# Patient Record
Sex: FEMALE | Race: WHITE | NOT HISPANIC OR LATINO | Employment: OTHER | URBAN - METROPOLITAN AREA
[De-identification: names, ages, dates, MRNs, and addresses within clinical notes are randomized per-mention and may not be internally consistent; named-entity substitution may affect disease eponyms.]

---

## 2017-05-30 ENCOUNTER — HOSPITAL ENCOUNTER (OUTPATIENT)
Dept: MAMMOGRAPHY | Facility: CLINIC | Age: 67
Discharge: HOME | End: 2017-05-30
Attending: FAMILY MEDICINE

## 2017-05-30 DIAGNOSIS — Z12.31 VISIT FOR SCREENING MAMMOGRAM: ICD-10-CM

## 2017-08-18 ENCOUNTER — COMMUNICATION - HEALTHEAST (OUTPATIENT)
Dept: FAMILY MEDICINE | Facility: CLINIC | Age: 67
End: 2017-08-18

## 2017-08-18 DIAGNOSIS — I10 ESSENTIAL HYPERTENSION, BENIGN: ICD-10-CM

## 2017-08-23 ENCOUNTER — OFFICE VISIT - HEALTHEAST (OUTPATIENT)
Dept: FAMILY MEDICINE | Facility: CLINIC | Age: 67
End: 2017-08-23

## 2017-08-23 ENCOUNTER — AMBULATORY - HEALTHEAST (OUTPATIENT)
Dept: NURSING | Facility: CLINIC | Age: 67
End: 2017-08-23

## 2017-08-23 DIAGNOSIS — J45.909 ASTHMA: ICD-10-CM

## 2017-08-23 DIAGNOSIS — E66.9 OBESITY: ICD-10-CM

## 2017-08-23 DIAGNOSIS — Z13.820 SCREENING FOR OSTEOPOROSIS: ICD-10-CM

## 2017-08-23 DIAGNOSIS — Z00.00 ANNUAL PHYSICAL EXAM: ICD-10-CM

## 2017-08-23 DIAGNOSIS — E78.5 HYPERLIPIDEMIA, UNSPECIFIED HYPERLIPIDEMIA TYPE: ICD-10-CM

## 2017-08-23 LAB
CHOLEST SERPL-MCNC: 187 MG/DL
FASTING STATUS PATIENT QL REPORTED: YES
HBA1C MFR BLD: 6.1 % (ref 3.5–6)
HDLC SERPL-MCNC: 51 MG/DL
LDLC SERPL CALC-MCNC: 103 MG/DL
TRIGL SERPL-MCNC: 164 MG/DL

## 2017-08-23 ASSESSMENT — MIFFLIN-ST. JEOR: SCORE: 1628.79

## 2017-08-25 ENCOUNTER — RECORDS - HEALTHEAST (OUTPATIENT)
Dept: BONE DENSITY | Facility: CLINIC | Age: 67
End: 2017-08-25

## 2017-08-25 ENCOUNTER — RECORDS - HEALTHEAST (OUTPATIENT)
Dept: ADMINISTRATIVE | Facility: OTHER | Age: 67
End: 2017-08-25

## 2017-08-25 DIAGNOSIS — Z13.820 ENCOUNTER FOR SCREENING FOR OSTEOPOROSIS: ICD-10-CM

## 2017-10-05 ENCOUNTER — COMMUNICATION - HEALTHEAST (OUTPATIENT)
Dept: FAMILY MEDICINE | Facility: CLINIC | Age: 67
End: 2017-10-05

## 2017-10-09 ENCOUNTER — COMMUNICATION - HEALTHEAST (OUTPATIENT)
Dept: FAMILY MEDICINE | Facility: CLINIC | Age: 67
End: 2017-10-09

## 2017-10-11 ENCOUNTER — OFFICE VISIT - HEALTHEAST (OUTPATIENT)
Dept: FAMILY MEDICINE | Facility: CLINIC | Age: 67
End: 2017-10-11

## 2017-10-11 DIAGNOSIS — I49.9 IRREGULAR HEART BEAT: ICD-10-CM

## 2017-10-11 DIAGNOSIS — E66.9 OBESITY: ICD-10-CM

## 2017-10-11 LAB
ATRIAL RATE - MUSE: 76 BPM
DIASTOLIC BLOOD PRESSURE - MUSE: NORMAL MMHG
INTERPRETATION ECG - MUSE: NORMAL
P AXIS - MUSE: 28 DEGREES
PR INTERVAL - MUSE: 188 MS
QRS DURATION - MUSE: 76 MS
QT - MUSE: 408 MS
QTC - MUSE: 459 MS
R AXIS - MUSE: 7 DEGREES
SYSTOLIC BLOOD PRESSURE - MUSE: NORMAL MMHG
T AXIS - MUSE: 54 DEGREES
VENTRICULAR RATE- MUSE: 76 BPM

## 2017-10-11 ASSESSMENT — MIFFLIN-ST. JEOR: SCORE: 1633.32

## 2018-01-28 ENCOUNTER — COMMUNICATION - HEALTHEAST (OUTPATIENT)
Dept: FAMILY MEDICINE | Facility: CLINIC | Age: 68
End: 2018-01-28

## 2018-01-28 DIAGNOSIS — E78.5 HYPERLIPIDEMIA, UNSPECIFIED HYPERLIPIDEMIA TYPE: ICD-10-CM

## 2018-07-11 ENCOUNTER — COMMUNICATION - HEALTHEAST (OUTPATIENT)
Dept: FAMILY MEDICINE | Facility: CLINIC | Age: 68
End: 2018-07-11

## 2018-07-11 DIAGNOSIS — I10 ESSENTIAL HYPERTENSION, BENIGN: ICD-10-CM

## 2018-08-24 ENCOUNTER — OFFICE VISIT - HEALTHEAST (OUTPATIENT)
Dept: FAMILY MEDICINE | Facility: CLINIC | Age: 68
End: 2018-08-24

## 2018-08-24 DIAGNOSIS — E78.5 HYPERLIPIDEMIA, UNSPECIFIED HYPERLIPIDEMIA TYPE: ICD-10-CM

## 2018-08-24 DIAGNOSIS — I10 ESSENTIAL HYPERTENSION, BENIGN: ICD-10-CM

## 2018-08-24 DIAGNOSIS — Z23 NEED FOR VACCINATION: ICD-10-CM

## 2018-08-24 DIAGNOSIS — J45.909 ASTHMA: ICD-10-CM

## 2018-08-24 DIAGNOSIS — Z00.00 MEDICARE ANNUAL WELLNESS VISIT, SUBSEQUENT: ICD-10-CM

## 2018-08-24 LAB
ALBUMIN SERPL-MCNC: 4.2 G/DL (ref 3.5–5)
ALP SERPL-CCNC: 58 U/L (ref 45–120)
ALT SERPL W P-5'-P-CCNC: 34 U/L (ref 0–45)
ANION GAP SERPL CALCULATED.3IONS-SCNC: 9 MMOL/L (ref 5–18)
AST SERPL W P-5'-P-CCNC: 28 U/L (ref 0–40)
BILIRUB SERPL-MCNC: 0.7 MG/DL (ref 0–1)
BUN SERPL-MCNC: 14 MG/DL (ref 8–22)
CALCIUM SERPL-MCNC: 9.9 MG/DL (ref 8.5–10.5)
CHLORIDE BLD-SCNC: 104 MMOL/L (ref 98–107)
CHOLEST SERPL-MCNC: 177 MG/DL
CO2 SERPL-SCNC: 29 MMOL/L (ref 22–31)
CREAT SERPL-MCNC: 0.75 MG/DL (ref 0.6–1.1)
FASTING STATUS PATIENT QL REPORTED: YES
GFR SERPL CREATININE-BSD FRML MDRD: >60 ML/MIN/1.73M2
GLUCOSE BLD-MCNC: 111 MG/DL (ref 70–125)
HDLC SERPL-MCNC: 48 MG/DL
LDLC SERPL CALC-MCNC: 89 MG/DL
POTASSIUM BLD-SCNC: 4.3 MMOL/L (ref 3.5–5)
PROT SERPL-MCNC: 6.4 G/DL (ref 6–8)
SODIUM SERPL-SCNC: 142 MMOL/L (ref 136–145)
TRIGL SERPL-MCNC: 199 MG/DL

## 2018-08-24 ASSESSMENT — MIFFLIN-ST. JEOR: SCORE: 1673.58

## 2019-05-28 ENCOUNTER — OFFICE VISIT - HEALTHEAST (OUTPATIENT)
Dept: FAMILY MEDICINE | Facility: CLINIC | Age: 69
End: 2019-05-28

## 2019-05-28 DIAGNOSIS — R31.9 HEMATURIA, UNSPECIFIED TYPE: ICD-10-CM

## 2019-05-28 LAB
ALBUMIN UR-MCNC: NEGATIVE MG/DL
APPEARANCE UR: CLEAR
BILIRUB UR QL STRIP: NEGATIVE
COLOR UR AUTO: YELLOW
GLUCOSE UR STRIP-MCNC: NEGATIVE MG/DL
HGB UR QL STRIP: NEGATIVE
KETONES UR STRIP-MCNC: NEGATIVE MG/DL
LEUKOCYTE ESTERASE UR QL STRIP: NEGATIVE
NITRATE UR QL: NEGATIVE
PH UR STRIP: 6.5 [PH] (ref 5–8)
SP GR UR STRIP: 1.02 (ref 1–1.03)
UROBILINOGEN UR STRIP-ACNC: NORMAL

## 2019-05-28 ASSESSMENT — MIFFLIN-ST. JEOR: SCORE: 1666.77

## 2019-06-17 ENCOUNTER — RECORDS - HEALTHEAST (OUTPATIENT)
Dept: ADMINISTRATIVE | Facility: OTHER | Age: 69
End: 2019-06-17

## 2019-06-25 ENCOUNTER — HOSPITAL ENCOUNTER (OUTPATIENT)
Dept: CT IMAGING | Facility: CLINIC | Age: 69
Discharge: HOME | End: 2019-06-25
Attending: UROLOGY

## 2019-06-25 DIAGNOSIS — R31.0 GROSS HEMATURIA: ICD-10-CM

## 2019-06-25 LAB
CREAT BLD-MCNC: 0.9 MG/DL (ref 0.6–1.1)
GFR SERPL CREATININE-BSD FRML MDRD: >60 ML/MIN/1.73M2

## 2019-07-17 ENCOUNTER — HOSPITAL ENCOUNTER (OUTPATIENT)
Dept: MAMMOGRAPHY | Facility: CLINIC | Age: 69
Discharge: HOME | End: 2019-07-17
Attending: FAMILY MEDICINE

## 2019-07-17 DIAGNOSIS — Z12.31 SCREENING MAMMOGRAM, ENCOUNTER FOR: ICD-10-CM

## 2019-08-19 ENCOUNTER — COMMUNICATION - HEALTHEAST (OUTPATIENT)
Dept: FAMILY MEDICINE | Facility: CLINIC | Age: 69
End: 2019-08-19

## 2019-08-19 DIAGNOSIS — E78.5 HYPERLIPIDEMIA, UNSPECIFIED HYPERLIPIDEMIA TYPE: ICD-10-CM

## 2019-08-27 ENCOUNTER — OFFICE VISIT - HEALTHEAST (OUTPATIENT)
Dept: FAMILY MEDICINE | Facility: CLINIC | Age: 69
End: 2019-08-27

## 2019-08-27 DIAGNOSIS — J45.909 ASTHMA: ICD-10-CM

## 2019-08-27 DIAGNOSIS — R73.01 IMPAIRED FASTING GLUCOSE: ICD-10-CM

## 2019-08-27 DIAGNOSIS — J45.30 MILD PERSISTENT ASTHMA WITHOUT COMPLICATION: ICD-10-CM

## 2019-08-27 DIAGNOSIS — I10 ESSENTIAL HYPERTENSION, BENIGN: ICD-10-CM

## 2019-08-27 DIAGNOSIS — E78.2 MIXED HYPERLIPIDEMIA: ICD-10-CM

## 2019-08-27 DIAGNOSIS — Z00.00 ROUTINE GENERAL MEDICAL EXAMINATION AT A HEALTH CARE FACILITY: ICD-10-CM

## 2019-08-27 DIAGNOSIS — Z11.59 NEED FOR HEPATITIS C SCREENING TEST: ICD-10-CM

## 2019-08-27 LAB
ALBUMIN SERPL-MCNC: 4.1 G/DL (ref 3.5–5)
ALP SERPL-CCNC: 57 U/L (ref 45–120)
ALT SERPL W P-5'-P-CCNC: 33 U/L (ref 0–45)
ANION GAP SERPL CALCULATED.3IONS-SCNC: 10 MMOL/L (ref 5–18)
AST SERPL W P-5'-P-CCNC: 30 U/L (ref 0–40)
BILIRUB SERPL-MCNC: 0.7 MG/DL (ref 0–1)
BUN SERPL-MCNC: 16 MG/DL (ref 8–22)
CALCIUM SERPL-MCNC: 10 MG/DL (ref 8.5–10.5)
CHLORIDE BLD-SCNC: 103 MMOL/L (ref 98–107)
CHOLEST SERPL-MCNC: 180 MG/DL
CO2 SERPL-SCNC: 29 MMOL/L (ref 22–31)
CREAT SERPL-MCNC: 0.81 MG/DL (ref 0.6–1.1)
FASTING STATUS PATIENT QL REPORTED: YES
GFR SERPL CREATININE-BSD FRML MDRD: >60 ML/MIN/1.73M2
GLUCOSE BLD-MCNC: 129 MG/DL (ref 70–125)
HDLC SERPL-MCNC: 53 MG/DL
LDLC SERPL CALC-MCNC: 95 MG/DL
POTASSIUM BLD-SCNC: 4.7 MMOL/L (ref 3.5–5)
PROT SERPL-MCNC: 6.4 G/DL (ref 6–8)
SODIUM SERPL-SCNC: 142 MMOL/L (ref 136–145)
TRIGL SERPL-MCNC: 161 MG/DL

## 2019-08-27 ASSESSMENT — MIFFLIN-ST. JEOR: SCORE: 1699.38

## 2019-08-28 LAB — HCV AB SERPL QL IA: NEGATIVE

## 2019-09-02 ENCOUNTER — COMMUNICATION - HEALTHEAST (OUTPATIENT)
Dept: FAMILY MEDICINE | Facility: CLINIC | Age: 69
End: 2019-09-02

## 2019-09-02 DIAGNOSIS — I10 ESSENTIAL HYPERTENSION, BENIGN: ICD-10-CM

## 2020-02-24 ENCOUNTER — OFFICE VISIT - HEALTHEAST (OUTPATIENT)
Dept: FAMILY MEDICINE | Facility: CLINIC | Age: 70
End: 2020-02-24

## 2020-02-24 DIAGNOSIS — E78.2 MIXED HYPERLIPIDEMIA: ICD-10-CM

## 2020-02-24 DIAGNOSIS — J45.31 MILD PERSISTENT ASTHMA WITH ACUTE EXACERBATION: ICD-10-CM

## 2020-02-24 DIAGNOSIS — I10 ESSENTIAL HYPERTENSION, BENIGN: ICD-10-CM

## 2020-02-24 DIAGNOSIS — R05.9 COUGH: ICD-10-CM

## 2020-02-24 ASSESSMENT — MIFFLIN-ST. JEOR: SCORE: 1669.61

## 2020-02-25 ENCOUNTER — COMMUNICATION - HEALTHEAST (OUTPATIENT)
Dept: FAMILY MEDICINE | Facility: CLINIC | Age: 70
End: 2020-02-25

## 2020-03-08 ENCOUNTER — COMMUNICATION - HEALTHEAST (OUTPATIENT)
Dept: FAMILY MEDICINE | Facility: CLINIC | Age: 70
End: 2020-03-08

## 2020-03-08 DIAGNOSIS — J45.31 MILD PERSISTENT ASTHMA WITH ACUTE EXACERBATION: ICD-10-CM

## 2020-05-05 ENCOUNTER — COMMUNICATION - HEALTHEAST (OUTPATIENT)
Dept: FAMILY MEDICINE | Facility: CLINIC | Age: 70
End: 2020-05-05

## 2020-05-05 DIAGNOSIS — E78.5 HYPERLIPIDEMIA, UNSPECIFIED HYPERLIPIDEMIA TYPE: ICD-10-CM

## 2020-05-05 DIAGNOSIS — I10 ESSENTIAL HYPERTENSION, BENIGN: ICD-10-CM

## 2020-05-11 ENCOUNTER — COMMUNICATION - HEALTHEAST (OUTPATIENT)
Dept: FAMILY MEDICINE | Facility: CLINIC | Age: 70
End: 2020-05-11

## 2020-05-11 DIAGNOSIS — I10 ESSENTIAL HYPERTENSION, BENIGN: ICD-10-CM

## 2020-05-11 DIAGNOSIS — E78.5 HYPERLIPIDEMIA, UNSPECIFIED HYPERLIPIDEMIA TYPE: ICD-10-CM

## 2020-07-28 ENCOUNTER — HOSPITAL ENCOUNTER (OUTPATIENT)
Dept: MAMMOGRAPHY | Facility: CLINIC | Age: 70
Discharge: HOME | End: 2020-07-28
Attending: FAMILY MEDICINE

## 2020-07-28 DIAGNOSIS — Z12.31 VISIT FOR SCREENING MAMMOGRAM: ICD-10-CM

## 2020-09-28 ENCOUNTER — OFFICE VISIT - HEALTHEAST (OUTPATIENT)
Dept: FAMILY MEDICINE | Facility: CLINIC | Age: 70
End: 2020-09-28

## 2020-09-28 DIAGNOSIS — J45.31 MILD PERSISTENT ASTHMA WITH ACUTE EXACERBATION: ICD-10-CM

## 2020-09-28 DIAGNOSIS — J30.9 ALLERGIC RHINITIS, UNSPECIFIED SEASONALITY, UNSPECIFIED TRIGGER: ICD-10-CM

## 2020-09-28 DIAGNOSIS — Z00.00 MEDICARE ANNUAL WELLNESS VISIT, SUBSEQUENT: ICD-10-CM

## 2020-09-28 DIAGNOSIS — I10 ESSENTIAL HYPERTENSION, BENIGN: ICD-10-CM

## 2020-09-28 DIAGNOSIS — R23.2 FLUSHING: ICD-10-CM

## 2020-09-28 DIAGNOSIS — E78.2 MIXED HYPERLIPIDEMIA: ICD-10-CM

## 2020-09-28 DIAGNOSIS — R73.01 IMPAIRED FASTING GLUCOSE: ICD-10-CM

## 2020-09-28 LAB
ALBUMIN SERPL-MCNC: 4.2 G/DL (ref 3.5–5)
ALP SERPL-CCNC: 61 U/L (ref 45–120)
ALT SERPL W P-5'-P-CCNC: 31 U/L (ref 0–45)
ANION GAP SERPL CALCULATED.3IONS-SCNC: 8 MMOL/L (ref 5–18)
AST SERPL W P-5'-P-CCNC: 28 U/L (ref 0–40)
BILIRUB SERPL-MCNC: 0.7 MG/DL (ref 0–1)
BUN SERPL-MCNC: 11 MG/DL (ref 8–22)
CALCIUM SERPL-MCNC: 10 MG/DL (ref 8.5–10.5)
CHLORIDE BLD-SCNC: 103 MMOL/L (ref 98–107)
CHOLEST SERPL-MCNC: 186 MG/DL
CO2 SERPL-SCNC: 31 MMOL/L (ref 22–31)
CREAT SERPL-MCNC: 0.85 MG/DL (ref 0.6–1.1)
ERYTHROCYTE [DISTWIDTH] IN BLOOD BY AUTOMATED COUNT: 11.7 % (ref 11–14.5)
FASTING STATUS PATIENT QL REPORTED: YES
GFR SERPL CREATININE-BSD FRML MDRD: >60 ML/MIN/1.73M2
GLUCOSE BLD-MCNC: 115 MG/DL (ref 70–125)
HBA1C MFR BLD: 5.7 %
HCT VFR BLD AUTO: 42.7 % (ref 35–47)
HDLC SERPL-MCNC: 56 MG/DL
HGB BLD-MCNC: 14.4 G/DL (ref 12–16)
LDLC SERPL CALC-MCNC: 96 MG/DL
MCH RBC QN AUTO: 30.2 PG (ref 27–34)
MCHC RBC AUTO-ENTMCNC: 33.8 G/DL (ref 32–36)
MCV RBC AUTO: 89 FL (ref 80–100)
PLATELET # BLD AUTO: 239 THOU/UL (ref 140–440)
PMV BLD AUTO: 7.2 FL (ref 7–10)
POTASSIUM BLD-SCNC: 4.5 MMOL/L (ref 3.5–5)
PROT SERPL-MCNC: 6.6 G/DL (ref 6–8)
RBC # BLD AUTO: 4.79 MILL/UL (ref 3.8–5.4)
SODIUM SERPL-SCNC: 142 MMOL/L (ref 136–145)
TRIGL SERPL-MCNC: 168 MG/DL
TSH SERPL DL<=0.005 MIU/L-ACNC: 1.01 UIU/ML (ref 0.3–5)
WBC: 4.9 THOU/UL (ref 4–11)

## 2020-09-28 ASSESSMENT — ANXIETY QUESTIONNAIRES
2. NOT BEING ABLE TO STOP OR CONTROL WORRYING: NOT AT ALL
1. FEELING NERVOUS, ANXIOUS, OR ON EDGE: NOT AT ALL

## 2020-09-28 ASSESSMENT — MIFFLIN-ST. JEOR: SCORE: 1683.5

## 2020-10-02 ENCOUNTER — COMMUNICATION - HEALTHEAST (OUTPATIENT)
Dept: FAMILY MEDICINE | Facility: CLINIC | Age: 70
End: 2020-10-02

## 2020-10-02 DIAGNOSIS — J45.909 ASTHMA: ICD-10-CM

## 2020-10-06 RX ORDER — ALBUTEROL SULFATE 90 UG/1
2 AEROSOL, METERED RESPIRATORY (INHALATION) EVERY 6 HOURS PRN
Qty: 18 G | Refills: 5 | Status: SHIPPED | OUTPATIENT
Start: 2020-10-06 | End: 2021-09-19

## 2021-02-03 ENCOUNTER — COMMUNICATION - HEALTHEAST (OUTPATIENT)
Dept: FAMILY MEDICINE | Facility: CLINIC | Age: 71
End: 2021-02-03

## 2021-02-03 DIAGNOSIS — E78.5 HYPERLIPIDEMIA, UNSPECIFIED HYPERLIPIDEMIA TYPE: ICD-10-CM

## 2021-02-03 DIAGNOSIS — I10 ESSENTIAL HYPERTENSION, BENIGN: ICD-10-CM

## 2021-02-03 RX ORDER — SIMVASTATIN 40 MG
TABLET ORAL
Qty: 90 TABLET | Refills: 2 | Status: SHIPPED | OUTPATIENT
Start: 2021-02-03 | End: 2021-11-02

## 2021-02-03 RX ORDER — LISINOPRIL AND HYDROCHLOROTHIAZIDE 20; 25 MG/1; MG/1
TABLET ORAL
Qty: 90 TABLET | Refills: 2 | Status: SHIPPED | OUTPATIENT
Start: 2021-02-03 | End: 2021-11-02

## 2021-02-09 ENCOUNTER — COMMUNICATION - HEALTHEAST (OUTPATIENT)
Dept: FAMILY MEDICINE | Facility: CLINIC | Age: 71
End: 2021-02-09

## 2021-05-25 ENCOUNTER — RECORDS - HEALTHEAST (OUTPATIENT)
Dept: ADMINISTRATIVE | Facility: CLINIC | Age: 71
End: 2021-05-25

## 2021-05-27 ENCOUNTER — RECORDS - HEALTHEAST (OUTPATIENT)
Dept: ADMINISTRATIVE | Facility: CLINIC | Age: 71
End: 2021-05-27

## 2021-05-28 ENCOUNTER — RECORDS - HEALTHEAST (OUTPATIENT)
Dept: ADMINISTRATIVE | Facility: CLINIC | Age: 71
End: 2021-05-28

## 2021-05-28 ASSESSMENT — ASTHMA QUESTIONNAIRES: ACT_TOTALSCORE: 25

## 2021-05-29 ENCOUNTER — RECORDS - HEALTHEAST (OUTPATIENT)
Dept: ADMINISTRATIVE | Facility: CLINIC | Age: 71
End: 2021-05-29

## 2021-05-29 NOTE — PROGRESS NOTES
1. Hematuria, unspecified type  Urinalysis Macroscopic    Ambulatory referral to Urology     Recent Results (from the past 24 hour(s))   Urinalysis Macroscopic    Collection Time: 05/28/19  2:18 PM   Result Value Ref Range    Color, UA Yellow Colorless, Yellow, Straw, Light Yellow    Clarity, UA Clear Clear    Glucose, UA Negative Negative    Bilirubin, UA Negative Negative    Ketones, UA Negative Negative    Specific Gravity, UA 1.020 1.005 - 1.030    Blood, UA Negative Negative    pH, UA 6.5 5.0 - 8.0    Protein, UA Negative Negative mg/dL    Urobilinogen, UA 0.2 E.U./dL 0.2 E.U./dL, 1.0 E.U./dL    Nitrite, UA Negative Negative    Leukocytes, UA Negative Negative         ASSESSMENT/PLAN:     The following high BMI interventions were performed this visit: encouragement to exercise and weight monitoring    1. Hematuria, unspecified type    - Urinalysis Macroscopic  - Ambulatory referral to Urology      There are no Patient Instructions on file for this visit.  There are no discontinued medications.  Return if symptoms worsen or fail to improve.          Stacey Lozano NP          SUBJECTIVE:  Imelda El is a 68 y.o. female resents for evaluation of her hematuria.  Onset: 11 days.  Patient denies any increased urinary frequency urgency, no burning or discomfort today.  She has noted that there has been blood in her urine intermittently on the days when she is more active such as when she takes walks or goes hiking.  She was on a vacation recently in [facility] and they did do quite a bit of walking every day, she noticed on the days that they were more active, she did have a moderate amount of bleeding, on the days when she was not walking long distances, the eating would subside.  She denies fever, chills, nausea, vomiting, diarrhea or low back pain today.  She typically takes showers not baths, she wipes front to back, she is sexually active.  No history of kidney stone formation, she is up-to-date  with colon cancer screening.  She is postmenopausal and uses KY jelly daily for vaginal dryness, most specifically on the external genitalia.  She denies any abnormal vaginal bleeding, she did have a hysterectomy, cervix and uterus have both been removed, she does have her ovaries in place.  She denies pain anywhere today. Discussed options such as sending her to urology for further evaluation.  If urology specialty is unable to find any cause for hematuria, will have her return to the clinic and vaginal Pap will be completed.  She does donate blood on a regular basis, she states her hemoglobin levels have been within normal range last several months.  She is due to donate again tomorrow and will have her hemoglobin and hematocrit levels checked then.  Chief Complaint   Patient presents with     Hematuria     x 1 week - noticied that she has blood in the urine after she does alot of physical activity         Patient Active Problem List   Diagnosis     Menopause Has Occurred     Hyperlipidemia     Benign Essential Hypertension     Allergic Rhinitis     Asthma     Obesity     Impaired Fasting Glucose     Tubular adenoma of colon       Current Outpatient Medications   Medication Sig Dispense Refill     albuterol (PROAIR HFA;PROVENTIL HFA;VENTOLIN HFA) 90 mcg/actuation inhaler Inhale 2 puffs every 6 (six) hours as needed for wheezing. 18 g 5     b complex vitamins tablet Take 1 tablet by mouth daily.       calcium-vitamin D3-vitamin K (VIACTIV) 500-500-40 mg-unit-mcg Chew per chewable tablet Chew 2 tablets 2 (two) times a day.       fexofenadine (ALLEGRA) 180 MG tablet Take 180 mg by mouth daily.       lisinopril-hydrochlorothiazide (PRINZIDE,ZESTORETIC) 20-25 mg per tablet Take 1 tablet by mouth daily. 90 tablet 3     magnesium oxide 500 mg Tab Take by mouth.       mometasone (ASMANEX TWISTHALER) 220 mcg (60 doses) inhaler INHALE 1 PUFF TWICE DAILY. 1 each 11     simvastatin (ZOCOR) 40 MG tablet Take 1 tablet (40mg) by  oral route once daily at bedtime 90 tablet 3     VIT C/E/ZN/COPPR/LUTEIN/ZEAXAN (PRESERVISION AREDS 2 ORAL) Take 2 tablets by mouth.       No current facility-administered medications for this visit.        Social History     Tobacco Use   Smoking Status Never Smoker   Smokeless Tobacco Never Used       REVIEW OF SYSTEMS: Denies abdominal pain, changes in bowel habits, diarrhea, constipation, nausea, vomiting, rectal bleeding, melena, new/unusual/ uncooked foods, no sick contacts or recent travel.   Denies dysuria, frequency, urgency, fevers, chills, back pain, nausea, vomiting or abdominal pain.        TOBACCO USE:  Social History     Tobacco Use   Smoking Status Never Smoker   Smokeless Tobacco Never Used     Social History     Socioeconomic History     Marital status:      Spouse name: Rob     Number of children: 2     Years of education: 16     Highest education level: Not on file   Occupational History     Occupation: retired   Social Needs     Financial resource strain: Not on file     Food insecurity:     Worry: Not on file     Inability: Not on file     Transportation needs:     Medical: Not on file     Non-medical: Not on file   Tobacco Use     Smoking status: Never Smoker     Smokeless tobacco: Never Used   Substance and Sexual Activity     Alcohol use: Yes     Comment: 0-1     Drug use: No     Sexual activity: Yes     Partners: Male   Lifestyle     Physical activity:     Days per week: Not on file     Minutes per session: Not on file     Stress: Not on file   Relationships     Social connections:     Talks on phone: Not on file     Gets together: Not on file     Attends Taoism service: Not on file     Active member of club or organization: Not on file     Attends meetings of clubs or organizations: Not on file     Relationship status: Not on file     Intimate partner violence:     Fear of current or ex partner: Not on file     Emotionally abused: Not on file     Physically abused: Not on file      Forced sexual activity: Not on file   Other Topics Concern     Not on file   Social History Narrative     Not on file         OBJECTIVE:            Vitals:    05/28/19 1414   BP: 142/60   Pulse: (!) 107   Resp: 16   Temp: 97.4  F (36.3  C)   SpO2: 97%     Weight: (!) 250 lb (113.4 kg)    Wt Readings from Last 3 Encounters:   05/28/19 (!) 250 lb (113.4 kg)   08/24/18 (!) 248 lb (112.5 kg)   10/11/17 (!) 240 lb (108.9 kg)     Body mass index is 40.66 kg/m .        Physical Exam:  GENERAL APPEARANCE: A&A, NAD, well hydrated, well nourished  CV: RRR, no M/G/R   LUNGS: CTAB, normal respiratory effort  ABDOMEN: S&NT, no masses, no organomegaly, BS present x4, no CVA tenderness  SKIN:  Normal skin turgor, no lesions/rashes, warm and dry

## 2021-05-30 ENCOUNTER — RECORDS - HEALTHEAST (OUTPATIENT)
Dept: ADMINISTRATIVE | Facility: CLINIC | Age: 71
End: 2021-05-30

## 2021-05-31 ENCOUNTER — RECORDS - HEALTHEAST (OUTPATIENT)
Dept: ADMINISTRATIVE | Facility: CLINIC | Age: 71
End: 2021-05-31

## 2021-05-31 VITALS — BODY MASS INDEX: 37.51 KG/M2 | HEIGHT: 67 IN | WEIGHT: 239 LBS

## 2021-05-31 VITALS — HEIGHT: 67 IN | WEIGHT: 240 LBS | BODY MASS INDEX: 37.67 KG/M2

## 2021-05-31 NOTE — TELEPHONE ENCOUNTER
Refill Approved    Rx renewed per Medication Renewal Policy. Medication was last renewed on 8/24/18, last OV 8/27/19.    Nga Perez, [facility] Triage/Med Refill 9/2/2019     Requested Prescriptions   Pending Prescriptions Disp Refills     lisinopril-hydrochlorothiazide (PRINZIDE,ZESTORETIC) 20-25 mg per tablet 90 tablet 3     Sig: Take 1 tablet by mouth daily.       Diuretics/Combination Diuretics Refill Protocol  Passed - 9/2/2019  9:34 AM        Passed - Visit with PCP or prescribing provider visit in past 12 months     Last office visit with prescriber/PCP: Visit date not found OR same dept: 5/28/2019 Stacey Lozano NP OR same specialty: 5/28/2019 Stacey Lozano NP  Last physical: 8/27/2019 Last MTM visit: Visit date not found   Next visit within 3 mo: Visit date not found  Next physical within 3 mo: Visit date not found  Prescriber OR PCP: Michell Anguiano MD  Last diagnosis associated with med order: 1. Benign Essential Hypertension  - lisinopril-hydrochlorothiazide (PRINZIDE,ZESTORETIC) 20-25 mg per tablet; Take 1 tablet by mouth daily.  Dispense: 90 tablet; Refill: 3    If protocol passes may refill for 12 months if within 3 months of last provider visit (or a total of 15 months).             Passed - Serum Potassium in past 12 months      Lab Results   Component Value Date    Potassium 4.7 08/27/2019             Passed - Serum Sodium in past 12 months      Lab Results   Component Value Date    Sodium 142 08/27/2019             Passed - Blood pressure on file in past 12 months     BP Readings from Last 1 Encounters:   08/27/19 132/74             Passed - Serum Creatinine in past 12 months      Creatinine   Date Value Ref Range Status   08/27/2019 0.81 0.60 - 1.10 mg/dL Final

## 2021-05-31 NOTE — TELEPHONE ENCOUNTER
Refill Approved    Rx renewed per Medication Renewal Policy. Medication was last renewed on 8/24/18    [place], [facility] Connection Triage/Med Refill 8/19/2019     Requested Prescriptions   Pending Prescriptions Disp Refills     simvastatin (ZOCOR) 40 MG tablet [Pharmacy Med Name: Simvastatin Oral Tablet 40 MG] 90 tablet 2     Sig: TAKE ONE TABLET BY MOUTH AT BEDTIME       Statins Refill Protocol (Hmg CoA Reductase Inhibitors) Passed - 8/19/2019  9:09 AM        Passed - PCP or prescribing provider visit in past 12 months      Last office visit with prescriber/PCP: Visit date not found OR same dept: 5/28/2019 Stacey Lozano NP OR same specialty: 5/28/2019 Stacey Lozano NP  Last physical: 8/24/2018 Last MTM visit: Visit date not found   Next visit within 3 mo: Visit date not found  Next physical within 3 mo: Visit date not found  Prescriber OR PCP: Michell Anguiano MD  Last diagnosis associated with med order: 1. Hyperlipidemia, unspecified hyperlipidemia type  - simvastatin (ZOCOR) 40 MG tablet [Pharmacy Med Name: Simvastatin Oral Tablet 40 MG]; TAKE ONE TABLET BY MOUTH AT BEDTIME   Dispense: 90 tablet; Refill: 2    If protocol passes may refill for 12 months if within 3 months of last provider visit (or a total of 15 months).

## 2021-05-31 NOTE — PROGRESS NOTES
Assessment and Plan:     1. Routine general medical examination at a health care facility    2. Benign Essential Hypertension      She will continue the lisinopril/hctz and labs were drawn. We reviewed dietary recommendations, including low salt and high fiber diet, and  recommendations for regular exercise/activity.    - Comprehensive Metabolic Panel    3. Mixed hyperlipidemia     She will continue the simvastatin as directed. We discussed limiting saturated and trans fats in her diet and using more of the good fats such as olive oil, canola oil, flax seed and peanut oil, etc.   - Lipid [place] FASTING  - Comprehensive Metabolic Panel    4. Mild persistent asthma without complication      Medications were renewed as noted. We reviewed indications for re-evaluation.  - albuterol (PROAIR HFA;PROVENTIL HFA;VENTOLIN HFA) 90 mcg/actuation inhaler; Inhale 2 puffs every 6 (six) hours as needed for wheezing.  Dispense: 18 g; Refill: 5  - mometasone (ASMANEX TWISTHALER) 220 mcg (60 doses) inhaler; INHALE 1 PUFF TWICE DAILY.  Dispense: 1 each; Refill: 11    5. Need for hepatitis C screening test  - Hepatitis C Antibody (Anti-HCV)    6. Impaired fasting glucose  - Glucose; Future  - Glycosylated Hemoglobin A1c; Future        The patient's current medical problems were reviewed.    I have had an Advance Directives discussion with the patient.  The following high BMI interventions were performed this visit: encouragement to exercise and lifestyle education regarding diet  The following health maintenance schedule was reviewed with the patient and provided in printed form in the after visit summary:   Health Maintenance   Topic Date Due     HEPATITIS C SCREENING  1950     ZOSTER VACCINES (2 of 3) 08/14/2012     INFLUENZA VACCINE RULE BASED (1) 08/01/2019     ASTHMA CONTROL TEST  08/24/2019     FALL RISK ASSESSMENT  08/24/2019     DXA SCAN  08/25/2019     MEDICARE ANNUAL WELLNESS VISIT  08/24/2019     MAMMOGRAM   07/17/2021     COLONOSCOPY  07/22/2021     ADVANCE CARE PLANNING  08/24/2023     TD 18+ HE  08/24/2028     PNEUMOCOCCAL POLYSACCHARIDE VACCINE AGE 65 AND OVER  Completed     PNEUMOCOCCAL CONJUGATE VACCINE FOR ADULTS (PCV13 OR PREVNAR)  Completed        Subjective:   Chief Complaint: Imelda El is an 68 y.o. female here for an Annual Wellness visit.   HPI:   Fasting today? Yes  Hyperlipidemia & Hypertension      Patient is also here for follow-up of hypertension and dyslipidemia. A repeat fasting lipid profile was done. Compliance with treatment has been good. Patient denies muscle pain associated with her medications. Current symptoms: none. Patient denies chest pain, dyspnea, exertional chest pressure/discomfort, lower extremity edema, near-syncope and palpitations. The patient exercises intermittently. Weight trend: fluctuating a bit.      Previous history of cardiac disease includes: none.      Review of Systems:   Asthma symptoms stable - used albuterol once last week  Takes allegra daily as well  Balanced diet. Weight up a bit.   Working on living will  Retired for a couple years.    Please see above.  The rest of the review of systems are negative for all systems.    Patient Care Team:  Michlel Anguiano MD as PCP - General     Patient Active Problem List   Diagnosis     Menopause Has Occurred     Hyperlipidemia     Benign Essential Hypertension     Allergic Rhinitis     Asthma     Obesity     Impaired Fasting Glucose     Tubular adenoma of colon     Past Medical History:   Diagnosis Date     Allergic rhinitis      Asthma      Hyperlipemia      Hypertension      Irregular heart beat      Obese       Past Surgical History:   Procedure Laterality Date     HYSTERECTOMY       OOPHORECTOMY Left      IA DILATION/CURETTAGE,DIAGNOSTIC      Description: Dilation And Curettage;  Recorded: 04/15/2008;     IA HYSTEROSCOPY,W/ENDOMETRIAL ABLATION      Description: Hysteroscopy With Endometrial Ablation;   Recorded: 04/15/2008;     SC LIGATE FALLOPIAN TUBE      Description: Tubal Ligation;  Recorded: 04/15/2008;     SC TOTAL ABDOM HYSTERECTOMY      Description: Total Abdominal Hysterectomy;  Recorded: 04/17/2009;  Comments: LSO      Family History   Problem Relation Age of Onset     Breast cancer Mother 81     No Medical Problems Sister      No Medical Problems Brother      Diabetes Maternal Grandmother      No Medical Problems Sister      No Medical Problems Sister      Diabetes Niece       Social History     Socioeconomic History     Marital status:      Spouse name: Rob     Number of children: 2     Years of education: 16     Highest education level: Not on file   Occupational History     Occupation: retired   Social Needs     Financial resource strain: Not on file     Food insecurity:     Worry: Not on file     Inability: Not on file     Transportation needs:     Medical: Not on file     Non-medical: Not on file   Tobacco Use     Smoking status: Never Smoker     Smokeless tobacco: Never Used   Substance and Sexual Activity     Alcohol use: Yes     Comment: 0-1     Drug use: No     Sexual activity: Yes     Partners: Male   Lifestyle     Physical activity:     Days per week: Not on file     Minutes per session: Not on file     Stress: Not on file   Relationships     Social connections:     Talks on phone: Not on file     Gets together: Not on file     Attends Adventism service: Not on file     Active member of club or organization: Not on file     Attends meetings of clubs or organizations: Not on file     Relationship status: Not on file     Intimate partner violence:     Fear of current or ex partner: Not on file     Emotionally abused: Not on file     Physically abused: Not on file     Forced sexual activity: Not on file   Other Topics Concern     Not on file   Social History Narrative     Not on file      Current Outpatient Medications   Medication Sig Dispense Refill     b complex vitamins tablet Take 1  "tablet by mouth daily.       calcium-vitamin D3-vitamin K (VIACTIV) 500-500-40 mg-unit-mcg Chew per chewable tablet Chew 2 tablets 2 (two) times a day.       fexofenadine (ALLEGRA) 180 MG tablet Take 180 mg by mouth daily.       lisinopril-hydrochlorothiazide (PRINZIDE,ZESTORETIC) 20-25 mg per tablet Take 1 tablet by mouth daily. 90 tablet 3     magnesium oxide 500 mg Tab Take by mouth.       simvastatin (ZOCOR) 40 MG tablet TAKE ONE TABLET BY MOUTH AT BEDTIME 90 tablet 2     VIT C/E/ZN/COPPR/LUTEIN/ZEAXAN (PRESERVISION AREDS 2 ORAL) Take 2 tablets by mouth.       albuterol (PROAIR HFA;PROVENTIL HFA;VENTOLIN HFA) 90 mcg/actuation inhaler Inhale 2 puffs every 6 (six) hours as needed for wheezing. 18 g 5     mometasone (ASMANEX TWISTHALER) 220 mcg (60 doses) inhaler INHALE 1 PUFF TWICE DAILY. 1 each 11     No current facility-administered medications for this visit.       Objective:   Vital Signs:   Visit Vitals  /74 (Patient Position: Sitting, Cuff Size: Adult Large)   Pulse 81   Ht 5' 6.5\" (1.689 m)   Wt (!) 254 lb 9 oz (115.5 kg)   SpO2 96%   BMI 40.47 kg/m         VisionScreening:  No exam data present     PHYSICAL EXAM  General: alert, pleasant, and no distress  Head: Normocephalic, without obvious abnormality, atraumatic  Eyes: conjunctivae and sclerae normal and pupils equal, round, reactive to   light and accomodation  Ears: normal TM's and external ear canals both ears  Nose: no discharge, no sinus tenderness  Throat: lips, mucosa, and tongue normal; teeth and gums normal  Neck: no adenopathy, supple, symmetrical, trachea midline and thyroid normal  Back: symmetric, ROM normal. No CVA tenderness.  Lungs: clear to auscultation bilaterally  Breasts: normal appearance, no masses or tenderness  Heart : regular rate and rhythm and no murmurs  Abdomen:  bowel sounds normal, no masses palpable and soft, non-tender  Pelvic: exam deferred  Extremities: extremities normal, atraumatic, no cyanosis or " edema  Pulses: 2+ and symmetric  Skin: Skin color, texture, turgor normal. No rashes or lesions  Lymph nodes: Cervical, supraclavicular, and axillary nodes normal.  Neurologic: Grossly normal            Assessment Results 8/27/2019   Activities of Daily Living No help needed   Instrumental Activities of Daily Living No help needed   Mini Cog Total Score 5   Some recent data might be hidden     A Mini-Cog score of 0-2 suggests the possibility of dementia, score of 3-5 suggests no dementia    Identified Health Risks:     She is at risk for lack of exercise and has been provided with information to increase physical activity for the benefit of her well-being.  Information regarding advance directives (living tamayo), including where she can download the appropriate form, was provided to the patient via the AVS.     Results for orders placed or performed in visit on 08/27/19   Lipid [place] FASTING   Result Value Ref Range    Cholesterol 180 <=199 mg/dL    Triglycerides 161 (H) <=149 mg/dL    HDL Cholesterol 53 >=50 mg/dL    LDL Calculated 95 <=129 mg/dL    Patient Fasting > 8hrs? Yes    Comprehensive Metabolic Panel   Result Value Ref Range    Sodium 142 136 - 145 mmol/L    Potassium 4.7 3.5 - 5.0 mmol/L    Chloride 103 98 - 107 mmol/L    CO2 29 22 - 31 mmol/L    Anion Gap, Calculation 10 5 - 18 mmol/L    Glucose 129 (H) 70 - 125 mg/dL    BUN 16 8 - 22 mg/dL    Creatinine 0.81 0.60 - 1.10 mg/dL    GFR MDRD Af Amer >60 >60 mL/min/1.73m2    GFR MDRD Non Af Amer >60 >60 mL/min/1.73m2    Bilirubin, Total 0.7 0.0 - 1.0 mg/dL    Calcium 10.0 8.5 - 10.5 mg/dL    Protein, Total 6.4 6.0 - 8.0 g/dL    Albumin 4.1 3.5 - 5.0 g/dL    Alkaline Phosphatase 57 45 - 120 U/L    AST 30 0 - 40 U/L    ALT 33 0 - 45 U/L   Hepatitis C Antibody (Anti-HCV)   Result Value Ref Range    Hepatitis C Ab Negative Negative

## 2021-06-01 VITALS — HEIGHT: 67 IN | WEIGHT: 248 LBS | BODY MASS INDEX: 38.92 KG/M2

## 2021-06-03 VITALS — BODY MASS INDEX: 40.18 KG/M2 | WEIGHT: 250 LBS | HEIGHT: 66 IN

## 2021-06-03 VITALS — BODY MASS INDEX: 39.96 KG/M2 | WEIGHT: 254.56 LBS | HEIGHT: 67 IN

## 2021-06-04 VITALS
DIASTOLIC BLOOD PRESSURE: 64 MMHG | WEIGHT: 247.13 LBS | BODY MASS INDEX: 38.79 KG/M2 | OXYGEN SATURATION: 95 % | HEART RATE: 81 BPM | HEIGHT: 67 IN | SYSTOLIC BLOOD PRESSURE: 124 MMHG

## 2021-06-05 VITALS
SYSTOLIC BLOOD PRESSURE: 138 MMHG | HEIGHT: 67 IN | WEIGHT: 250.19 LBS | DIASTOLIC BLOOD PRESSURE: 72 MMHG | BODY MASS INDEX: 39.27 KG/M2 | OXYGEN SATURATION: 96 % | HEART RATE: 78 BPM

## 2021-06-06 NOTE — PROGRESS NOTES
" OV   2/24/2020  Assessment:       1. Mild persistent asthma with acute exacerbation  predniSONE (DELTASONE) 20 MG tablet   2. Cough     3. Benign Essential Hypertension     4. Mixed hyperlipidemia          Plan:        We will cover with oral prednisone as directed for acute asthma exacerbation. We reviewed use of OTC analgesics, decongestants, nasal steroids, and/or mucinex, as well as increased fluids, rest and humidity, etc. She will call or return to clinic with any ongoing or worsening symptoms. She will continue her same medications and  follow up in 4-6 mos for med check.        Subjective:             Imelda El presents for evaluation of cough and nasal congestion/allergies. Symptoms began a few weeks ago. Symptoms have persisted since that time. Cough is described as mostly non-productive and with wheezing. Symptoms are associated with clear rhinorrhea and headaches, decrease in energy level, and shortness of breath. She denies chills, fever and hemoptysis. She reports  activity and mold exposures aggravates the cough. She attributes her symptoms to the snow mold from recent melting.       Past history is significant for asthma, allergic rhinitis. She has been using asmanex regularly and albuterol prn.  She is busy with primary election coming up and a trip to [place] planned mid-March.        She has a history of hypertension and hyperlipidemia and denies any significant side effects or problems.       The following portions of the patient's history were reviewed and updated as appropriate: allergies, current medications, past family history, past medical history, past social history, past surgical history and problem list.    Review of Systems  12 point ROS negative except as noted above.           Objective:        Vitals:    02/24/20 1248   BP: 124/64   Patient Position: Sitting   Cuff Size: Adult Large   Pulse: 81   SpO2: 95%   Weight: (!) 247 lb 2 oz (112.1 kg)   Height: 5' 6.75\" (1.695 m)    "   General     Alert, cooperative, no distress   Head:    Normocephalic, without obvious abnormality, atraumatic   Eyes:    PERRL, conjunctiva/corneas clear, EOM's intact   Ears:    Normal TM's and external ear canals, both ears   Nose:   Nasal mucosa normal, no drainage, and mild bilateral maxillary and frontal sinus tenderness   Throat:   Oropharynx is clear with moist mucous membranes     Neck:   Supple, mild anterior cervical adenopathy and supple, symmetrical, trachea midline    Lungs:     clear to auscultation bilaterally   Heart :    Regular rate and rhythm, no murmur, rub or gallop   Abdomen:     Soft, non-tender, no masses   Skin:   Skin color, texture, turgor normal, no rashes or lesions
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
## 2021-06-12 NOTE — PROGRESS NOTES
Imelda El is a 66 y.o. female is here for a  Health Maintenance exam.  Overall she is doing very well.  Patient with a history of high cholesterol, hypertension, asthma, allergic rhinitis, postmenopausal and obesity.  She is newly retired and she is starting to introduce regular exercise back into her daily life.  She does not smoke, she socially drinks and she does not use any illicit drugs.  She lives at home with her  Jean Paul and she has 2 grown children.  She is requesting a refill of her inhalers for her asthma and her simvastatin for her cholesterol management.  She is up-to-date with her dental and eye exams.  She is due for a bone density scan, and is otherwise up-to-date with all of her other health maintenance.  She has had a total hysterectomy.  Her last bone scan in 2012 did show normal bone density.  Her asthma control test was updated today. she did score a 25 and has had no hospitalizations in the last 12 months related to her asthma.    Healthy Habits:   Regular Exercise: No, working on this  Sunscreen Use: Yes  Healthy Diet: Yes.  This typically includes Greek yogurt, toast and eggs.  Lunch is typically leftovers from the previous night's dinner.  Dinner typically includes a meat and vegetable.  He will sometimes snack on popcorn or have a bowl ice cream at night before bed.  Dental Visits Regularly: Yes  Seat Belt: Yes  Sexually active: Yes  Self Breast Exam Monthly:No, discussed  Hemoccults: No  Flex Sig: No  Colonoscopy: Yes  Lipid Profile: Yes  Glucose Screen: Yes  Prevention of Osteoporosis: Yes  Last Dexa: Yes  Guns at Home:  Yes  Guns Safety Locks:  No  Domestic Violence:  No    Current Outpatient Medications Include:    Current Outpatient Prescriptions:      albuterol (PROAIR HFA;PROVENTIL HFA;VENTOLIN HFA) 90 mcg/actuation inhaler, Inhale 2 puffs every 6 (six) hours as needed for wheezing., Disp: 18 g, Rfl: 5     b complex vitamins tablet, Take 1 tablet by mouth daily., Disp: ,  Rfl:      fexofenadine (ALLEGRA) 180 MG tablet, Take 180 mg by mouth daily., Disp: , Rfl:      lisinopril-hydrochlorothiazide (PRINZIDE,ZESTORETIC) 20-25 mg per tablet, TAKE ONE TABLET BY MOUTH ONE TIME DAILY , Disp: 30 tablet, Rfl: 10     magnesium oxide 500 mg Tab, Take by mouth., Disp: , Rfl:      mometasone (ASMANEX TWISTHALER) 220 mcg (60 doses) inhaler, INHALE 1 PUFF TWICE DAILY., Disp: 1 each, Rfl: 11     simvastatin (ZOCOR) 40 MG tablet, Take 1 tablet (40 mg total) by mouth at bedtime. Needs to be seen for further refills., Disp: 90 tablet, Rfl: 1     VIT C/E/ZN/COPPR/LUTEIN/ZEAXAN (PRESERVISION AREDS 2 ORAL), Take 2 tablets by mouth., Disp: , Rfl:      FLAXSEED OIL ORAL, Take by mouth., Disp: , Rfl:     Allergies:    Allergies   Allergen Reactions     Amoxicillin        Past Medical History:   Diagnosis Date     Allergic rhinitis      Asthma      Hyperlipemia      Hypertension      Obese        Past Surgical History:   Procedure Laterality Date     HYSTERECTOMY       OOPHORECTOMY Left      NM DILATION/CURETTAGE,DIAGNOSTIC      Description: Dilation And Curettage;  Recorded: 04/15/2008;     NM HYSTEROSCOPY,W/ENDOMETRIAL ABLATION      Description: Hysteroscopy With Endometrial Ablation;  Recorded: 04/15/2008;     NM LIGATE FALLOPIAN TUBE      Description: Tubal Ligation;  Recorded: 04/15/2008;     NM TOTAL ABDOM HYSTERECTOMY      Description: Total Abdominal Hysterectomy;  Recorded: 2009;  Comments: LSO       OB History    Para Term  AB Living   2 2 2   2   SAB TAB Ectopic Multiple Live Births             # Outcome Date GA Lbr Carlos/2nd Weight Sex Delivery Anes PTL Lv   2 Term            1 Term                   Immunization History   Administered Date(s) Administered     DT (pediatric) 1999     Influenza, inj, historic 10/17/2009, 2015     Pneumo Conj 13-V (2010&after) 2016     Pneumo Polysac 23-V 2015     Td, historic 04/15/2008     Tdap 04/15/2008     ZOSTER  06/19/2012       Family History   Problem Relation Age of Onset     Breast cancer Mother 81     No Medical Problems Sister      No Medical Problems Brother      Diabetes Maternal Grandmother      No Medical Problems Sister      No Medical Problems Sister        Social History     Social History     Marital status:      Spouse name: Rob     Number of children: 2     Years of education: 16     Occupational History     retired      Social History Main Topics     Smoking status: Never Smoker     Smokeless tobacco: Never Used     Alcohol use Yes      Comment: 0-1     Drug use: No     Sexual activity: Yes     Partners: Male     Other Topics Concern     Not on file     Social History Narrative       Last cholesterol:   Lab Results   Component Value Date    CHOL 187 08/23/2017    CHOL 177 08/19/2016    CHOL 175 02/15/2016     Lab Results   Component Value Date    HDL 51 08/23/2017    HDL 50 08/19/2016    HDL 56 02/15/2016     Lab Results   Component Value Date    LDLCALC 103 08/23/2017    LDLCALC 96 08/19/2016    LDLCALC 93 02/15/2016     Lab Results   Component Value Date    TRIG 164 (H) 08/23/2017    TRIG 157 (H) 08/19/2016    TRIG 129 02/15/2016     No components found for: CHOLHDL    MAMMO: May 30, 2017.  Normal findings      Birth Control Method: None  High Risk/Behavior: None      LMP: No LMP recorded. Patient is postmenopausal.  Menstrual Regularity: Not applicable  Flow: Not applicable      Review of Systems:   General:  Denies fever, chills, HA, fatigue, myalgias, weight change    Eyes: Denies vision changes   Ears/Nose/Throat: Denies nasal congestion, rhinorrhea, ear pain or discharge, sore throat, swollen glands  Cardiovascular: Denies CP, palpitations  Respiratory:  Denies SOB, cough  Gastrointestinal:  Denies changes in bowel habits, melena, rectal bleeding,  Genitourinary: Denies changes in urine habits/frequency/dysuria, hematuria   Musculoskeletal:  Denies erythema, edema  Skin: Denies rashes  "  Neurologic: Denies weakness, paresthesia  Psychiatric: Denies mood changes   Endocrine: Denies polyuria, polydipsia, polyphagia  Heme/Lymphatic: Denies problem with bleeding   Allergic/Immunologic: Immunity intact     POSITIVES: arthritis in hands and knees, headaches, small toes tingle, seasonal allergies: pollens      PHYSICAL EXAM:    /64  Pulse 71  Temp 97.7  F (36.5  C)  Resp 18  Ht 5' 6.5\" (1.689 m)  Wt (!) 239 lb (108.4 kg)  SpO2 97%  Breastfeeding? No  BMI 38 kg/m2    Wt Readings from Last 3 Encounters:   08/23/17 (!) 239 lb (108.4 kg)   08/11/16 (!) 243 lb (110.2 kg)   06/25/15 (!) 239 lb 2 oz (108.5 kg)       Body mass index is 38 kg/(m^2).    Well developed, well nourished, no acute distress.  HEENT: normocephalic/atraumatic, PERRLA/EOMI, TMs: Gray, normal light reflex, no nasal discharge.  Oral mucosa: no erythema/exudate  Neck: No LAD/masses/thyromegaly/bruits  Lungs: clear bilaterally  Heart: regular rate and rhythm, no murmurs/gallops/rubs  Breasts: symmetric, no masses/skin changes, nipple discharge, or axillary LAD.  BSE reviewed.  Abdomen: Normal bowel sounds, soft, non-tender, non-distended, no masses, neg Linn's/McBurney's, no rebound/guarding  Genital: Normal external genitalia, no discharge, no lesions, pelvic exam normal  Rectal: internal exam is deferred.  External exam is normal.  Lymphatics: no supraclavicular/axillary/epitrochlear/inguinal LAD. No edema.  Neuro: A&O x 3, CN II-XII intact, strength 5/5, reflexes symmetric, sensory intact to light touch.  Psych: Behavior appropriate, engaging.  Thought processes congruent, non-tangential.  Musculoskeletal: no gross deformities.  Skin: No rashes or lesions, seborrheic keratosis scattered on back      Recent Results (from the past 240 hour(s))   Vitamin D, Total (25-Hydroxy)   Result Value Ref Range    Vitamin D, Total (25-Hydroxy) 44.7 30.0 - 80.0 ng/mL   Glycosylated Hemoglobin A1c   Result Value Ref Range    Hemoglobin A1c " 6.1 (H) 3.5 - 6.0 %   Comprehensive Metabolic Panel   Result Value Ref Range    Sodium 142 136 - 145 mmol/L    Potassium 4.9 3.5 - 5.0 mmol/L    Chloride 104 98 - 107 mmol/L    CO2 31 22 - 31 mmol/L    Anion Gap, Calculation 7 5 - 18 mmol/L    Glucose 100 70 - 125 mg/dL    BUN 15 8 - 22 mg/dL    Creatinine 0.84 0.60 - 1.10 mg/dL    GFR MDRD Af Amer >60 >60 mL/min/1.73m2    GFR MDRD Non Af Amer >60 >60 mL/min/1.73m2    Bilirubin, Total 0.8 0.0 - 1.0 mg/dL    Calcium 9.8 8.5 - 10.5 mg/dL    Protein, Total 6.6 6.0 - 8.0 g/dL    Albumin 4.3 3.5 - 5.0 g/dL    Alkaline Phosphatase 57 45 - 120 U/L    AST 23 0 - 40 U/L    ALT 26 0 - 45 U/L   Lipid [place] FASTING   Result Value Ref Range    Cholesterol 187 <=199 mg/dL    Triglycerides 164 (H) <=149 mg/dL    HDL Cholesterol 51 >=50 mg/dL    LDL Calculated 103 <=129 mg/dL    Patient Fasting > 8hrs? Yes        ASSESSMENT/PLAN: 66 y.o. female physical exam and pelvic exam      The following high BMI interventions were performed this visit: encouragement to exercise    1. Annual physical exam    - Vitamin D, Total (25-Hydroxy)  - Glycosylated Hemoglobin A1c  - Comprehensive Metabolic Panel  - Lipid [place] FASTING    2. Screening for osteoporosis    - DXA Bone Density Scan; Future    3. Asthma    - albuterol (PROAIR HFA;PROVENTIL HFA;VENTOLIN HFA) 90 mcg/actuation inhaler; Inhale 2 puffs every 6 (six) hours as needed for wheezing.  Dispense: 18 g; Refill: 5  - mometasone (ASMANEX TWISTHALER) 220 mcg (60 doses) inhaler; INHALE 1 PUFF TWICE DAILY.  Dispense: 1 each; Refill: 11    4. Hyperlipidemia, unspecified hyperlipidemia type    - simvastatin (ZOCOR) 40 MG tablet; Take 1 tablet (40 mg total) by mouth at bedtime. Needs to be seen for further refills.  Dispense: 90 tablet; Refill: 1    5. Obesity    -Working on exercising regularly    Medications Discontinued During This Encounter   Medication Reason     albuterol (PROVENTIL HFA;VENTOLIN HFA) 90 mcg/actuation inhaler Reorder      mometasone (ASMANEX TWISTHALER) 220 mcg (60 doses) inhaler Reorder     simvastatin (ZOCOR) 40 MG tablet Reorder       Routine health maintenance discussion:  No smoking, limited alcohol (7 or less servings per week), 5 fruits/veg servings per day, 200 minutes of exercise per week.  Daily calcium/vitamin D guidelines, bone health, yearly mammogram after age 39/regular pap smears/colon cancer screening beginning at age 50.  Accident avoidance, sun screen.  She should return to the clinic in 6 months for her medication blood pressure check.    Will contact her with the results of the labs when available.    40 minutes spent together with the patient today, more than 50% spent in counseling, discussing the above topics.        Stacey Lozano , CNP

## 2021-06-13 NOTE — PROGRESS NOTES
1. Irregular heart beat  Electrocardiogram Perform and Read   2. Obesity           ASSESSMENT/PLAN:     The following high BMI interventions were performed this visit: encouragement to exercise and weight monitoring    1. Irregular heart beat    - Electrocardiogram Perform and Read    2. Obesity    -discussed, encouragement to exercise     Return to the clinic if any symptoms related to her irregular heartbeat develop: SOB, dizziness, chest pain, diaphoresis etc.  She is currently asymptomatic today.  EKG showed normal sinus rhythm with an occasional PVC.  She is requesting that a note be written for the [facility] where she goes to donate blood so she is able to continue donating without restrictions.     The visit lasted a total of 25 minutes face to face with the patient.  Over 50% of the time spent counseling and educating the patient about the above.      Stacey Lozano NP          SUBJECTIVE:  Imelda El is a 66 y.o. female who presents to follow-up regarding her irregular heartbeat.  On August 23, 2017 patient was found to have an irregular heartbeat by the [facility] prior to donating blood.  She was seen by myself earlier in the day and no irregular heartbeat was detected on her physical examination.  She has not been allowed to continue donating blood since her irregular heart beat was found.  She is here requesting a letter explaining that she may continue to donate blood.  She denies having any heart palpitations, shortness of breath, chest pressure, lightheadedness or diaphoresis.  She does experience hot flashes due to her postmenopausal symptoms.  Her hot flashes will start in her feet and then moved upwards on the body.  I will go ahead and perform an EKG today to rule out atrial fibrillation or sinus arrhythmia.  She does not smoke or drink large amounts of caffeine.  She denies having any previous history of heart conditions.  She is up-to-date with all of her health  maintenance and recently had her influenza vaccination performed.  Chief Complaint   Patient presents with     Follow-up     irregular heart beat         Patient Active Problem List   Diagnosis     Menopause Has Occurred     Hyperlipidemia     Benign Essential Hypertension     Allergic Rhinitis     Asthma     Obesity     Impaired Fasting Glucose     Tubular adenoma of colon       Current Outpatient Prescriptions   Medication Sig Dispense Refill     albuterol (PROAIR HFA;PROVENTIL HFA;VENTOLIN HFA) 90 mcg/actuation inhaler Inhale 2 puffs every 6 (six) hours as needed for wheezing. 18 g 5     b complex vitamins tablet Take 1 tablet by mouth daily.       fexofenadine (ALLEGRA) 180 MG tablet Take 180 mg by mouth daily.       FLAXSEED OIL ORAL Take by mouth.       lisinopril-hydrochlorothiazide (PRINZIDE,ZESTORETIC) 20-25 mg per tablet TAKE ONE TABLET BY MOUTH ONE TIME DAILY  30 tablet 10     magnesium oxide 500 mg Tab Take by mouth.       mometasone (ASMANEX TWISTHALER) 220 mcg (60 doses) inhaler INHALE 1 PUFF TWICE DAILY. 1 each 11     simvastatin (ZOCOR) 40 MG tablet Take 1 tablet (40 mg total) by mouth at bedtime. Needs to be seen for further refills. 90 tablet 1     VIT C/E/ZN/COPPR/LUTEIN/ZEAXAN (PRESERVISION AREDS 2 ORAL) Take 2 tablets by mouth.       No current facility-administered medications for this visit.        History   Smoking Status     Never Smoker   Smokeless Tobacco     Never Used       REVIEW OF SYSTEMS: Denies radiation, diaphoresis, shortness of breath, dizziness, syncope, nausea, palpitations, and associated with activity.       TOBACCO USE:  History   Smoking Status     Never Smoker   Smokeless Tobacco     Never Used     Social History     Social History     Marital status:      Spouse name: Rob     Number of children: 2     Years of education: 16     Occupational History     retired      Social History Main Topics     Smoking status: Never Smoker     Smokeless tobacco: Never Used      Alcohol use Yes      Comment: 0-1     Drug use: No     Sexual activity: Yes     Partners: Male     Other Topics Concern     Not on file     Social History Narrative         OBJECTIVE:            Vitals:    10/11/17 0825   BP: 132/76   Pulse: 81   Resp: 16   Temp: 97.9  F (36.6  C)   SpO2: 93%     Weight: 240 lb (108.9 kg)  Wt Readings from Last 3 Encounters:   10/11/17 (!) 240 lb (108.9 kg)   08/23/17 (!) 239 lb (108.4 kg)   08/11/16 (!) 243 lb (110.2 kg)     Body mass index is 38.16 kg/(m^2).        Physical Exam:  GENERAL APPEARANCE: A&A, NAD, well hydrated, well nourished  SKIN:  Normal skin turgor, no lesions/rashes   NECK: Supple, without lymphadenopathy, no thyroid mass, no JVD, no bruit  CV: RRR, no M/G/R   LUNGS: CTAB, normal respiratory effort  ABDOMEN: S&NT, no masses, no organomegaly, BS present x4   EXTREMITY: no edema

## 2021-06-14 NOTE — TELEPHONE ENCOUNTER
Refill Approved    Rx renewed per Medication Renewal Policy. Medication was last renewed on 5/7/20.    Giovanni Melendez, [facility] Triage/Med Refill 2/3/2021     Requested Prescriptions   Pending Prescriptions Disp Refills     simvastatin (ZOCOR) 40 MG tablet [Pharmacy Med Name: SIMVASTATIN 40MG TABS] 90 tablet 2     Sig: TAKE ONE TABLET BY MOUTH AT BEDTIME       Statins Refill Protocol (Hmg CoA Reductase Inhibitors) Passed - 2/3/2021 10:53 AM        Passed - PCP or prescribing provider visit in past 12 months      Last office visit with prescriber/PCP: 2/24/2020 Michell Anguiano MD OR same dept: 2/24/2020 Michell Anguiano MD OR same specialty: 2/24/2020 Michell Anguiano MD  Last physical: 9/28/2020 Last MTM visit: Visit date not found   Next visit within 3 mo: Visit date not found  Next physical within 3 mo: Visit date not found  Prescriber OR PCP: Michell Anguiano MD  Last diagnosis associated with med order: 1. Hyperlipidemia, unspecified hyperlipidemia type  - simvastatin (ZOCOR) 40 MG tablet [Pharmacy Med Name: SIMVASTATIN 40MG TABS]; TAKE ONE TABLET BY MOUTH AT BEDTIME  Dispense: 90 tablet; Refill: 2    2. Benign Essential Hypertension  - lisinopriL-hydrochlorothiazide (PRINZIDE,ZESTORETIC) 20-25 mg per tablet [Pharmacy Med Name: LISINOPRIL/HCTZ 20-25MG TABS]; TAKE ONE TABLET BY MOUTH EVERY DAY  Dispense: 90 tablet; Refill: 2    If protocol passes may refill for 12 months if within 3 months of last provider visit (or a total of 15 months).                lisinopriL-hydrochlorothiazide (PRINZIDE,ZESTORETIC) 20-25 mg per tablet [Pharmacy Med Name: LISINOPRIL/HCTZ 20-25MG TABS] 90 tablet 2     Sig: TAKE ONE TABLET BY MOUTH EVERY DAY       Diuretics/Combination Diuretics Refill Protocol  Passed - 2/3/2021 10:53 AM        Passed - Visit with PCP or prescribing provider visit in past 12 months     Last office visit with prescriber/PCP: 2/24/2020 Michell Anguiano MD OR same dept:  2/24/2020 Michell Anguiano MD OR same specialty: 2/24/2020 Michell Anguiano MD  Last physical: 9/28/2020 Last MTM visit: Visit date not found   Next visit within 3 mo: Visit date not found  Next physical within 3 mo: Visit date not found  Prescriber OR PCP: Michell Anguiano MD  Last diagnosis associated with med order: 1. Hyperlipidemia, unspecified hyperlipidemia type  - simvastatin (ZOCOR) 40 MG tablet [Pharmacy Med Name: SIMVASTATIN 40MG TABS]; TAKE ONE TABLET BY MOUTH AT BEDTIME  Dispense: 90 tablet; Refill: 2    2. Benign Essential Hypertension  - lisinopriL-hydrochlorothiazide (PRINZIDE,ZESTORETIC) 20-25 mg per tablet [Pharmacy Med Name: LISINOPRIL/HCTZ 20-25MG TABS]; TAKE ONE TABLET BY MOUTH EVERY DAY  Dispense: 90 tablet; Refill: 2    If protocol passes may refill for 12 months if within 3 months of last provider visit (or a total of 15 months).             Passed - Serum Potassium in past 12 months      Lab Results   Component Value Date    Potassium 4.5 09/28/2020             Passed - Serum Sodium in past 12 months      Lab Results   Component Value Date    Sodium 142 09/28/2020             Passed - Blood pressure on file in past 12 months     BP Readings from Last 1 Encounters:   09/28/20 138/72             Passed - Serum Creatinine in past 12 months      Creatinine   Date Value Ref Range Status   09/28/2020 0.85 0.60 - 1.10 mg/dL Final

## 2021-06-17 NOTE — PATIENT INSTRUCTIONS - HE
Patient Instructions by Michell Anguiano MD at 8/27/2019  8:00 AM     Author: Michell Anguiano MD Service: -- Author Type: Physician    Filed: 8/27/2019  8:10 AM Encounter Date: 8/27/2019 Status: Signed    : Michell Anguiano MD (Physician)         Patient Education     Exercise for a Healthier Heart  You may wonder how you can improve the health of your heart. If youre thinking about exercise, youre on the right track. You dont need to become an athlete, but you do need a certain amount of brisk exercise to help strengthen your heart. If you have been diagnosed with a heart condition, your doctor may recommend exercise to help stabilize your condition. To help make exercise a habit, choose safe, fun activities.       Be sure to check with your health care provider before starting an exercise program.    Why exercise?  Exercising regularly offers many healthy rewards. It can help you do all of the following:    Improve your blood cholesterol levels to help prevent further heart trouble    Lower your blood pressure to help prevent a stroke or heart attack    Control diabetes, or reduce your risk of getting this disease    Improve your heart and lung function    Reach and maintain a healthy weight    Make your muscles stronger and more limber so you can stay active    Prevent falls and fractures by slowing the loss of bone mass (osteoporosis)    Manage stress better  Exercise tips  Ease into your routine. Set small goals. Then build on them.  Exercise on most days. Aim for a total of 150 or more minutes of moderate to  vigorous intensity activity each week. Consider 40 minutes, 3 to 4 times a week. For best results, activity should last for 40 minutes on average. It is OK to work up to the 40 minute period over time. Examples of moderate-intensity activity is walking one mile in 15 minutes or 30 to 45 minutes of yard work.  Step up your daily activity level. Along with your exercise program,  try being more active throughout the day. Walk instead of drive. Do more household tasks or yard work.  Choose one or more activities you enjoy. Walking is one of the easiest things you can do. You can also try swimming, riding a bike, or taking an exercise class.  Stop exercising and call your doctor if you:    Have chest pain or feel dizzy or lightheaded    Feel burning, tightness, pressure, or heaviness in your chest, neck, shoulders, back, or arms    Have unusual shortness of breath    Have increased joint or muscle pain    Have palpitations or an irregular heartbeat      0192-9093 [employer]. [address] [place] [address]. All rights reserved. This information is not intended as a substitute for professional medical care. Always follow your healthcare professional's instructions.         Patient Education   Understanding Advance Care Planning  Advance care planning is the process of deciding ones own future medical care. It helps ensure that if you cant speak for yourself, your wishes can still be carried out. The plan is a series of legal documents that note a persons wishes. The documents vary by state. Advance care planning may be done when a person has a serious illness that is expected to get worse. It may be done before major surgery. And it can help you and your family be prepared in case of a major illness or injury. Advance care planning helps with making decisions at these times.       A health care proxy is a person who acts as the voice of a patient when the patient cant speak for himself or herself. The name of this role varies by state. It may be called a Durable Medical Power of  or Durable Power of  for Healthcare. It may be called an agent, surrogate, or advocate. Or it may be called a representative or decision maker. It is an official duty that is identified by a legal document. The document also varies by state.    Why Is Advance Care Planning  Important?  If a person communicates their healthcare wishes:    They will be given medical care that matches their values and goals.    Their family members will not be forced to make decisions in a crisis with no guidance.  Creating a Plan  Making an advance care plan is often done in 3 steps:    Thinking about ones wishes. To create an advance care plan, you should think about what kind of medical treatment you would want if you lose the ability to communicate. Are there any situations in which you would refuse or stop treatment? Are there therapies you would want or not want? And whom do you want to make decisions for you? There are many places to learn more about how to plan for your care. Ask your doctor or  for resources.    Picking a health care proxy. This means choosing a trusted person to speak for you only when you cant speak for yourself. When you cannot make medical decisions, your proxy makes sure the instructions in your advance care plan are followed. A proxy does not make decisions based on his or her own opinions. They must put aside those opinions and values if needed, and carry out your wishes.    Filling out the legal documents. There are several kinds of legal documents for advance care planning. Each one tells health care providers your wishes. The documents may vary by state. They must be signed and may need to be witnessed or notarized. You can cancel or change them whenever you wish. Depending on your state, the documents may include a Healthcare Proxy form, Living Will, Durable Medical Power of , Advance Directive, or others.  The Familys Role  The best help a family can give is to support their loved ones wishes. Open and honest communication is vital. Family should express any concerns they have about the patients choices while the patient can still make decisions.    9641-4409 [employer]. [address] [place] [address], [place] [address]. All rights  reserved. This information is not intended as a substitute for professional medical care. Always follow your healthcare professional's instructions.         Also, [facility] offers a free, downloadable health care directive that allows you to share your treatment choices and personal preferences if you cannot communicate your wishes. It also allows you to appoint another person (called a health care agent) to make health care decisions if you are unable to do so. You can download an advance directive by going here: [email or web address]     Patient Education   Personalized Prevention Plan  You are due for the preventive services outlined below.  Your care team is available to assist you in scheduling these services.  If you have already completed any of these items, please share that information with your care team to update in your medical record.  Health Maintenance   Topic Date Due   ? HEPATITIS C SCREENING  1950   ? ZOSTER VACCINES (2 of 3) 08/14/2012   ? INFLUENZA VACCINE RULE BASED (1) 08/01/2019   ? ASTHMA CONTROL TEST  08/24/2019   ? FALL RISK ASSESSMENT  08/24/2019   ? DXA SCAN  08/25/2019   ? MEDICARE ANNUAL WELLNESS VISIT  08/24/2019   ? MAMMOGRAM  07/17/2021   ? COLONOSCOPY  07/22/2021   ? ADVANCE CARE PLANNING  08/24/2023   ? TD 18+ HE  08/24/2028   ? PNEUMOCOCCAL POLYSACCHARIDE VACCINE AGE 65 AND OVER  Completed   ? PNEUMOCOCCAL CONJUGATE VACCINE FOR ADULTS (PCV13 OR PREVNAR)  Completed

## 2021-06-18 NOTE — PATIENT INSTRUCTIONS - HE
Patient Instructions by Michell Anguiano MD at 9/28/2020  9:00 AM     Author: Michell Anguiano MD Service: -- Author Type: Physician    Filed: 9/28/2020  9:17 AM Encounter Date: 9/28/2020 Status: Signed    : Michell Anguiano MD (Physician)         Patient Education     Exercise for a Healthier Heart  You may wonder how you can improve the health of your heart. If youre thinking about exercise, youre on the right track. You dont need to become an athlete, but you do need a certain amount of brisk exercise to help strengthen your heart. If you have been diagnosed with a heart condition, your doctor may recommend exercise to help stabilize your condition. To help make exercise a habit, choose safe, fun activities.       Be sure to check with your health care provider before starting an exercise program.    Why exercise?  Exercising regularly offers many healthy rewards. It can help you do all of the following:    Improve your blood cholesterol levels to help prevent further heart trouble    Lower your blood pressure to help prevent a stroke or heart attack    Control diabetes, or reduce your risk of getting this disease    Improve your heart and lung function    Reach and maintain a healthy weight    Make your muscles stronger and more limber so you can stay active    Prevent falls and fractures by slowing the loss of bone mass (osteoporosis)    Manage stress better  Exercise tips  Ease into your routine. Set small goals. Then build on them.  Exercise on most days. Aim for a total of 150 or more minutes of moderate to  vigorous intensity activity each week. Consider 40 minutes, 3 to 4 times a week. For best results, activity should last for 40 minutes on average. It is OK to work up to the 40 minute period over time. Examples of moderate-intensity activity is walking one mile in 15 minutes or 30 to 45 minutes of yard work.  Step up your daily activity level. Along with your exercise program,  try being more active throughout the day. Walk instead of drive. Do more household tasks or yard work.  Choose one or more activities you enjoy. Walking is one of the easiest things you can do. You can also try swimming, riding a bike, or taking an exercise class.  Stop exercising and call your doctor if you:    Have chest pain or feel dizzy or lightheaded    Feel burning, tightness, pressure, or heaviness in your chest, neck, shoulders, back, or arms    Have unusual shortness of breath    Have increased joint or muscle pain    Have palpitations or an irregular heartbeat      [id number] [employer]. [address] [place] [address]. All rights reserved. This information is not intended as a substitute for professional medical care. Always follow your healthcare professional's instructions.         Patient Education   Understanding Organic Pizza Kitchen MyPlate  The USDA (US Department of Agriculture) has guidelines to help you make healthy food choices. These are called MyPlate. MyPlate shows the food groups that make up healthy meals using the image of a place setting. Before you eat, think about the healthiest choices for what to put onto your plate or into your cup or bowl. To learn more about building a healthy plate, visit www.choosemyplate.gov.       The Food Groups    Fruits: Any fruit or 100% fruit juice counts as part of the Fruit Group. Fruits may be fresh, canned, frozen, or dried, and may be whole, cut-up, or pureed. Make half your plate fruits and vegetables.    Vegetables: Any vegetable or 100% vegetable juice counts as a member of the Vegetable Group. Vegetables may be fresh, frozen, canned, or dried. They can be served raw or cooked and may be whole, cut-up, or mashed. Make half your plate fruits and vegetables.     Grains: All foods made from grains are part of the Grains Group. These include wheat, rice, oats, cornmeal, and barley such as bread, pasta, oatmeal, cereal, tortillas, and grits.  Grains should be no more than a quarter of your plate. At least half of your grains should be whole grains.    Protein: This group includes meat, poultry, seafood, beans and peas, eggs, processed soy products (like tofu), nuts (including nut butters), and seeds. Make protein choices no more than a quarter of your plate. Meat and poultry choices should be lean or low fat.    Dairy: All fluid milk products and foods made from milk that contain calcium, like yogurt and cheese are part of the Dairy Group. (Foods that have little calcium, such as cream, butter, and cream cheese, are not part of the group.) Most dairy choices should be low-fat or fat-free.    Oils: These are fats that are liquid at room temperature. They include canola, corn, olive, soybean, and sunflower oil. Foods that are mainly oil include mayonnaise, certain salad dressings, and soft margarines. You should have only 5 to 7 teaspoons of oils a day. You probably already get this much from the food you eat.  Use [employer] to Help Build Your Meals  The SuperTracker can help you plan and track your meals and activity. You can look up individual foods to see or compare their nutritional value. You can get guidelines for what and how much you should eat. You can compare your food choices. And you can assess personal physical activities and see ways you can improve. Go to www.[employer].gov/supertracker/.    [id number] [employer]. [address] [place] [address], [place] [address]. All rights reserved. This information is not intended as a substitute for professional medical care. Always follow your healthcare professional's instructions.             Advance Directive  Patients advance directive was discussed and I am comfortable with the patients wishes.  Patient Education   Personalized Prevention Plan  You are due for the preventive services outlined below.  Your care team is available to assist you in scheduling these services.  If you have  already completed any of these items, please share that information with your care team to update in your medical record.  Health Maintenance   Topic Date Due   ? ASTHMA ACTION PLAN  1950   ? INFLUENZA VACCINE RULE BASED (1) 08/01/2020   ? DXA SCAN  08/25/2020   ? Asthma Control Test  08/27/2020   ? MEDICARE ANNUAL WELLNESS VISIT  08/27/2020   ? FALL RISK ASSESSMENT  08/27/2020   ? PNEUMOCOCCAL IMMUNIZATION 65+ LOW/MEDIUM RISK (2 of 2 - PPSV23) 06/25/2020   ? MAMMOGRAM  07/28/2022   ? LIPID  08/27/2024   ? ADVANCE CARE PLANNING  08/27/2024   ? COLORECTAL CANCER SCREENING  07/22/2026   ? TD 18+ HE  08/24/2028   ? HEPATITIS C SCREENING  Completed   ? ZOSTER VACCINES  Completed   ? HEPATITIS B VACCINES  Aged Out

## 2021-06-20 NOTE — PROGRESS NOTES
Assessment and Plan:         1. Medicare annual wellness visit, subsequent    2. Benign Essential Hypertension      We reviewed dietary recommendations, including low salt and high fiber diet, and  recommendations for regular exercise/activity.   - lisinopril-hydrochlorothiazide (PRINZIDE,ZESTORETIC) 20-25 mg per tablet; Take 1 tablet by mouth daily.  Dispense: 90 tablet; Refill: 3  - Comprehensive Metabolic Panel    3. Hyperlipidemia, unspecified hyperlipidemia type      We discussed limiting saturated and trans fats in her diet and using more of the good fats such as olive oil, canola oil, flax seed and peanut oil, etc.    - simvastatin (ZOCOR) 40 MG tablet; Take 1 tablet (40mg) by oral route once daily at bedtime  Dispense: 90 tablet; Refill: 3  - Lipid Cascade  - Comprehensive Metabolic Panel    4. Asthma     We reviewed indications for evaluation and she will continue her same medications.   - mometasone (ASMANEX TWISTHALER) 220 mcg (60 doses) inhaler; INHALE 1 PUFF TWICE DAILY.  Dispense: 1 each; Refill: 11  - albuterol (PROAIR HFA;PROVENTIL HFA;VENTOLIN HFA) 90 mcg/actuation inhaler; Inhale 2 puffs every 6 (six) hours as needed for wheezing.  Dispense: 18 g; Refill: 5    5. Need for vaccination  - Td, Adult, PF        The patient's current medical problems were reviewed.    I have had an Advance Directives discussion with the patient.  The following high BMI interventions were performed this visit: encouragement to exercise and lifestyle education regarding diet  The following health maintenance schedule was reviewed with the patient and provided in printed form in the after visit summary:   Health Maintenance   Topic Date Due     TD 18+ HE  04/15/2018     INFLUENZA VACCINE RULE BASED (1) 08/01/2018     ASTHMA CONTROL TEST  08/23/2018     ASTHMA FOLLOW-UP  08/23/2018     MAMMOGRAM  05/30/2019     FALL RISK ASSESSMENT  08/24/2019     DXA SCAN  08/25/2019     COLONOSCOPY  07/22/2021     ADVANCE DIRECTIVES  DISCUSSED WITH PATIENT  08/11/2021     PNEUMOCOCCAL POLYSACCHARIDE VACCINE AGE 65 AND OVER  Completed     PNEUMOCOCCAL CONJUGATE VACCINE FOR ADULTS (PCV13 OR PREVNAR)  Completed     ZOSTER VACCINE  Completed        Subjective:   Chief Complaint: Imelda El is an 67 y.o. female here for a Welcome to Medicare visit.   HPI:    Fasting today? Yes    Hyperlipidemia & Hypertension      Patient is also here for follow-up of hypertension and dyslipidemia. A repeat fasting lipid profile was done. Compliance with treatment has been good. Patient denies  muscle pain associated with her medications. Current symptoms: none. Patient denies chest pain, dyspnea, exertional chest pressure/discomfort, fatigue, lower extremity edema and near-syncope. The patient exercises intermittently. Weight trend: fluctuating a bit.      Previous history of cardiac disease includes: none.    Review of Systems:   Please see above.  The rest of the review of systems are negative for all systems.  Mood ok. Sleeping ok, some noc sweats, hot flushes - started after hyst  Retired, able to nap if needed      Patient Care Team:  Michell Anguiano MD as PCP - General     Patient Active Problem List   Diagnosis     Menopause Has Occurred     Hyperlipidemia     Benign Essential Hypertension     Allergic Rhinitis     Asthma     Obesity     Impaired Fasting Glucose     Tubular adenoma of colon     Past Medical History:   Diagnosis Date     Allergic rhinitis      Asthma      Hyperlipemia      Hypertension      Irregular heart beat      Obese       Past Surgical History:   Procedure Laterality Date     HYSTERECTOMY       OOPHORECTOMY Left      CT DILATION/CURETTAGE,DIAGNOSTIC      Description: Dilation And Curettage;  Recorded: 04/15/2008;     CT HYSTEROSCOPY,W/ENDOMETRIAL ABLATION      Description: Hysteroscopy With Endometrial Ablation;  Recorded: 04/15/2008;     CT LIGATE FALLOPIAN TUBE      Description: Tubal Ligation;  Recorded: 04/15/2008;      "NM TOTAL ABDOM HYSTERECTOMY      Description: Total Abdominal Hysterectomy;  Recorded: 04/17/2009;  Comments: LSO      Family History   Problem Relation Age of Onset     Breast cancer Mother 81     No Medical Problems Sister      No Medical Problems Brother      Diabetes Maternal Grandmother      No Medical Problems Sister      No Medical Problems Sister       Social History     Social History     Marital status:      Spouse name: Rob     Number of children: 2     Years of education: 16     Occupational History     retired      Social History Main Topics     Smoking status: Never Smoker     Smokeless tobacco: Never Used     Alcohol use Yes      Comment: 0-1     Drug use: No     Sexual activity: Yes     Partners: Male     Other Topics Concern     Not on file     Social History Narrative       Current Outpatient Prescriptions   Medication Sig Dispense Refill     albuterol (PROAIR HFA;PROVENTIL HFA;VENTOLIN HFA) 90 mcg/actuation inhaler Inhale 2 puffs every 6 (six) hours as needed for wheezing. 18 g 5     b complex vitamins tablet Take 1 tablet by mouth daily.       fexofenadine (ALLEGRA) 180 MG tablet Take 180 mg by mouth daily.       lisinopril-hydrochlorothiazide (PRINZIDE,ZESTORETIC) 20-25 mg per tablet Take 1 tablet by mouth daily. 90 tablet 3     magnesium oxide 500 mg Tab Take by mouth.       mometasone (ASMANEX TWISTHALER) 220 mcg (60 doses) inhaler INHALE 1 PUFF TWICE DAILY. 1 each 11     simvastatin (ZOCOR) 40 MG tablet Take 1 tablet (40mg) by oral route once daily at bedtime 90 tablet 3     VIT C/E/ZN/COPPR/LUTEIN/ZEAXAN (PRESERVISION AREDS 2 ORAL) Take 2 tablets by mouth.       No current facility-administered medications for this visit.       Objective:   Vital Signs:   Visit Vitals     /70     Pulse 79     Temp 98.1  F (36.7  C) (Oral)     Resp 20     Ht 5' 6.75\" (1.695 m)     Wt (!) 248 lb (112.5 kg)     SpO2 97%     BMI 39.13 kg/m2      VisionScreening:   Visual Acuity Screening    Right " eye Left eye Both eyes   Without correction:      With correction: 10/16 10/16 10/12.5      PHYSICAL EXAM  General: alert, pleasant, and no distress  Head: Normocephalic, without obvious abnormality, atraumatic  Eyes: conjunctivae and sclerae normal and pupils equal, round, reactive to   light and accomodation  Ears: normal TM's and external ear canals both ears  Nose: no discharge, no sinus tenderness  Throat: lips, mucosa, and tongue normal; teeth and gums normal  Neck: no adenopathy, supple, symmetrical, trachea midline and thyroid normal  Back: symmetric, ROM normal. No CVA tenderness.  Lungs: clear to auscultation bilaterally  Breasts: normal appearance, no masses or tenderness  Heart : regular rate and rhythm and no murmurs  Abdomen:  bowel sounds normal, no masses palpable and soft, non-tender  Pelvic: exam deferred   Extremities: extremities normal, atraumatic, no cyanosis or edema  Pulses: 2+ and symmetric  Skin: Skin color, texture, turgor normal. No rashes or lesions  Lymph nodes: Cervical, supraclavicular, and axillary nodes normal.  Neurologic: Grossly normal            Assessment Results 8/24/2018   Activities of Daily Living No help needed   Instrumental Activities of Daily Living No help needed   Mini Cog Total Score 5   Some recent data might be hidden     A Mini Cog score of 0-2 suggests the possibility of dementia, score of 3-5 suggests no dementia    Identified Health Risks:     She is at risk for lack of exercise and has been provided with information to increase physical activity for the benefit of her well-being.  The patient was counseled and encouraged to consider modifying their diet and eating habits. She was provided with information on recommended healthy diet options.  Patient's advanced directive was discussed and I am comfortable with the patient's wishes.      Results for orders placed or performed in visit on 08/24/18   Lipid Cascade   Result Value Ref Range    Cholesterol 177 <=199  mg/dL    Triglycerides 199 (H) <=149 mg/dL    HDL Cholesterol 48 (L) >=50 mg/dL    LDL Calculated 89 <=129 mg/dL    Patient Fasting > 8hrs? Yes    Comprehensive Metabolic Panel   Result Value Ref Range    Sodium 142 136 - 145 mmol/L    Potassium 4.3 3.5 - 5.0 mmol/L    Chloride 104 98 - 107 mmol/L    CO2 29 22 - 31 mmol/L    Anion Gap, Calculation 9 5 - 18 mmol/L    Glucose 111 70 - 125 mg/dL    BUN 14 8 - 22 mg/dL    Creatinine 0.75 0.60 - 1.10 mg/dL    GFR MDRD Af Amer >60 >60 mL/min/1.73m2    GFR MDRD Non Af Amer >60 >60 mL/min/1.73m2    Bilirubin, Total 0.7 0.0 - 1.0 mg/dL    Calcium 9.9 8.5 - 10.5 mg/dL    Protein, Total 6.4 6.0 - 8.0 g/dL    Albumin 4.2 3.5 - 5.0 g/dL    Alkaline Phosphatase 58 45 - 120 U/L    AST 28 0 - 40 U/L    ALT 34 0 - 45 U/L

## 2021-06-20 NOTE — LETTER
Letter by Michell Anguiano MD at      Author: Michell Anguiano MD Service: -- Author Type: --    Filed:  Encounter Date: 3/8/2020 Status: (Other)         March 9, 2020     Patient: Imelda El   YOB: 1950   Date of Visit: 3/8/2020       To Whom It May Concern:    It is my medical opinion that Imelda El carries the diagnoses of asthma and allergic rhinitis, both of which can result in cough. She is on appropriate treatment, but may still have some residual cough due to the nature of the underlying conditions.    If you have any questions or concerns, please don't hesitate to call.    Sincerely,    Electronically signed by Michell Anguiano MD   [facility]  (Formerly [facility])

## 2021-06-27 ENCOUNTER — HEALTH MAINTENANCE LETTER (OUTPATIENT)
Age: 71
End: 2021-06-27

## 2021-06-29 ENCOUNTER — RECORDS - HEALTHEAST (OUTPATIENT)
Dept: ADMINISTRATIVE | Facility: OTHER | Age: 71
End: 2021-06-29

## 2021-07-03 NOTE — ADDENDUM NOTE
Addendum Note by Tree Ferraro MD at 3/11/2020  1:07 PM     Author: Tree Ferraro MD Service: -- Author Type: Physician    Filed: 3/11/2020  1:07 PM Encounter Date: 3/8/2020 Status: Signed    : Tree Ferraro MD (Physician)    Addended by: TREE FERRARO on: 3/11/2020 01:07 PM        Modules accepted: Orders

## 2021-07-13 ENCOUNTER — RECORDS - HEALTHEAST (OUTPATIENT)
Dept: ADMINISTRATIVE | Facility: CLINIC | Age: 71
End: 2021-07-13

## 2021-07-21 ENCOUNTER — RECORDS - HEALTHEAST (OUTPATIENT)
Dept: ADMINISTRATIVE | Facility: CLINIC | Age: 71
End: 2021-07-21

## 2021-07-23 ENCOUNTER — TRANSFERRED RECORDS (OUTPATIENT)
Dept: HEALTH INFORMATION MANAGEMENT | Facility: CLINIC | Age: 71
End: 2021-07-23

## 2021-07-23 ENCOUNTER — RECORDS - HEALTHEAST (OUTPATIENT)
Dept: ADMINISTRATIVE | Facility: CLINIC | Age: 71
End: 2021-07-23

## 2021-08-30 ENCOUNTER — OFFICE VISIT (OUTPATIENT)
Dept: FAMILY MEDICINE | Facility: CLINIC | Age: 71
End: 2021-08-30
Payer: COMMERCIAL

## 2021-08-30 VITALS
HEART RATE: 68 BPM | SYSTOLIC BLOOD PRESSURE: 130 MMHG | BODY MASS INDEX: 39.14 KG/M2 | WEIGHT: 249.38 LBS | OXYGEN SATURATION: 96 % | HEIGHT: 67 IN | DIASTOLIC BLOOD PRESSURE: 70 MMHG

## 2021-08-30 DIAGNOSIS — R15.9 INCONTINENCE OF FECES WITH FECAL URGENCY: ICD-10-CM

## 2021-08-30 DIAGNOSIS — R73.01 IMPAIRED FASTING GLUCOSE: ICD-10-CM

## 2021-08-30 DIAGNOSIS — Z00.00 ROUTINE ADULT HEALTH MAINTENANCE: Primary | ICD-10-CM

## 2021-08-30 DIAGNOSIS — R15.2 INCONTINENCE OF FECES WITH FECAL URGENCY: ICD-10-CM

## 2021-08-30 DIAGNOSIS — E78.2 MIXED HYPERLIPIDEMIA: ICD-10-CM

## 2021-08-30 DIAGNOSIS — Z00.00 ENCOUNTER FOR MEDICARE ANNUAL WELLNESS EXAM: ICD-10-CM

## 2021-08-30 DIAGNOSIS — J45.31 MILD PERSISTENT ASTHMA WITH ACUTE EXACERBATION: ICD-10-CM

## 2021-08-30 DIAGNOSIS — I10 ESSENTIAL HYPERTENSION, BENIGN: ICD-10-CM

## 2021-08-30 LAB
ALBUMIN SERPL-MCNC: 4.3 G/DL (ref 3.5–5)
ALP SERPL-CCNC: 60 U/L (ref 45–120)
ALT SERPL W P-5'-P-CCNC: 29 U/L (ref 0–45)
ANION GAP SERPL CALCULATED.3IONS-SCNC: 10 MMOL/L (ref 5–18)
AST SERPL W P-5'-P-CCNC: 22 U/L (ref 0–40)
BILIRUB SERPL-MCNC: 0.8 MG/DL (ref 0–1)
BUN SERPL-MCNC: 14 MG/DL (ref 8–28)
CALCIUM SERPL-MCNC: 10.1 MG/DL (ref 8.5–10.5)
CHLORIDE BLD-SCNC: 103 MMOL/L (ref 98–107)
CHOLEST SERPL-MCNC: 171 MG/DL
CO2 SERPL-SCNC: 29 MMOL/L (ref 22–31)
CREAT SERPL-MCNC: 0.79 MG/DL (ref 0.6–1.1)
ERYTHROCYTE [DISTWIDTH] IN BLOOD BY AUTOMATED COUNT: 12.6 % (ref 10–15)
FASTING STATUS PATIENT QL REPORTED: YES
GFR SERPL CREATININE-BSD FRML MDRD: 76 ML/MIN/1.73M2
GLUCOSE BLD-MCNC: 112 MG/DL (ref 70–125)
HBA1C MFR BLD: 6.4 % (ref 0–5.6)
HCT VFR BLD AUTO: 42 % (ref 35–47)
HDLC SERPL-MCNC: 57 MG/DL
HGB BLD-MCNC: 13.7 G/DL (ref 11.7–15.7)
LDLC SERPL CALC-MCNC: 84 MG/DL
MCH RBC QN AUTO: 30.4 PG (ref 26.5–33)
MCHC RBC AUTO-ENTMCNC: 32.6 G/DL (ref 31.5–36.5)
MCV RBC AUTO: 93 FL (ref 78–100)
PLATELET # BLD AUTO: 273 10E3/UL (ref 150–450)
POTASSIUM BLD-SCNC: 4.2 MMOL/L (ref 3.5–5)
PROT SERPL-MCNC: 6.8 G/DL (ref 6–8)
RBC # BLD AUTO: 4.51 10E6/UL (ref 3.8–5.2)
SODIUM SERPL-SCNC: 142 MMOL/L (ref 136–145)
TRIGL SERPL-MCNC: 148 MG/DL
WBC # BLD AUTO: 5.8 10E3/UL (ref 4–11)

## 2021-08-30 PROCEDURE — 99397 PER PM REEVAL EST PAT 65+ YR: CPT | Performed by: FAMILY MEDICINE

## 2021-08-30 PROCEDURE — 80061 LIPID PANEL: CPT | Performed by: FAMILY MEDICINE

## 2021-08-30 PROCEDURE — 99214 OFFICE O/P EST MOD 30 MIN: CPT | Mod: 25 | Performed by: FAMILY MEDICINE

## 2021-08-30 PROCEDURE — 85027 COMPLETE CBC AUTOMATED: CPT | Performed by: FAMILY MEDICINE

## 2021-08-30 PROCEDURE — 83036 HEMOGLOBIN GLYCOSYLATED A1C: CPT | Performed by: FAMILY MEDICINE

## 2021-08-30 PROCEDURE — 36415 COLL VENOUS BLD VENIPUNCTURE: CPT | Performed by: FAMILY MEDICINE

## 2021-08-30 PROCEDURE — 80053 COMPREHEN METABOLIC PANEL: CPT | Performed by: FAMILY MEDICINE

## 2021-08-30 RX ORDER — SODIUM PHOSPHATE,MONO-DIBASIC 19G-7G/118
ENEMA (ML) RECTAL
COMMUNITY

## 2021-08-30 RX ORDER — FERROUS SULFATE 324(65)MG
TABLET, DELAYED RELEASE (ENTERIC COATED) ORAL
COMMUNITY

## 2021-08-30 ASSESSMENT — MIFFLIN-ST. JEOR: SCORE: 1679.82

## 2021-08-30 ASSESSMENT — ACTIVITIES OF DAILY LIVING (ADL): CURRENT_FUNCTION: NO ASSISTANCE NEEDED

## 2021-08-30 NOTE — PROGRESS NOTES
"SUBJECTIVE:   Imelda El is a 70 year old female who presents for Preventive Visit.      Patient has been advised of split billing requirements and indicates understanding: Yes   Are you in the first 12 months of your Medicare coverage?  No    Healthy Habits:     In general, how would you rate your overall health?  Good    Duration of exercise:  Less than 15 minutes    Do you usually eat at least 4 servings of fruit and vegetables a day, include whole grains    & fiber and avoid regularly eating high fat or \"junk\" foods?  No    Taking medications regularly:  Yes    Medication side effects:  None    Ability to successfully perform activities of daily living:  No assistance needed    Home Safety:  No safety concerns identified    Hearing Impairment:  No hearing concerns    In the past 6 months, have you been bothered by leaking of urine?  No    In general, how would you rate your overall mental or emotional health?  Excellent      PHQ-2 Total Score: 2    Do you feel safe in your environment? Yes    Have you ever done Advance Care Planning? (For example, a Health Directive, POLST, or a discussion with a medical provider or your loved ones about your wishes): No, advance care planning information given to patient to review.  Patient plans to discuss their wishes with loved ones or provider.       Fall risk             Patient is also here for follow-up of elevated blood pressure and hyperlipidemia. A repeat fasting lipid profile was done. Compliance with treatment has been good. Patient denies muscle pain associated with her medications. She is currently taking lisinopril-hydrochlorothiazide, simvastatin. Current side effects include: none. Associated signs and symptoms: none. Denies chest pain, dyspnea, palpitations, peripheral edema and tiredness/fatigue. The patient reports sporadic irregular exercise. Weight trend: has been stable.              Previous history of cardiac disease includes: none.         She " reports occasional difficulty with fecal urgency, and occasional incontinence. She reports that one morning recently she had coffee and breakfast then had an accident trying to get her garage door open. It also happened once on cruise ship. She notes rare constipation. Bowels are usually formed, but liquid when she has those accidents.         She recently lost her cat - thyroid condition, diabetes.      Cognitive Screening   1) Repeat 3 items (Leader, Season, Table)    2) Clock draw: NORMAL  3) 3 item recall: Recalls 2 objects   Results: NORMAL clock, 1-2 items recalled: COGNITIVE IMPAIRMENT LESS LIKELY    Mini-CogTM Copyright S Marilynn. Licensed by the author for use in [facility]; reprinted with permission (sheyla@[facility]). All rights reserved.      Do you have sleep apnea, excessive snoring or daytime drowsiness?: yes    Reviewed and updated as needed this visit by clinical staff  Tobacco  Allergies  Meds   Med Hx  Surg Hx  Fam Hx  Soc Hx        Reviewed and updated as needed this visit by Provider  Tobacco  Allergies  Meds   Med Hx  Surg Hx  Fam Hx  Soc Hx       Social History     Tobacco Use     Smoking status: Never Smoker     Smokeless tobacco: Never Used   Substance Use Topics     Alcohol use: Yes     Comment: Alcoholic Drinks/day: 0-1     If you drink alcohol do you typically have >3 drinks per day or >7 drinks per week? No    Alcohol Use 8/30/2021   Prescreen: >3 drinks/day or >7 drinks/week? No       Current providers sharing in care for this patient include:   Patient Care Team:  Michell Anguiano MD as PCP - General (Family Practice)  Michell Anguiano MD as Assigned PCP    The following health maintenance items are reviewed in Epic and correct as of today:  Health Maintenance Due   Topic Date Due     ANNUAL REVIEW OF HM ORDERS  Never done     ASTHMA ACTION PLAN  Never done     DEXA  08/25/2020     ASTHMA CONTROL TEST  03/28/2021     Pneumococcal Vaccine: 65+ Years (2 of 2 -  "PPSV23) 08/11/2021     INFLUENZA VACCINE (1) 09/01/2021     MEDICARE ANNUAL WELLNESS VISIT  09/28/2021     FALL RISK ASSESSMENT  09/28/2021       Mammogram Screening: Mammogram Screening: Recommended mammography every 1-2 years with patient discussion and risk factor consideration        Review of Systems  12 point ROS negative except as noted above       OBJECTIVE:   /70 (Patient Position: Sitting, Cuff Size: Adult Regular)   Pulse 68   Ht 1.695 m (5' 6.75\")   Wt 113.1 kg (249 lb 6 oz)   SpO2 96%   BMI 39.35 kg/m   Estimated body mass index is 39.35 kg/m  as calculated from the following:    Height as of this encounter: 1.695 m (5' 6.75\").    Weight as of this encounter: 113.1 kg (249 lb 6 oz).  Physical Exam  General: alert, pleasant, and no distress  Head: Normocephalic, without obvious abnormality, atraumatic  Eyes: conjunctivae and sclerae normal and pupils equal, round, reactive to   light and accomodation  Ears: normal TM's and external ear canals both ears  Nose: no discharge, no sinus tenderness  Throat: lips, mucosa, and tongue normal; teeth and gums normal  Neck: no adenopathy, supple, symmetrical, trachea midline and thyroid normal  Back: symmetric, ROM normal. No CVA tenderness.  Lungs: clear to auscultation bilaterally  Breasts: normal appearance, no masses or tenderness  Heart : regular rate and rhythm and no murmurs  Abdomen:  bowel sounds normal, no masses palpable and soft, non-tender  Pelvic: exam deferred   Extremities: extremities normal, atraumatic, no cyanosis or edema  Pulses: 2+ and symmetric  Skin: Skin color, texture, turgor normal. No rashes or lesions  Lymph nodes: Cervical, supraclavicular, and axillary nodes normal.  Neurologic: Grossly normal          Results for orders placed or performed in visit on 08/30/21   Lipid panel reflex to direct LDL Fasting     Status: None   Result Value Ref Range    Cholesterol 171 <=199 mg/dL    Triglycerides 148 <=149 mg/dL    Direct Measure " HDL 57 >=50 mg/dL    LDL Cholesterol Calculated 84 <=129 mg/dL    Patient Fasting > 8hrs? Yes    Hemoglobin A1c     Status: Abnormal   Result Value Ref Range    Hemoglobin A1C 6.4 (H) 0.0 - 5.6 %   Comprehensive metabolic panel (BMP + Alb, Alk Phos, ALT, AST, Total. Bili, TP)     Status: Normal   Result Value Ref Range    Sodium 142 136 - 145 mmol/L    Potassium 4.2 3.5 - 5.0 mmol/L    Chloride 103 98 - 107 mmol/L    Carbon Dioxide (CO2) 29 22 - 31 mmol/L    Anion Gap 10 5 - 18 mmol/L    Urea Nitrogen 14 8 - 28 mg/dL    Creatinine 0.79 0.60 - 1.10 mg/dL    Calcium 10.1 8.5 - 10.5 mg/dL    Glucose 112 70 - 125 mg/dL    Alkaline Phosphatase 60 45 - 120 U/L    AST 22 0 - 40 U/L    ALT 29 0 - 45 U/L    Protein Total 6.8 6.0 - 8.0 g/dL    Albumin 4.3 3.5 - 5.0 g/dL    Bilirubin Total 0.8 0.0 - 1.0 mg/dL    GFR Estimate 76 >60 mL/min/1.73m2   CBC with platelets     Status: Normal   Result Value Ref Range    WBC Count 5.8 4.0 - 11.0 10e3/uL    RBC Count 4.51 3.80 - 5.20 10e6/uL    Hemoglobin 13.7 11.7 - 15.7 g/dL    Hematocrit 42.0 35.0 - 47.0 %    MCV 93 78 - 100 fL    MCH 30.4 26.5 - 33.0 pg    MCHC 32.6 31.5 - 36.5 g/dL    RDW 12.6 10.0 - 15.0 %    Platelet Count 273 150 - 450 10e3/uL          ASSESSMENT / PLAN:   Imelda was seen today for wellness visit.    Diagnoses and all orders for this visit:    Routine adult health maintenance    Benign Essential Hypertension  -     Comprehensive metabolic panel (BMP + Alb, Alk Phos, ALT, AST, Total. Bili, TP); Future  -     CBC with platelets; Future  -     Comprehensive metabolic panel (BMP + Alb, Alk Phos, ALT, AST, Total. Bili, TP)  -     CBC with platelets    Mixed hyperlipidemia  -     Lipid panel reflex to direct LDL Fasting; Future  -     Lipid panel reflex to direct LDL Fasting    Impaired fasting glucose  -     Hemoglobin A1c; Future  -     Hemoglobin A1c    Incontinence of feces with fecal urgency    Mild persistent asthma with acute exacerbation  -     mometasone  "(ASMANEX, 60 METERED DOSES,) 220 MCG/INH inhaler; [MOMETASONE (ASMANEX TWISTHALER) 220 MCG/ ACTUATION (60) INHALER] INHALE 1 PUFF TWICE DAILY.  -     albuterol (PROAIR HFA/PROVENTIL HFA/VENTOLIN HFA) 108 (90 Base) MCG/ACT inhaler; Inhale 2 puffs into the lungs every 6 hours as needed    Other orders  -     REVIEW OF HEALTH MAINTENANCE PROTOCOL ORDERS  -     ASTHMA CONTROL TEST - HIM SCAN        Patient has been advised of split billing requirements and indicates understanding: Yes  COUNSELING:  Reviewed preventive health counseling, as reflected in patient instructions       Regular exercise       Healthy diet/nutrition       Bladder control       Fall risk prevention       Osteoporosis prevention/bone health       Colon cancer screening       Advanced Planning     Estimated body mass index is 39.35 kg/m  as calculated from the following:    Height as of this encounter: 1.695 m (5' 6.75\").    Weight as of this encounter: 113.1 kg (249 lb 6 oz).    Weight management plan: Discussed healthy diet and exercise guidelines    She reports that she has never smoked. She has never used smokeless tobacco.      Appropriate preventive services were discussed with this patient, including applicable screening as appropriate for cardiovascular disease, diabetes, osteopenia/osteoporosis, and glaucoma.  As appropriate for age/gender, discussed screening for colorectal cancer, prostate cancer, breast cancer, and cervical cancer. Checklist reviewing preventive services available has been given to the patient.    Reviewed patients plan of care and provided an AVS. The Basic Care Plan (routine screening as documented in Health Maintenance) for Imelda meets the Care Plan requirement. This Care Plan has been established and reviewed with the Patient.    Counseling Resources:  ATP IV Guidelines  Pooled Cohorts Equation Calculator  Breast Cancer Risk Calculator  Breast Cancer: Medication to Reduce Risk  FRAX Risk Assessment  ICSI Preventive " Guidelines  Dietary Guidelines for Americans, 2010  USDA's MyPlate  ASA Prophylaxis  Lung CA Screening    Michell Anguiano MD  [facility]    Identified Health Risks:    The patient was counseled and encouraged to consider modifying their diet and eating habits. She was provided with information on recommended healthy diet options.

## 2021-08-30 NOTE — LETTER
My Asthma Action Plan    Name: Imelda El   YOB: 1950  Date: 9/19/2021   My doctor: Michell Anguiano MD   My clinic: [facility]        My Control Medicine: Mometasone furoate (Asmanex Twisthaler) - 220 mcg 2x daily  My Rescue Medicine: Albuterol (Proair/Ventolin/Proventil HFA) 2-4 puffs EVERY 4 HOURS as needed. Use a spacer if recommended by your provider.   My Asthma Severity:   Mild Persistent  Know your asthma triggers: upper respiratory infections, pollens, mold, humidity and exercise or sports               GREEN ZONE   Good Control    I feel good    No cough or wheeze    Can work, sleep and play without asthma symptoms       Take your asthma control medicine every day.     1. If exercise triggers your asthma, take your rescue medication    15 minutes before exercise or sports, and    During exercise if you have asthma symptoms  2. Spacer to use with inhaler: If you have a spacer, make sure to use it with your inhaler             YELLOW ZONE Getting Worse  I have ANY of these:    I do not feel good    Cough or wheeze    Chest feels tight    Wake up at night   1. Keep taking your Green Zone medications  2. Start taking your rescue medicine:    every 20 minutes for up to 1 hour. Then every 4 hours for 24-48 hours.  3. If you stay in the Yellow Zone for more than 12-24 hours, contact your doctor.  4. If you do not return to the Green Zone in 12-24 hours or you get worse, start taking your oral steroid medicine if prescribed by your provider.           RED ZONE Medical Alert - Get Help  I have ANY of these:    I feel awful    Medicine is not helping    Breathing getting harder    Trouble walking or talking    Nose opens wide to breathe       1. Take your rescue medicine NOW  2. If your provider has prescribed an oral steroid medicine, start taking it NOW  3. Call your doctor NOW  4. If you are still in the Red Zone after 20 minutes and you have not reached your  doctor:    Take your rescue medicine again and    Call 911 or go to the emergency room right away    See your regular doctor within 2 weeks of an Emergency Room or Urgent Care visit for follow-up treatment.          Annual Reminders:  Meet with Asthma Educator,  Flu Shot in the Fall, consider Pneumonia Vaccination for patients with asthma (aged 19 and older).    Pharmacy: [place], [place] - [address]    Electronically signed by Michell Anguiano MD   Date: 09/19/21                      Asthma Triggers  How To Control Things That Make Your Asthma Worse    Triggers are things that make your asthma worse.  Look at the list below to help you find your triggers and what you can do about them.  You can help prevent asthma flare-ups by staying away from your triggers.      Trigger                                                          What you can do   Cigarette Smoke  Tobacco smoke can make asthma worse. Do not allow smoking in your home, car or around you.  Be sure no one smokes at a child s day care or school.  If you smoke, ask your health care provider for ways to help you quit.  Ask family members to quit too.  Ask your health care provider for a referral to Quit Plan to help you quit smoking, or call 5-348-202-PLAN.     Colds, Flu, Bronchitis  These are common triggers of asthma. Wash your hands often.  Don t touch your eyes, nose or mouth.  Get a flu shot every year.     Dust Mites  These are tiny bugs that live in cloth or carpet. They are too small to see. Wash sheets and blankets in hot water every week.   Encase pillows and mattress in dust mite proof covers.  Avoid having carpet if you can. If you have carpet, vacuum weekly.   Use a dust mask and HEPA vacuum.   Pollen and Outdoor Mold  Some people are allergic to trees, grass, or weed pollen, or molds. Try to keep your windows closed.  Limit time out doors when pollen count is high.   Ask you health  care provider about taking medicine during allergy season.     Animal Dander  Some people are allergic to skin flakes, urine or saliva from pets with fur or feathers. Keep pets with fur or feathers out of your home.    If you can t keep the pet outdoors, then keep the pet out of your bedroom.  Keep the bedroom door closed.  Keep pets off cloth furniture and away from stuffed toys.     Mice, Rats, and Cockroaches   Some people are allergic to the waste from these pests.   Cover food and garbage.  Clean up spills and food crumbs.  Store grease in the refrigerator.   Keep food out of the bedroom.   Indoor Mold  This can be a trigger if your home has high moisture. Fix leaking faucets, pipes, or other sources of water.   Clean moldy surfaces.  Dehumidify basement if it is damp and smelly.   Smoke, Strong Odors, and Sprays  These can reduce air quality. Stay away from strong odors and sprays, such as perfume, powder, hair spray, paints, smoke incense, paint, cleaning products, candles and new carpet.   Exercise or Sports  Some people with asthma have this trigger. Be active!  Ask your doctor about taking medicine before sports or exercise to prevent symptoms.    Warm up for 5-10 minutes before and after sports or exercise.     Other Triggers of Asthma  Cold air:  Cover your nose and mouth with a scarf.  Sometimes laughing or crying can be a trigger.  Some medicines and food can trigger asthma.

## 2021-08-31 ASSESSMENT — ASTHMA QUESTIONNAIRES: ACT_TOTALSCORE: 25

## 2021-09-19 RX ORDER — ALBUTEROL SULFATE 90 UG/1
2 AEROSOL, METERED RESPIRATORY (INHALATION) EVERY 6 HOURS PRN
Qty: 18 G | Refills: 5 | Status: SHIPPED | OUTPATIENT
Start: 2021-09-19 | End: 2022-09-14

## 2021-09-20 NOTE — PATIENT INSTRUCTIONS
Patient Education   Personalized Prevention Plan  You are due for the preventive services outlined below.  Your care team is available to assist you in scheduling these services.  If you have already completed any of these items, please share that information with your care team to update in your medical record.  Health Maintenance Due   Topic Date Due     Asthma Action Plan - yearly  Never done     Osteoporosis Screening  08/25/2020     Flu Vaccine (1) 09/01/2021     Pneumococcal Vaccine (2 of 2 - PPSV23) 08/11/2021       Understanding USDA MyPlate  The USDA has guidelines to help you make healthy food choices. These are called MyPlate. MyPlate shows the food groups that make up healthy meals using the image of a place setting. Before you eat, think about the healthiest choices for what to put on your plate or in your cup or bowl. To learn more about building a healthy plate, visit www.choosemyplate.gov.    The food groups    Fruits. Any fruit or 100% fruit juice counts as part of the Fruit Group. Fruits may be fresh, canned, frozen, or dried, and may be whole, cut-up, or pureed. Make 1/2 of your plate fruits and vegetables.    Vegetables. Any vegetable or 100% vegetable juice counts as a member of the Vegetable Group. Vegetables may be fresh, frozen, canned, or dried. They can be served raw or cooked and may be whole, cut-up, or mashed. Make 1/2 of your plate fruits and vegetables.    Grains. All foods made from grains are part of the Grains Group. These include wheat, rice, oats, cornmeal, and barley. Grains are often used to make foods such as bread, pasta, oatmeal, cereal, tortillas, and grits. Grains should be no more than 1/4 of your plate. At least half of your grains should be whole grains.    Protein. This group includes meat, poultry, seafood, beans and peas, eggs, processed soy products (such as tofu), nuts (including nut butters), and seeds. Make protein choices no more than 1/4 of your plate. Meat  and poultry choices should be lean or low fat.    Dairy. The Dairy Group includes all fluid milk products and foods made from milk that contain calcium, such as yogurt and cheese. (Foods that have little calcium, such as cream, butter, and cream cheese, are not part of this group.) Most dairy choices should be low-fat or fat-free.    Oils. Oils aren't a food group, but they do contain essential nutrients. However it's important to watch your intake of oils. These are fats that are liquid at room temperature. They include canola, corn, olive, soybean, vegetable, and sunflower oil. Foods that are mainly oil include mayonnaise, certain salad dressings, and soft margarines. You likely already get your daily oil allowance from the foods you eat.  Things to limit  Eating healthy also means limiting these things in your diet:       Salt (sodium). Many processed foods have a lot of sodium. To keep sodium intake down, eat fresh vegetables, meats, poultry, and seafood when possible. Purchase low-sodium, reduced-sodium, or no-salt-added food products at the store. And don't add salt to your meals at home. Instead, season them with herbs and spices such as dill, oregano, cumin, and paprika. Or try adding flavor with lemon or lime zest and juice.    Saturated fat. Saturated fats are most often found in animal products such as beef, pork, and chicken. They are often solid at room temperature, such as butter. To reduce your saturated fat intake, choose leaner cuts of meat and poultry. And try healthier cooking methods such as grilling, broiling, roasting, or baking. For a simple lower-fat swap, use plain nonfat yogurt instead of mayonnaise when making potato salad or macaroni salad.    Added sugars. These are sugars added to foods. They are in foods such as ice cream, candy, soda, fruit drinks, sports drinks, energy drinks, cookies, pastries, jams, and syrups. Cut down on added sugars by sharing sweet treats with a family member  or friend. You can also choose fruit for dessert, and drink water or other unsweetened beverages.     [employer] last reviewed this educational content on 6/1/2020 2000-2021 [employer]. All rights reserved. This information is not intended as a substitute for professional medical care. Always follow your healthcare professional's instructions.

## 2021-10-17 ENCOUNTER — HEALTH MAINTENANCE LETTER (OUTPATIENT)
Age: 71
End: 2021-10-17

## 2021-11-01 DIAGNOSIS — E78.5 HYPERLIPIDEMIA, UNSPECIFIED HYPERLIPIDEMIA TYPE: ICD-10-CM

## 2021-11-01 DIAGNOSIS — I10 ESSENTIAL HYPERTENSION, BENIGN: ICD-10-CM

## 2021-11-02 RX ORDER — LISINOPRIL AND HYDROCHLOROTHIAZIDE 20; 25 MG/1; MG/1
1 TABLET ORAL DAILY
Qty: 90 TABLET | Refills: 3 | Status: SHIPPED | OUTPATIENT
Start: 2021-11-02 | End: 2022-09-14

## 2021-11-02 RX ORDER — SIMVASTATIN 40 MG
TABLET ORAL
Qty: 90 TABLET | Refills: 3 | Status: SHIPPED | OUTPATIENT
Start: 2021-11-02 | End: 2022-09-14

## 2021-11-02 NOTE — TELEPHONE ENCOUNTER
"Last Written Prescription Date:  2/3/21  Last Fill Quantity: 90,  # refills: 2   Last office visit provider:  8/30/21     Requested Prescriptions   Pending Prescriptions Disp Refills     simvastatin (ZOCOR) 40 MG tablet [Pharmacy Med Name: SIMVASTATIN 40MG TABS] 90 tablet 2     Sig: TAKE ONE TABLET BY MOUTH EVERY DAY AT BEDTIME       Statins Protocol Passed - 11/1/2021  7:47 AM        Passed - LDL on file in past 12 months     Recent Labs   Lab Test 08/30/21  1155   LDL 84             Passed - No abnormal creatine kinase in past 12 months     No lab results found.             Passed - Recent (12 mo) or future (30 days) visit within the authorizing provider's specialty     Patient has had an office visit with the authorizing provider or a provider within the authorizing providers department within the previous 12 mos or has a future within next 30 days. See \"Patient Info\" tab in inbasket, or \"Choose Columns\" in Meds & Orders section of the refill encounter.              Passed - Medication is active on med list        Passed - Patient is age 18 or older        Passed - No active pregnancy on record        Passed - No positive pregnancy test in past 12 months           lisinopril-hydrochlorothiazide (ZESTORETIC) 20-25 MG tablet [Pharmacy Med Name: LISINOPRIL/HCTZ 20-25MG TABS] 90 tablet 2     Sig: TAKE ONE TABLET BY MOUTH EVERY DAY       Diuretics (Including Combos) Protocol Passed - 11/1/2021  7:47 AM        Passed - Blood pressure under 140/90 in past 12 months     BP Readings from Last 3 Encounters:   08/30/21 130/70   09/28/20 138/72   02/24/20 124/64                 Passed - Recent (12 mo) or future (30 days) visit within the authorizing provider's specialty     Patient has had an office visit with the authorizing provider or a provider within the authorizing providers department within the previous 12 mos or has a future within next 30 days. See \"Patient Info\" tab in inbasket, or \"Choose Columns\" in Meds & " "Orders section of the refill encounter.              Passed - Medication is active on med list        Passed - Patient is age 18 or older        Passed - No active pregancy on record        Passed - Normal serum creatinine on file in past 12 months     Recent Labs   Lab Test 08/30/21  1155   CR 0.79              Passed - Normal serum potassium on file in past 12 months     Recent Labs   Lab Test 08/30/21  1155   POTASSIUM 4.2                    Passed - Normal serum sodium on file in past 12 months     Recent Labs   Lab Test 08/30/21  1155                 Passed - No positive pregnancy test in past 12 months       ACE Inhibitors (Including Combos) Protocol Passed - 11/1/2021  7:47 AM        Passed - Blood pressure under 140/90 in past 12 months     BP Readings from Last 3 Encounters:   08/30/21 130/70   09/28/20 138/72   02/24/20 124/64                 Passed - Recent (12 mo) or future (30 days) visit within the authorizing provider's specialty     Patient has had an office visit with the authorizing provider or a provider within the authorizing providers department within the previous 12 mos or has a future within next 30 days. See \"Patient Info\" tab in inbasket, or \"Choose Columns\" in Meds & Orders section of the refill encounter.              Passed - Medication is active on med list        Passed - Patient is age 18 or older        Passed - No active pregnancy on record        Passed - Normal serum creatinine on file in past 12 months     Recent Labs   Lab Test 08/30/21  1155   CR 0.79       Ok to refill medication if creatinine is low          Passed - Normal serum potassium on file in past 12 months     Recent Labs   Lab Test 08/30/21  1155   POTASSIUM 4.2             Passed - No positive pregnancy test within past 12 months             Giovanni Melendez RN 11/02/21 9:01 AM  "

## 2022-05-01 ENCOUNTER — VIRTUAL VISIT (OUTPATIENT)
Dept: URGENT CARE | Facility: CLINIC | Age: 72
End: 2022-05-01
Payer: COMMERCIAL

## 2022-05-01 DIAGNOSIS — J45.909 UNCOMPLICATED ASTHMA, UNSPECIFIED ASTHMA SEVERITY, UNSPECIFIED WHETHER PERSISTENT: ICD-10-CM

## 2022-05-01 DIAGNOSIS — U07.1 INFECTION DUE TO 2019 NOVEL CORONAVIRUS: Primary | ICD-10-CM

## 2022-05-01 PROCEDURE — 99214 OFFICE O/P EST MOD 30 MIN: CPT | Mod: 95 | Performed by: PHYSICIAN ASSISTANT

## 2022-05-01 NOTE — PROGRESS NOTES
"Imelda is a 71 year old who is being evaluated via a billable telephone visit.      Assessment & Plan     Infection due to 2019 novel coronavirus    - nirmatrelvir and ritonavir (PAXLOVID) therapy pack; Take 3 tablets by mouth 2 times daily    Uncomplicated asthma, unspecified asthma severity, unspecified whether persistent    - nirmatrelvir and ritonavir (PAXLOVID) therapy pack; Take 3 tablets by mouth 2 times daily      BMI:   Estimated body mass index is 39.35 kg/m  as calculated from the following:    Height as of 8/30/21: 1.695 m (5' 6.75\").    Weight as of 8/30/21: 113.1 kg (249 lb 6 oz).     COVID-19 positive patient.  Encounter for consideration of medication intervention. Patient does qualify for a prescription. Full discussion with patient including medication options, risks and benefits. Potential drug interactions reviewed with patient.     Treatment Planned Paxlovid sent to patients [facility] pharmacy    Temporary change to home medications: Hold Atorvastatin and Flonase    Estimated body mass index is 39.35 kg/m  as calculated from the following:    Height as of 8/30/21: 1.695 m (5' 6.75\").    Weight as of 8/30/21: 113.1 kg (249 lb 6 oz).  GFR Estimate   Date Value Ref Range Status   08/30/2021 76 >60 mL/min/1.73m2 Final     Comment:     As of July 11, 2021, eGFR is calculated by the CKD-EPI creatinine equation, without race adjustment. eGFR can be influenced by muscle mass, exercise, and diet. The reported eGFR is an estimation only and is only applicable if the renal function is stable.   09/28/2020 >60 >60 mL/min/1.73m2 Final     No results found for: [id number]      Virtual Urgent Care  [facility] VIRTUAL URGENT CARE    Subjective   Imelda is a 71 year old who presents for the following health issues :covid    HPI      COVID-19 Symptom Review  How many days ago did these symptoms start? 04/28 2 days ago     Are any of the following symptoms significant for you?  New or worsening difficulty " breathing? Yes    Please describe what kind of difficulty you are having breathing:Mild dyspnea (able to do ADLs without difficulty, mild shortness of breath with activities such as climbing one or two flights of stairs or walking briskly)    Worsening cough? Yes, it's a dry cough.     Fever or chills? Yes, I 3felt feverish or had chills.    Headache: YES    Sore throat: YES    Chest pain: no    Diarrhea: no    Body aches? YES    What treatments has patient tried? inhaler   Does patient live in a nursing home, group home, or shelter? no  Does patient have a way to get food/medications during quarantined? Yes, I have a friend or family member who can help me.          Review of Systems   Constitutional, HEENT, cardiovascular, pulmonary, gi and gu systems are negative, except as otherwise noted.      Objective         Vitals:  No vitals were obtained today due to virtual visit.    Physical Exam   alert and no distress  PSYCH: Alert and oriented times 3; coherent speech, normal   rate and volume, able to articulate logical thoughts, able   to abstract reason, no tangential thoughts, no hallucinations   or delusions  Her affect is normal  RESP: No cough, no audible wheezing, able to talk in full sentences  Remainder of exam unable to be completed due to telephone visits        Phone call duration: 13 minutes

## 2022-05-01 NOTE — PATIENT INSTRUCTIONS
Hold Simvastatin and Flonase while on the Paxlovid.    FACT SHEET FOR PATIENTS, PARENTS, AND CAREGIVERS  EMERGENCY USE AUTHORIZATION (EUA) OF PAXLOVID FOR CORONAVIRUS DISEASE 2019 (COVID-19)    You are being given this Fact Sheet because your healthcare provider believes it is necessary to provide you with PAXLOVID for the treatment of mild-to-moderate coronavirus disease (COVID-19) caused by the SARS-CoV-2 virus. This Fact Sheet contains information to help you understand the risks and benefits of taking the PAXLOVID you have received or may receive.  The [place]S. Food and Drug Administration (FDA) has issued an Emergency Use Authorization (EUA) to make PAXLOVID available during the COVID-19 pandemic (for more details about an EUA please see  What is an Emergency Use Authorization?  at the end of this document). PAXLOVID is not an FDA-approved medicine in the [place]. Read this Fact Sheet for information about PAXLOVID. Talk to your healthcare provider about your options or if you have any questions. It is your choice to take PAXLOVID.  What is COVID-19?  COVID-19 is caused by a virus called a coronavirus. You can get COVID-19 through close contact with another person who has the virus.  COVID-19 illnesses have ranged from very mild-to-severe, including illness resulting in death. While information so far suggests that most COVID-19 illness is mild, serious illness can happen and may cause some of your other medical conditions to become worse. Older people and people of all ages with severe, long lasting (chronic) medical conditions like heart disease, lung disease, and diabetes, for example seem to be at higher risk of being hospitalized for COVID-19.  What is PAXLOVID?  PAXLOVID is an investigational medicine used to treat mild-to-moderate COVID-19 in adults and children [12 years of age and older weighing at least 88 pounds (40 kg)] with positive results of direct SARS-CoV-2 viral testing, and who are at high  risk for progression to severe COVID-19, including hospitalization or death. PAXLOVID is investigational because it is still being studied. There is limited information about the safety and effectiveness of using PAXLOVID to treat people with mild-to-moderate COVID-19.  The FDA has authorized the emergency use of PAXLOVID for the treatment of mild-to- moderate COVID-19 in adults and children [12 years of age and older weighing at least 88 pounds (40 kg)] with a positive test for the virus that causes COVID-19, and who are at high risk for progression to severe COVID-19, including hospitalization or death, under an EUA.  1 Revised: 18 March 2022    What should I tell my healthcare provider before I take PAXLOVID?  Tell your healthcare provider if you:  ? Have any allergies  ? Have liver or kidney disease  ? Are pregnant or plan to become pregnant  ? Are breastfeeding a child  ? Have any serious illnesses  Tell your healthcare provider about all the medicines you take, including prescription and over-the-counter medicines, vitamins, and herbal supplements.  Some medicines may interact with PAXLOVID and may cause serious side effects. Keep a list of your medicines to show your healthcare provider and pharmacist when you get a new medicine.  You can ask your healthcare provider or pharmacist for a list of medicines that interact with PAXLOVID. Do not start taking a new medicine without telling your healthcare provider. Your healthcare provider can tell you if it is safe to take PAXLOVID with other medicines.  Tell your healthcare provider if you are taking combined hormonal contraceptive.  PAXLOVID may affect how your birth control pills work. Females who are able to become pregnant should use another effective alternative form of contraception or an additional barrier method of contraception. Talk to your healthcare provider if you have any questions about contraceptive methods that might be right for you.  How do I  take PAXLOVID?  ? PAXLOVID consists of 2 medicines: nirmatrelvir and ritonavir.  o Take 2 pink tablets of nirmatrelvir with 1 white tablet of ritonavir by mouth  2 times each day (in the morning and in the evening) for 5 days. For each  dose, take all 3 tablets at the same time.  o If you have kidney disease, talk to your healthcare provider. You  may need a different dose.  ? Swallow the tablets whole. Do not chew, break, or crush the tablets.  ? Take PAXLOVID with or without food.  ? Do not stop taking PAXLOVID without talking to your healthcare provider, even if  you feel better.  ? If you miss a dose of PAXLOVID within 8 hours of the time it is usually taken, take it as soon as you remember. If you miss a dose by more than 8 hours, skip the missed dose and take the next dose at your regular time. Do not take  2 doses of PAXLOVID at the same time.  ? If you take too much PAXLOVID, call your healthcare provider or go to the nearest hospital emergency room right away.  ? If you are taking a ritonavir- or cobicistat-containing medicine to treat hepatitis C or Human Immunodeficiency Virus (HIV), you should continue to take your medicine as prescribed by your healthcare provider.  2 Revised: 18 March 2022    Talk to your healthcare provider if you do not feel better or if you feel worse after 5 days.  Who should generally not take PAXLOVID?  Do not take PAXLOVID if:  ? You are allergic to nirmatrelvir, ritonavir, or any of the ingredients in PAXLOVID.  ? You are taking any of the following medicines:  o Alfuzosin  o Pethidine, propoxyphene  o Ranolazine  o Amiodarone, dronedarone, flecainide, propafenone, quinidine o Colchicine  o Lurasidone, pimozide, clozapine  o Dihydroergotamine, ergotamine, methylergonovine  o Lovastatin, simvastatin  o Sildenafil (Revatio ) for pulmonary arterial hypertension (PAH) o Triazolam, oral midazolam  o Apalutamide  o Carbamazepine, phenobarbital, phenytoin  o Rifampin  o [facility]  Wort (hypericum perforatum)  Taking PAXLOVID with these medicines may cause serious or life-threatening side effects or affect how PAXLOVID works.  These are not the only medicines that may cause serious side effects if taken with PAXLOVID. PAXLOVID may increase or decrease the levels of multiple other medicines. It is very important to tell your healthcare provider about all of the medicines you are taking because additional laboratory tests or changes in the dose of your other medicines may be necessary while you are taking PAXLOVID. Your healthcare provider may also tell you about specific symptoms to watch out for that may indicate that you need to stop or decrease the dose of some of your other medicines.  What are the important possible side effects of PAXLOVID?  Possible side effects of PAXLOVID are:  ? Allergic Reactions. Allergic reactions can happen in people taking  PAXLOVID, even after only 1 dose. Stop taking PAXLOVID and call your healthcare provider right away if you get any of the following symptoms of an allergic reaction:  o hives  o trouble swallowing or breathing  o swelling of the mouth, lips, or face o throat tightness  o hoarseness  3 Revised: 18 March 2022    o skin rash  ? Liver Problems. Tell your healthcare provider right away if you have any of  these signs and symptoms of liver problems: loss of appetite, yellowing of your skin and the whites of eyes (jaundice), dark-colored urine, pale colored stools and itchy skin, stomach area (abdominal) pain.  ? Resistance to HIV Medicines. If you have untreated HIV infection, PAXLOVID may lead to some HIV medicines not working as well in the future.  ? Other possible side effects include:  o altered sense of taste  o diarrhea  o high blood pressure o muscle aches  These are not all the possible side effects of PAXLOVID. Not many people have taken PAXLOVID. Serious and unexpected side effects may happen. PAXLOVID is still being studied, so it is  possible that all of the risks are not known at this time.  What other treatment choices are there?  Veklury (remdesivir) is FDA-approved for the treatment of mild-to-moderate COVID-19 in certain adults and children. Talk with your doctor to see if Veklury is appropriate for you.  Like PAXLOVID, FDA may also allow for the emergency use of other medicines to treat people with COVID-19. Go to https://www.fda.gov/emergency-preparedness-and- response/mcm-legal-regulatory-and-policy-framework/emergency-use-authorization for information on the emergency use of other medicines that are authorized by FDA to treat people with COVID-19. Your healthcare provider may talk with you about clinical trials for which you may be eligible.  It is your choice to be treated or not to be treated with PAXLOVID. Should you decide not to receive it or for your child not to receive it, it will not change your standard medical care.  What if I am pregnant or breastfeeding?  There is no experience treating pregnant women or breastfeeding mothers with PAXLOVID. For a mother and unborn baby, the benefit of taking PAXLOVID may be greater than the risk from the treatment. If you are pregnant, discuss your options and specific situation with your healthcare provider.  It is recommended that you use effective barrier contraception or do not have sexual activity while taking PAXLOVID.  If you are breastfeeding, discuss your options and specific situation with your healthcare provider.  4 Revised: 18 March 2022    How do I report side effects with PAXLOVID?  Contact your healthcare provider if you have any side effects that bother you or do not go away.  Report side effects to FDA MedWatch at www.fda.gov/medwatch or call 5-330-GST- 9980 or you can report side effects to [employer] at the contact information provided below.  Telephone number  www.ixigo [phone] How should I store PAXLOVID?  Store PAXLOVID  tablets at room temperature, between 68?F to 77?F (20?C to 25?C).  How can I learn more about COVID-19?  ? Ask your healthcare provider.  ? Visit https://www.cdc.gov/COVID19.  ? Contact your local or state public health department.  What is an Emergency Use Authorization (EUA)?  The [place] FDA has made PAXLOVID available under an emergency access mechanism called an Emergency Use Authorization (EUA). The EUA is supported by a  of Health and Human Service ([facility]) declaration that circumstances exist to justify the emergency use of drugs and biological products during the COVID-19 pandemic.  PAXLOVID for the treatment of mild-to-moderate COVID-19 in adults and children  [12 years of age and older weighing at least 88 pounds (40 kg)] with positive results of direct SARS-CoV-2 viral testing, and who are at high risk for progression to severe COVID-19, including hospitalization or death, has not undergone the same type of review as an FDA-approved product. In issuing an EUA under the COVID-19 public health emergency, the FDA has determined, among other things, that based on the total amount of scientific evidence available including data from adequate and well-controlled clinical trials, if available, it is reasonable to believe that the product may be effective for diagnosing, treating, or preventing COVID-19, or a serious or life-threatening disease or condition caused by COVID-19; that the known and potential benefits of the product, when used to diagnose, treat, or prevent such disease or condition, outweigh the known and potential risks of such product; and that there are no adequate, approved, and available alternatives.  All of these criteria must be met to allow for the product to be used in the treatment of patients during the COVID-19 pandemic. The EUA for PAXLOVID is in effect for the duration of the COVID-19 declaration justifying emergency use of this product, unless terminated or revoked  (after which the products may no longer be used under the EUA).    Website  Fax number         5 Revised: 18 March 2022    Additional Information  For general questions, visit the website or call the telephone number provided below.  Website Telephone number  You can also go to www.Wiki-PR or call [phone] for more information.  [id number]  Revised: 18 March 2022      [email or web address]     [phone] (1-414-P47-PACK)      6 Revised: 18 March 2022

## 2022-08-23 ENCOUNTER — HOSPITAL ENCOUNTER (OUTPATIENT)
Dept: MAMMOGRAPHY | Facility: CLINIC | Age: 72
Discharge: HOME | End: 2022-08-23
Attending: FAMILY MEDICINE | Admitting: FAMILY MEDICINE
Payer: COMMERCIAL

## 2022-08-23 DIAGNOSIS — Z12.31 VISIT FOR SCREENING MAMMOGRAM: ICD-10-CM

## 2022-08-23 PROCEDURE — 77067 SCR MAMMO BI INCL CAD: CPT

## 2022-09-11 ASSESSMENT — ENCOUNTER SYMPTOMS
PALPITATIONS: 0
MYALGIAS: 1
HEMATOCHEZIA: 0
DYSURIA: 0
NERVOUS/ANXIOUS: 0
HEADACHES: 0
FREQUENCY: 0
COUGH: 0
JOINT SWELLING: 1
ABDOMINAL PAIN: 0
DIZZINESS: 0
CONSTIPATION: 0
PARESTHESIAS: 0
SHORTNESS OF BREATH: 0
SORE THROAT: 0
FEVER: 0
DIARRHEA: 0
BREAST MASS: 0
HEARTBURN: 0
CHILLS: 0
HEMATURIA: 0
WEAKNESS: 0
EYE PAIN: 0
NAUSEA: 0
ARTHRALGIAS: 1

## 2022-09-11 ASSESSMENT — ASTHMA QUESTIONNAIRES
QUESTION_1 LAST FOUR WEEKS HOW MUCH OF THE TIME DID YOUR ASTHMA KEEP YOU FROM GETTING AS MUCH DONE AT WORK, SCHOOL OR AT HOME: NONE OF THE TIME
QUESTION_5 LAST FOUR WEEKS HOW WOULD YOU RATE YOUR ASTHMA CONTROL: COMPLETELY CONTROLLED
ACT_TOTALSCORE: 24
ACT_TOTALSCORE: 24
QUESTION_2 LAST FOUR WEEKS HOW OFTEN HAVE YOU HAD SHORTNESS OF BREATH: NOT AT ALL
QUESTION_4 LAST FOUR WEEKS HOW OFTEN HAVE YOU USED YOUR RESCUE INHALER OR NEBULIZER MEDICATION (SUCH AS ALBUTEROL): ONCE A WEEK OR LESS
QUESTION_3 LAST FOUR WEEKS HOW OFTEN DID YOUR ASTHMA SYMPTOMS (WHEEZING, COUGHING, SHORTNESS OF BREATH, CHEST TIGHTNESS OR PAIN) WAKE YOU UP AT NIGHT OR EARLIER THAN USUAL IN THE MORNING: NOT AT ALL

## 2022-09-11 ASSESSMENT — ACTIVITIES OF DAILY LIVING (ADL): CURRENT_FUNCTION: NO ASSISTANCE NEEDED

## 2022-09-14 ENCOUNTER — OFFICE VISIT (OUTPATIENT)
Dept: FAMILY MEDICINE | Facility: CLINIC | Age: 72
End: 2022-09-14
Payer: COMMERCIAL

## 2022-09-14 VITALS
HEIGHT: 67 IN | BODY MASS INDEX: 39.35 KG/M2 | SYSTOLIC BLOOD PRESSURE: 122 MMHG | HEART RATE: 80 BPM | WEIGHT: 250.7 LBS | DIASTOLIC BLOOD PRESSURE: 70 MMHG | OXYGEN SATURATION: 98 % | TEMPERATURE: 98.8 F

## 2022-09-14 DIAGNOSIS — Z78.0 MENOPAUSE: ICD-10-CM

## 2022-09-14 DIAGNOSIS — E66.01 MORBID OBESITY (H): ICD-10-CM

## 2022-09-14 DIAGNOSIS — J45.31 MILD PERSISTENT ASTHMA WITH ACUTE EXACERBATION: ICD-10-CM

## 2022-09-14 DIAGNOSIS — Z00.00 ENCOUNTER FOR MEDICARE ANNUAL WELLNESS EXAM: Primary | ICD-10-CM

## 2022-09-14 DIAGNOSIS — E78.5 HYPERLIPIDEMIA, UNSPECIFIED HYPERLIPIDEMIA TYPE: ICD-10-CM

## 2022-09-14 DIAGNOSIS — Z91.09 ENVIRONMENTAL ALLERGIES: ICD-10-CM

## 2022-09-14 DIAGNOSIS — I10 ESSENTIAL HYPERTENSION, BENIGN: ICD-10-CM

## 2022-09-14 PROCEDURE — 80061 LIPID PANEL: CPT | Performed by: FAMILY MEDICINE

## 2022-09-14 PROCEDURE — 36415 COLL VENOUS BLD VENIPUNCTURE: CPT | Performed by: FAMILY MEDICINE

## 2022-09-14 PROCEDURE — G0439 PPPS, SUBSEQ VISIT: HCPCS | Performed by: FAMILY MEDICINE

## 2022-09-14 PROCEDURE — 99214 OFFICE O/P EST MOD 30 MIN: CPT | Mod: 25 | Performed by: FAMILY MEDICINE

## 2022-09-14 PROCEDURE — 80053 COMPREHEN METABOLIC PANEL: CPT | Performed by: FAMILY MEDICINE

## 2022-09-14 RX ORDER — ALBUTEROL SULFATE 90 UG/1
2 AEROSOL, METERED RESPIRATORY (INHALATION) EVERY 6 HOURS PRN
Qty: 18 G | Refills: 5 | Status: SHIPPED | OUTPATIENT
Start: 2022-09-14 | End: 2023-11-02

## 2022-09-14 RX ORDER — SIMVASTATIN 40 MG
40 TABLET ORAL AT BEDTIME
Qty: 90 TABLET | Refills: 3 | Status: SHIPPED | OUTPATIENT
Start: 2022-09-14 | End: 2023-11-02

## 2022-09-14 RX ORDER — LISINOPRIL AND HYDROCHLOROTHIAZIDE 20; 25 MG/1; MG/1
1 TABLET ORAL DAILY
Qty: 90 TABLET | Refills: 3 | Status: SHIPPED | OUTPATIENT
Start: 2022-09-14 | End: 2023-11-02

## 2022-09-14 RX ORDER — FLUTICASONE PROPIONATE 50 MCG
2 SPRAY, SUSPENSION (ML) NASAL DAILY
Qty: 15.8 ML | Refills: 11 | Status: SHIPPED | OUTPATIENT
Start: 2022-09-14 | End: 2023-11-02

## 2022-09-14 ASSESSMENT — ENCOUNTER SYMPTOMS
DIARRHEA: 0
WEAKNESS: 0
PARESTHESIAS: 0
SORE THROAT: 0
DIZZINESS: 0
HEMATURIA: 0
HEADACHES: 0
PALPITATIONS: 0
COUGH: 0
SHORTNESS OF BREATH: 0
FEVER: 0
FREQUENCY: 0
NERVOUS/ANXIOUS: 0
JOINT SWELLING: 1
HEARTBURN: 0
CHILLS: 0
ABDOMINAL PAIN: 0
EYE PAIN: 0
ARTHRALGIAS: 1
HEMATOCHEZIA: 0
BREAST MASS: 0
MYALGIAS: 1
NAUSEA: 0
DYSURIA: 0
CONSTIPATION: 0

## 2022-09-14 ASSESSMENT — ACTIVITIES OF DAILY LIVING (ADL): CURRENT_FUNCTION: NO ASSISTANCE NEEDED

## 2022-09-14 ASSESSMENT — PAIN SCALES - GENERAL: PAINLEVEL: NO PAIN (0)

## 2022-09-14 NOTE — PATIENT INSTRUCTIONS
Patient Education   Personalized Prevention Plan  You are due for the preventive services outlined below.  Your care team is available to assist you in scheduling these services.  If you have already completed any of these items, please share that information with your care team to update in your medical record.  Health Maintenance Due   Topic Date Due     Pneumococcal Vaccine (2 - PPSV23 or PCV20) 06/25/2020     Osteoporosis Screening  08/25/2020     COVID-19 Vaccine (4 - Booster for Pfizer series) 02/01/2022     ANNUAL REVIEW OF HM ORDERS  08/30/2022     Asthma Action Plan - yearly  08/30/2022       Exercise for a Healthier Heart  You may wonder how you can improve the health of your heart. If you re thinking about exercise, you re on the right track. You don t need to become an athlete. But you do need a certain amount of brisk exercise to help strengthen your heart. If you have been diagnosed with a heart condition, your healthcare provider may advise exercise to help stabilize your condition. To help make exercise a habit, choose safe, fun activities.      Exercise with a friend. When activity is fun, you're more likely to stick with it.   Before you start  Check with your healthcare provider before starting an exercise program. This is especially important if you have not been active for a while. It's also important if you have a long-term (chronic) health problem such as heart disease, diabetes, or obesity. Or if you are at high risk for having these problems.   Why exercise?  Exercising regularly offers many healthy rewards. It can help you do all of the following:     Improve your blood cholesterol level to help prevent further heart trouble    Lower your blood pressure to help prevent a stroke or heart attack    Control diabetes, or reduce your risk of getting this disease    Improve your heart and lung function    Reach and stay at a healthy weight    Make your muscles stronger so you can stay  active    Prevent falls and fractures by slowing the loss of bone mass (osteoporosis)    Manage stress better    Reduce your blood pressure    Improve your sense of self and your body image  Exercise tips      Ease into your routine. Set small goals. Then build on them. If you are not sure what your activity level should be, talk with your healthcare provider first before starting an exercise routine.    Exercise on most days. Aim for a total of 150 minutes (2 hours and 30 minutes) or more of moderate-intensity aerobic activity each week. Or 75 minutes (1 hour and 15 minutes) or more of vigorous-intensity aerobic activity each week. Or try for a combination of both. Moderate activity means that you breathe heavier and your heart rate increases but you can still talk. Think about doing 40 minutes of moderate exercise, 3 to 4 times a week. For best results, activity should last for about 40 minutes to lower blood pressure and cholesterol. It's OK to work up to the 40-minute period over time. Examples of moderate-intensity activity are walking 1 mile in 15 minutes. Or doing 30 to 45 minutes of yard work.    Step up your daily activity level.  Along with your exercise program, try being more active the whole day. Walk instead of drive. Or park further away so that you take more steps each day. Do more household tasks or yard work. You may not be able to meet the advised mount of physical activity. But doing some moderate- or vigorous-intensity aerobic activity can help reduce your risk for heart disease. Your healthcare provider can help you figure out what is best for you.    Choose 1 or more activities you enjoy.  Walking is one of the easiest things you can do. You can also try swimming, riding a bike, dancing, or taking an exercise class.    When to call your healthcare provider  Call your healthcare provider if you have any of these:     Chest pain or feel dizzy or lightheaded    Burning, tightness, pressure, or  heaviness in your chest, neck, shoulders, back, or arms    Abnormal shortness of breath    More joint or muscle pain    A very fast or irregular heartbeat (palpitations)  [employer] last reviewed this educational content on 7/1/2019 2000-2021 [employer]. All rights reserved. This information is not intended as a substitute for professional medical care. Always follow your healthcare professional's instructions.

## 2022-09-14 NOTE — PROGRESS NOTES
"SUBJECTIVE:   Imleda is a 71 year old who presents for Preventive Visit.      Patient has been advised of split billing requirements and indicates understanding: Yes  Are you in the first 12 months of your Medicare coverage?  No    Healthy Habits:     In general, how would you rate your overall health?  Good    Frequency of exercise:  None    Do you usually eat at least 4 servings of fruit and vegetables a day, include whole grains    & fiber and avoid regularly eating high fat or \"junk\" foods?  Yes    Taking medications regularly:  Yes    Ability to successfully perform activities of daily living:  No assistance needed    Home Safety:  No safety concerns identified    Hearing Impairment:  No hearing concerns    In the past 6 months, have you been bothered by leaking of urine?  No    In general, how would you rate your overall mental or emotional health?  Excellent      PHQ-2 Total Score: 0    Additional concerns today:  Yes    Do you feel safe in your environment? Yes    Have you ever done Advance Care Planning? (For example, a Health Directive, POLST, or a discussion with a medical provider or your loved ones about your wishes): No, advance care planning information given to patient to review.  Advanced care planning was discussed at today's visit.       Fall risk  Fallen 2 or more times in the past year?: No  Any fall with injury in the past year?: No    Cognitive Screening   1) Repeat 3 items (Leader, Season, Table)    2) Clock draw: NORMAL  3) 3 item recall:   Recalls 3 objects  Results: 3 items recalled: COGNITIVE IMPAIRMENT LESS LIKELY    Mini-CogTM Copyright S Marilynn. Licensed by the author for use in [facility]; reprinted with permission (sheyla@.[facility]). All rights reserved.      Do you have sleep apnea, excessive snoring or daytime drowsiness?: no    Reviewed and updated as needed this visit by clinical staff   Tobacco  Allergies  Meds  Problems  Med Hx  Surg Hx  Fam Hx        "     Reviewed and updated as needed this visit by Provider   Tobacco  Allergies  Meds  Problems  Med Hx  Surg Hx  Fam Hx           Social History     Tobacco Use     Smoking status: Never Smoker     Smokeless tobacco: Never Used   Substance Use Topics     Alcohol use: Yes     Comment: Alcoholic Drinks/day: 0-1     If you drink alcohol do you typically have >3 drinks per day or >7 drinks per week? No    Alcohol Use 9/11/2022   Prescreen: >3 drinks/day or >7 drinks/week? No   Prescreen: >3 drinks/day or >7 drinks/week? -           Hyperlipidemia Follow-Up      Are you regularly taking any medication or supplement to lower your cholesterol?   Yes- zocor    Are you having muscle aches or other side effects that you think could be caused by your cholesterol lowering medication?  No    Hypertension Follow-up      Do you check your blood pressure regularly outside of the clinic? Yes     Are you following a low salt diet? Yes    Are your blood pressures ever more than 140 on the top number (systolic) OR more   than 90 on the bottom number (diastolic), for example 140/90? No    Asthma Follow-Up    Was ACT completed today?    Yes    ACT Total Scores 9/11/2022   ACT TOTAL SCORE (Goal Greater than or Equal to 20) 24   In the past 12 months, how many times did you visit the emergency room for your asthma without being admitted to the hospital? 0   In the past 12 months, how many times were you hospitalized overnight because of your asthma? 0          How many days per week do you miss taking your asthma controller medication?  0    Please describe any recent triggers for your asthma: upper respiratory infections and pollens    Have you had any Emergency Room Visits, Urgent Care Visits, or Hospital Admissions since your last office visit?  No      Current providers sharing in care for this patient include:   Patient Care Team:  Michell Anguiano MD as PCP - General (Family Practice)  Michell Anguiano MD as Assigned  PCP    The following health maintenance items are reviewed in Epic and correct as of today:  Health Maintenance   Topic Date Due     Pneumococcal Vaccine: 65+ Years (2 - PPSV23 or PCV20) 06/25/2020     DEXA  08/25/2020     COVID-19 Vaccine (4 - Booster for Pfizer series) 02/01/2022     ANNUAL REVIEW OF HM ORDERS  08/30/2022     MEDICARE ANNUAL WELLNESS VISIT  08/30/2022     ASTHMA ACTION PLAN  08/30/2022     ASTHMA CONTROL TEST  03/14/2023     FALL RISK ASSESSMENT  09/14/2023     MAMMO SCREENING  08/23/2024     LIPID  08/30/2026     ADVANCE CARE PLANNING  09/19/2026     DTAP/TDAP/TD IMMUNIZATION (3 - Td or Tdap) 08/24/2028     COLORECTAL CANCER SCREENING  07/23/2031     HEPATITIS C SCREENING  Completed     PHQ-2 (once per calendar year)  Completed     INFLUENZA VACCINE  Completed     ZOSTER IMMUNIZATION  Completed     IPV IMMUNIZATION  Aged Out     MENINGITIS IMMUNIZATION  Aged Out     HEPATITIS B IMMUNIZATION  Aged Out     Lab work is in process  Labs reviewed in EPIC  Pneumonia Vaccine:For adults 65 years or older who do not have an immunocompromising condition, cerebrospinal fluid leak, or cochlear implant and want to receive PCV13 AND PPSV23: Administer 1 dose of PCV13 first then give 1 dose of PPSV23 at least 1 year later. If the patient already received PPSV23, give the dose of PCV13 at least 1 year after they received the most recent dose of PPSV23. Anyone who received any doses of PPSV23 before age 65 should receive 1 final dose of the vaccine at age 65 or older. Administer this last dose at least 5 years after the prior PPSV23 dose.        Review of Systems   Constitutional: Negative for chills and fever.   HENT: Negative for congestion, ear pain, hearing loss and sore throat.    Eyes: Negative for pain and visual disturbance.   Respiratory: Negative for cough and shortness of breath.    Cardiovascular: Negative for chest pain, palpitations and peripheral edema.   Gastrointestinal: Negative for abdominal  "pain, constipation, diarrhea, heartburn, hematochezia and nausea.   Breasts:  Negative for tenderness, breast mass and discharge.   Genitourinary: Negative for dysuria, frequency, genital sores, hematuria, pelvic pain, urgency, vaginal bleeding and vaginal discharge.   Musculoskeletal: Positive for arthralgias, joint swelling and myalgias.   Skin: Negative for rash.   Neurological: Negative for dizziness, weakness, headaches and paresthesias.   Psychiatric/Behavioral: Negative for mood changes. The patient is not nervous/anxious.          OBJECTIVE:   /70 (BP Location: Right arm, Patient Position: Sitting, Cuff Size: Adult Large)   Pulse 80   Temp 98.8  F (37.1  C) (Oral)   Ht 1.695 m (5' 6.75\")   Wt 113.7 kg (250 lb 11.2 oz)   SpO2 98%   BMI 39.56 kg/m   Estimated body mass index is 39.56 kg/m  as calculated from the following:    Height as of this encounter: 1.695 m (5' 6.75\").    Weight as of this encounter: 113.7 kg (250 lb 11.2 oz).  Physical Exam  GENERAL APPEARANCE: healthy, alert and no distress  EYES: Eyes grossly normal to inspection, PERRL and conjunctivae and sclerae normal  HENT: ear canals and TM's normal, nose and mouth without ulcers or lesions, oropharynx clear and oral mucous membranes moist  NECK: no adenopathy, no asymmetry, masses, or scars and thyroid normal to palpation  RESP: lungs clear to auscultation - no rales, rhonchi or wheezes  BREAST: normal without masses, tenderness or nipple discharge and no palpable axillary masses or adenopathy  CV: regular rate and rhythm, normal S1 S2, no S3 or S4, no murmur, click or rub, no peripheral edema and peripheral pulses strong  ABDOMEN: soft, nontender, no hepatosplenomegaly, no masses and bowel sounds normal  MS: no musculoskeletal defects are noted and gait is age appropriate without ataxia  SKIN: no suspicious lesions or rashes  NEURO: Normal strength and tone, sensory exam grossly normal, mentation intact and speech normal  PSYCH: " mentation appears normal and affect normal/bright    Diagnostic Test Results:  Labs reviewed in Epic    ASSESSMENT / PLAN:   (Z00.00) Encounter for Medicare annual wellness exam  (primary encounter diagnosis)  Comment: encourage annual wellness visit and vaccine up to date.  Plan: Comprehensive metabolic panel (BMP + Alb, Alk         Phos, ALT, AST, Total. Bili, TP), Lipid panel        Will get new covid booster soon.     (J45.31) Mild persistent asthma with acute exacerbation  Comment: asthma well controlled with inhalers. No wheezing, cough, sob.   Plan: albuterol (PROAIR HFA/PROVENTIL HFA/VENTOLIN         HFA) 108 (90 Base) MCG/ACT inhaler, mometasone         (ASMANEX, 60 METERED DOSES,) 220 MCG/INH         inhaler        Continue current inhalers.     (I10) Essential hypertension, benign  Comment: bp well controlled. No medication side effect. No cp headache, blurry vision.   Plan: lisinopril-hydrochlorothiazide (ZESTORETIC)         20-25 MG tablet        Continue current medication. Advise to monitor bp at home regular. If bp < 100/60 she will hold medication and follow-up . If bp > 140/90 she needs to follow-up.     (E78.5) Hyperlipidemia, unspecified hyperlipidemia type  Comment: doing well with medication.   Plan: simvastatin (ZOCOR) 40 MG tablet        Will follow-up lab.     (Z91.09) Environmental allergies  Comment: she has post nasal drainage and flonase nose spray helps.   Plan: fluticasone (FLONASE) 50 MCG/ACT nasal spray            (E66.01) Morbid obesity (H)  Comment: bmi 39.56 and combine with htn, hypercholesteremia. Failed to improve.   Plan: regular exercise and low fat diet.     (Z78.0) Menopause  Comment:   Plan: DEXA HIP/PELVIS/SPINE - Future        Advise regular exercise and otc multiply vitamins with minerals.       Patient has been advised of split billing requirements and indicates understanding: Yes    COUNSELING:  Reviewed preventive health counseling, as reflected in patient  "instructions       Regular exercise       Healthy diet/nutrition       Vision screening       Dental care       Bladder control       Fall risk prevention       Osteoporosis prevention/bone health    Estimated body mass index is 39.56 kg/m  as calculated from the following:    Height as of this encounter: 1.695 m (5' 6.75\").    Weight as of this encounter: 113.7 kg (250 lb 11.2 oz).    Weight management plan: Discussed healthy diet and exercise guidelines    She reports that she has never smoked. She has never used smokeless tobacco.      Appropriate preventive services were discussed with this patient, including applicable screening as appropriate for cardiovascular disease, diabetes, osteopenia/osteoporosis, and glaucoma.  As appropriate for age/gender, discussed screening for colorectal cancer, prostate cancer, breast cancer, and cervical cancer. Checklist reviewing preventive services available has been given to the patient.    Reviewed patients plan of care and provided an AVS. The Basic Care Plan (routine screening as documented in Health Maintenance) for Imelda meets the Care Plan requirement. This Care Plan has been established and reviewed with the Patient.    Counseling Resources:  ATP IV Guidelines  Pooled Cohorts Equation Calculator  Breast Cancer Risk Calculator  Breast Cancer: Medication to Reduce Risk  FRAX Risk Assessment  ICSI Preventive Guidelines  Dietary Guidelines for Americans, 2010  [employer]'s MyPlate  ASA Prophylaxis  Lung CA Screening    Johanny Tavares MD  [facility]    Identified Health Risks:  "

## 2022-09-14 NOTE — LETTER
My Asthma Action Plan    Name: Imelda El   YOB: 1950  Date: 9/14/2022   My doctor: Johanny Tavares MD   My clinic: [facility]        My Control Medicine: Mometasone furoate (Asmanex Twisthaler) - 220 mcg /inh  My Rescue Medicine: Albuterol (Proair/Ventolin/Proventil HFA) 2-4 puffs EVERY 4 HOURS as needed. Use a spacer if recommended by your provider.  My Oral Steroid Medicine: none My Asthma Severity:   Mild Persistent  Know your asthma triggers: upper respiratory infections and pollens               GREEN ZONE   Good Control    I feel good    No cough or wheeze    Can work, sleep and play without asthma symptoms       Take your asthma control medicine every day.     1. If exercise triggers your asthma, take your rescue medication    15 minutes before exercise or sports, and    During exercise if you have asthma symptoms  2. Spacer to use with inhaler: If you have a spacer, make sure to use it with your inhaler             YELLOW ZONE Getting Worse  I have ANY of these:    I do not feel good    Cough or wheeze    Chest feels tight    Wake up at night   1. Keep taking your Green Zone medications  2. Start taking your rescue medicine:    every 20 minutes for up to 1 hour. Then every 4 hours for 24-48 hours.  3. If you stay in the Yellow Zone for more than 12-24 hours, contact your doctor.  4. If you do not return to the Green Zone in 12-24 hours or you get worse, start taking your oral steroid medicine if prescribed by your provider.           RED ZONE Medical Alert - Get Help  I have ANY of these:    I feel awful    Medicine is not helping    Breathing getting harder    Trouble walking or talking    Nose opens wide to breathe       1. Take your rescue medicine NOW  2. If your provider has prescribed an oral steroid medicine, start taking it NOW  3. Call your doctor NOW  4. If you are still in the Red Zone after 20 minutes and you have not reached your doctor:    Take  your rescue medicine again and    Call 911 or go to the emergency room right away    See your regular doctor within 2 weeks of an Emergency Room or Urgent Care visit for follow-up treatment.          Annual Reminders:  Meet with Asthma Educator,  Flu Shot in the Fall, consider Pneumonia Vaccination for patients with asthma (aged 19 and older).    Pharmacy: [place], [place] - [place], [place] - [address]    Electronically signed by Johanny Tavares MD   Date: 09/14/22                      Asthma Triggers  How To Control Things That Make Your Asthma Worse    Triggers are things that make your asthma worse.  Look at the list below to help you find your triggers and what you can do about them.  You can help prevent asthma flare-ups by staying away from your triggers.      Trigger                                                          What you can do   Cigarette Smoke  Tobacco smoke can make asthma worse. Do not allow smoking in your home, car or around you.  Be sure no one smokes at a child s day care or school.  If you smoke, ask your health care provider for ways to help you quit.  Ask family members to quit too.  Ask your health care provider for a referral to Quit Plan to help you quit smoking, or call 7-728-537-PLAN.     Colds, Flu, Bronchitis  These are common triggers of asthma. Wash your hands often.  Don t touch your eyes, nose or mouth.  Get a flu shot every year.     Dust Mites  These are tiny bugs that live in cloth or carpet. They are too small to see. Wash sheets and blankets in hot water every week.   Encase pillows and mattress in dust mite proof covers.  Avoid having carpet if you can. If you have carpet, vacuum weekly.   Use a dust mask and HEPA vacuum.   Pollen and Outdoor Mold  Some people are allergic to trees, grass, or weed pollen, or molds. Try to keep your windows closed.  Limit time out doors when pollen count is high.   Ask you health care provider about  taking medicine during allergy season.     Animal Dander  Some people are allergic to skin flakes, urine or saliva from pets with fur or feathers. Keep pets with fur or feathers out of your home.    If you can t keep the pet outdoors, then keep the pet out of your bedroom.  Keep the bedroom door closed.  Keep pets off cloth furniture and away from stuffed toys.     Mice, Rats, and Cockroaches   Some people are allergic to the waste from these pests.   Cover food and garbage.  Clean up spills and food crumbs.  Store grease in the refrigerator.   Keep food out of the bedroom.   Indoor Mold  This can be a trigger if your home has high moisture. Fix leaking faucets, pipes, or other sources of water.   Clean moldy surfaces.  Dehumidify basement if it is damp and smelly.   Smoke, Strong Odors, and Sprays  These can reduce air quality. Stay away from strong odors and sprays, such as perfume, powder, hair spray, paints, smoke incense, paint, cleaning products, candles and new carpet.   Exercise or Sports  Some people with asthma have this trigger. Be active!  Ask your doctor about taking medicine before sports or exercise to prevent symptoms.    Warm up for 5-10 minutes before and after sports or exercise.     Other Triggers of Asthma  Cold air:  Cover your nose and mouth with a scarf.  Sometimes laughing or crying can be a trigger.  Some medicines and food can trigger asthma.

## 2022-09-14 NOTE — PROGRESS NOTES
"    She is at risk for lack of exercise and has been provided with information to increase physical activity for the benefit of her well-being.  Answers for HPI/ROS submitted by the patient on 9/11/2022  In general, how would you rate your overall physical health?: good  Frequency of exercise:: None  Do you usually eat at least 4 servings of fruit and vegetables a day, include whole grains & fiber, and avoid regularly eating high fat or \"junk\" foods? : Yes  Taking medications regularly:: Yes  Activities of Daily Living: no assistance needed  Home safety: no safety concerns identified  Hearing Impairment:: no hearing concerns  In the past 6 months, have you been bothered by leaking of urine?: No  abdominal pain: No  Blood in stool: No  Blood in urine: No  chest pain: No  chills: No  congestion: No  constipation: No  cough: No  diarrhea: No  dizziness: No  ear pain: No  eye pain: No  nervous/anxious: No  fever: No  frequency: No  genital sores: No  headaches: No  hearing loss: No  heartburn: No  arthralgias: Yes  joint swelling: Yes  peripheral edema: No  mood changes: No  myalgias: Yes  nausea: No  dysuria: No  palpitations: No  Skin sensation changes: No  sore throat: No  urgency: No  rash: No  shortness of breath: No  visual disturbance: No  weakness: No  pelvic pain: No  vaginal bleeding: No  vaginal discharge: No  tenderness: No  breast mass: No  breast discharge: No  In general, how would you rate your overall mental or emotional health?: excellent  Additional concerns today:: Yes        "

## 2022-09-15 LAB
ALBUMIN SERPL BCG-MCNC: 4.5 G/DL (ref 3.5–5.2)
ALP SERPL-CCNC: 62 U/L (ref 35–104)
ALT SERPL W P-5'-P-CCNC: 37 U/L (ref 10–35)
ANION GAP SERPL CALCULATED.3IONS-SCNC: 9 MMOL/L (ref 7–15)
AST SERPL W P-5'-P-CCNC: 31 U/L (ref 10–35)
BILIRUB SERPL-MCNC: 0.4 MG/DL
BUN SERPL-MCNC: 17.9 MG/DL (ref 8–23)
CALCIUM SERPL-MCNC: 10.1 MG/DL (ref 8.8–10.2)
CHLORIDE SERPL-SCNC: 101 MMOL/L (ref 98–107)
CHOLEST SERPL-MCNC: 191 MG/DL
CREAT SERPL-MCNC: 0.81 MG/DL (ref 0.51–0.95)
DEPRECATED HCO3 PLAS-SCNC: 30 MMOL/L (ref 22–29)
GFR SERPL CREATININE-BSD FRML MDRD: 77 ML/MIN/1.73M2
GLUCOSE SERPL-MCNC: 197 MG/DL (ref 70–99)
HDLC SERPL-MCNC: 56 MG/DL
LDLC SERPL CALC-MCNC: 82 MG/DL
NONHDLC SERPL-MCNC: 135 MG/DL
POTASSIUM SERPL-SCNC: 4.3 MMOL/L (ref 3.4–5.3)
PROT SERPL-MCNC: 6.9 G/DL (ref 6.4–8.3)
SODIUM SERPL-SCNC: 140 MMOL/L (ref 136–145)
TRIGL SERPL-MCNC: 263 MG/DL

## 2022-09-16 ENCOUNTER — MYC MEDICAL ADVICE (OUTPATIENT)
Dept: FAMILY MEDICINE | Facility: CLINIC | Age: 72
End: 2022-09-16

## 2022-10-14 ENCOUNTER — ANCILLARY PROCEDURE (OUTPATIENT)
Dept: BONE DENSITY | Facility: CLINIC | Age: 72
End: 2022-10-14
Attending: FAMILY MEDICINE
Payer: COMMERCIAL

## 2022-10-14 DIAGNOSIS — Z78.0 MENOPAUSE: ICD-10-CM

## 2022-10-14 PROCEDURE — 77080 DXA BONE DENSITY AXIAL: CPT | Mod: TC | Performed by: RADIOLOGY

## 2023-02-06 ENCOUNTER — VIRTUAL VISIT (OUTPATIENT)
Dept: URGENT CARE | Facility: CLINIC | Age: 73
End: 2023-02-06
Payer: COMMERCIAL

## 2023-02-06 ENCOUNTER — NURSE TRIAGE (OUTPATIENT)
Dept: NURSING | Facility: CLINIC | Age: 73
End: 2023-02-06
Payer: COMMERCIAL

## 2023-02-06 DIAGNOSIS — U07.1 CLINICAL DIAGNOSIS OF COVID-19: Primary | ICD-10-CM

## 2023-02-06 PROCEDURE — 99213 OFFICE O/P EST LOW 20 MIN: CPT | Mod: CS

## 2023-02-06 NOTE — TELEPHONE ENCOUNTER
RN COVID TREATMENT VISIT  02/06/23    Imelda El  72 year old  Current weight? 250    Has the patient been seen by a primary care provider at an [facility] or [facility] within the past two years? Yes.   Have you been in close proximity to/do you have a known exposure to a person with a confirmed case of influenza? Yes  Please call  and schedule a virtual appointment for COVID treatment options.      Patient was traveling from [place] in many airports. Different exposure to influenza and covid. Please call  and schedule a virtual appointment for COVID treatment options.      Alice Skinner RN on 2/6/2023 at 12:16 PM

## 2023-02-06 NOTE — PROGRESS NOTES
"Imelda is a 72 year old who is being evaluated via a billable telephone visit.      Tested + this AM.  Sx started 2/5.  COVID vaccinated and boosted.  Had Paxlovid in May.  COLD sx.    What phone number would you like to be contacted at? cell  How would you like to obtain your AVS? MyChart    Distant Location (provider location):  Off-site    Assessment & Plan     Clinical diagnosis of COVID-19    - nirmatrelvir and ritonavir (PAXLOVID) therapy pack; Take 3 tablets by mouth 2 times daily for 5 days (Take 2 Nirmatrelvir tablets and 1 Ritonavir tablet twice daily for 5 days)56}     COVID-19 positive patient.  Encounter for consideration of medication intervention. Patient does qualify for a prescription. Full discussion with patient including medication options, risks and benefits. Potential drug interactions reviewed with patient.     Treatment Planned Paxlovid RX sent to [facility]    Temporary change to home medications:   Hold Asmanex and Flonase while on Paxlovid x 5 days.  Hold simvastatin while on Paxlovid and resume one week after last dose of Paxlovid (hold for total of 12 days)    Estimated body mass index is 39.56 kg/m  as calculated from the following:    Height as of 9/14/22: 1.695 m (5' 6.75\").    Weight as of 9/14/22: 113.7 kg (250 lb 11.2 oz).  GFR Estimate   Date Value Ref Range Status   09/14/2022 77 >60 mL/min/1.73m2 Final     Comment:     Effective December 21, 2021 eGFRcr in adults is calculated using the 2021 CKD-EPI creatinine equation which includes age and gender (Mary Beth et al., NEJ, DOI: 10.1056/JFMVor0496782)   09/28/2020 >60 >60 mL/min/1.73m2 Final     Jil Pacheco MD  Virtual Urgent Care  [facility] VIRTUAL URGENT CARE    Subjective   Imelda is a 72 year old, presenting for the following health issues:  No chief complaint on file.      HPI       Tested + this AM.  Sx started 2/5.  COVID vaccinated and boosted.  Had Paxlovid in May.  COLD sx.      Review of Systems "   Constitutional, HEENT, cardiovascular, pulmonary, GI, , musculoskeletal, neuro, skin, endocrine and psych systems are negative, except as otherwise noted.      Objective           Vitals:  No vitals were obtained today due to virtual visit.    Physical Exam   healthy, alert and no distress  PSYCH: Alert and oriented times 3; coherent speech, normal   rate and volume, able to articulate logical thoughts, able   to abstract reason, no tangential thoughts, no hallucinations   or delusions  Her affect is normal and pleasant  RESP: No cough, no audible wheezing, able to talk in full sentences  Remainder of exam unable to be completed due to telephone visits          Phone call duration: 11 minutes

## 2023-02-06 NOTE — TELEPHONE ENCOUNTER
Thought she came down with cold on Saturday.  Tested positive this morning for covid-19. Today is the third day.      COVID Positive/Requesting COVID treatment    Patient is positive for COVID and requesting treatment options.    Date of positive COVID test (PCR or at home)? 2-6-23  Current COVID symptoms: cough, fatigue, muscle or body aches and congestion or runny nose  Date COVID symptoms began: 2-4-23    Message should be routed to clinic RN pool. Best phone number to use for call back: [phone].   Sol Peralta RN  [facility]      Reason for Disposition    [1] HIGH RISK for severe COVID complications (e.g., weak immune system, age > 64 years, obesity with BMI of 30 or higher, pregnant, chronic lung disease or other chronic medical condition) AND [2] COVID symptoms (e.g., cough, fever)  (Exceptions: Already seen by PCP and no new or worsening symptoms.)    Additional Information    Negative: SEVERE difficulty breathing (e.g., struggling for each breath, speaks in single words)    Negative: Difficult to awaken or acting confused (e.g., disoriented, slurred speech)    Negative: Bluish (or gray) lips or face now    Negative: Shock suspected (e.g., cold/pale/clammy skin, too weak to stand, low BP, rapid pulse)    Negative: Sounds like a life-threatening emergency to the triager    Negative: [1] Diagnosed or suspected COVID-19 AND [2] symptoms lasting 3 or more weeks    Negative: [1] COVID-19 exposure AND [2] no symptoms    Negative: COVID-19 vaccine reaction suspected (e.g., fever, headache, muscle aches) occurring 1 to 3 days after getting vaccine    Negative: COVID-19 vaccine, questions about    Negative: [1] Lives with someone known to have influenza (flu test positive) AND [2] flu-like symptoms (e.g., cough, runny nose, sore throat, SOB; with or without fever)    Negative: [1] Adult with possible COVID-19 symptoms AND [2] triager concerned about severity of symptoms or other causes     Negative: COVID-19 and breastfeeding, questions about    Negative: SEVERE or constant chest pain or pressure  (Exception: Mild central chest pain, present only when coughing.)    Negative: MODERATE difficulty breathing (e.g., speaks in phrases, SOB even at rest, pulse 100-120)    Negative: Headache and stiff neck (can't touch chin to chest)    Negative: Oxygen level (e.g., pulse oximetry) 90 percent or lower    Negative: Chest pain or pressure  (Exception: MILD central chest pain, present only when coughing)    Negative: Patient sounds very sick or weak to the triager    Negative: MILD difficulty breathing (e.g., minimal/no SOB at rest, SOB with walking, pulse <100)    Negative: Fever > 103 F (39.4 C)    Negative: [1] Fever > 101 F (38.3 C) AND [2] over 60 years of age    Negative: [1] Fever > 100.0 F (37.8 C) AND [2] bedridden (e.g., nursing home patient, CVA, chronic illness, recovering from surgery)    Protocols used: CORONAVIRUS (COVID-19) DIAGNOSED OR [id number]

## 2023-09-05 ENCOUNTER — PATIENT OUTREACH (OUTPATIENT)
Dept: CARE COORDINATION | Facility: CLINIC | Age: 73
End: 2023-09-05
Payer: COMMERCIAL

## 2023-09-07 ENCOUNTER — HOSPITAL ENCOUNTER (OUTPATIENT)
Dept: MAMMOGRAPHY | Facility: CLINIC | Age: 73
Discharge: HOME | End: 2023-09-07
Attending: FAMILY MEDICINE | Admitting: FAMILY MEDICINE
Payer: COMMERCIAL

## 2023-09-07 DIAGNOSIS — Z12.31 VISIT FOR SCREENING MAMMOGRAM: ICD-10-CM

## 2023-09-07 PROCEDURE — 77067 SCR MAMMO BI INCL CAD: CPT

## 2023-09-19 ENCOUNTER — PATIENT OUTREACH (OUTPATIENT)
Dept: CARE COORDINATION | Facility: CLINIC | Age: 73
End: 2023-09-19
Payer: COMMERCIAL

## 2023-10-21 ENCOUNTER — HEALTH MAINTENANCE LETTER (OUTPATIENT)
Age: 73
End: 2023-10-21

## 2023-10-29 ASSESSMENT — ENCOUNTER SYMPTOMS
FREQUENCY: 0
NAUSEA: 0
PALPITATIONS: 0
DIZZINESS: 0
NERVOUS/ANXIOUS: 0
ABDOMINAL PAIN: 0
CONSTIPATION: 0
PARESTHESIAS: 0
DYSURIA: 0
DIARRHEA: 0
JOINT SWELLING: 0
COUGH: 0
MYALGIAS: 0
FEVER: 0
HEADACHES: 0
WEAKNESS: 0
HEARTBURN: 0
CHILLS: 0
BREAST MASS: 0
SHORTNESS OF BREATH: 0
SORE THROAT: 0
HEMATOCHEZIA: 0
HEMATURIA: 0
EYE PAIN: 0
ARTHRALGIAS: 0

## 2023-10-29 ASSESSMENT — ASTHMA QUESTIONNAIRES: ACT_TOTALSCORE: 23

## 2023-10-29 ASSESSMENT — ACTIVITIES OF DAILY LIVING (ADL): CURRENT_FUNCTION: NO ASSISTANCE NEEDED

## 2023-11-02 ENCOUNTER — OFFICE VISIT (OUTPATIENT)
Dept: FAMILY MEDICINE | Facility: CLINIC | Age: 73
End: 2023-11-02
Payer: COMMERCIAL

## 2023-11-02 VITALS
SYSTOLIC BLOOD PRESSURE: 122 MMHG | TEMPERATURE: 98.3 F | WEIGHT: 248 LBS | DIASTOLIC BLOOD PRESSURE: 80 MMHG | RESPIRATION RATE: 20 BRPM | HEART RATE: 75 BPM | OXYGEN SATURATION: 95 % | HEIGHT: 67 IN | BODY MASS INDEX: 38.92 KG/M2

## 2023-11-02 DIAGNOSIS — I10 ESSENTIAL HYPERTENSION, BENIGN: ICD-10-CM

## 2023-11-02 DIAGNOSIS — J45.31 MILD PERSISTENT ASTHMA WITH ACUTE EXACERBATION: ICD-10-CM

## 2023-11-02 DIAGNOSIS — E66.01 MORBID OBESITY (H): ICD-10-CM

## 2023-11-02 DIAGNOSIS — Z91.09 ENVIRONMENTAL ALLERGIES: ICD-10-CM

## 2023-11-02 DIAGNOSIS — Z00.00 ENCOUNTER FOR MEDICARE ANNUAL WELLNESS EXAM: Primary | ICD-10-CM

## 2023-11-02 DIAGNOSIS — E78.5 HYPERLIPIDEMIA, UNSPECIFIED HYPERLIPIDEMIA TYPE: ICD-10-CM

## 2023-11-02 LAB
ALBUMIN SERPL BCG-MCNC: 4.4 G/DL (ref 3.5–5.2)
ALP SERPL-CCNC: 60 U/L (ref 35–104)
ALT SERPL W P-5'-P-CCNC: 49 U/L (ref 0–50)
ANION GAP SERPL CALCULATED.3IONS-SCNC: 13 MMOL/L (ref 7–15)
AST SERPL W P-5'-P-CCNC: 50 U/L (ref 0–45)
BILIRUB SERPL-MCNC: 0.5 MG/DL
BUN SERPL-MCNC: 12.8 MG/DL (ref 8–23)
CALCIUM SERPL-MCNC: 9.8 MG/DL (ref 8.8–10.2)
CHLORIDE SERPL-SCNC: 100 MMOL/L (ref 98–107)
CHOLEST SERPL-MCNC: 186 MG/DL
CREAT SERPL-MCNC: 0.83 MG/DL (ref 0.51–0.95)
DEPRECATED HCO3 PLAS-SCNC: 27 MMOL/L (ref 22–29)
EGFRCR SERPLBLD CKD-EPI 2021: 74 ML/MIN/1.73M2
ERYTHROCYTE [DISTWIDTH] IN BLOOD BY AUTOMATED COUNT: 12.2 % (ref 10–15)
GLUCOSE SERPL-MCNC: 163 MG/DL (ref 70–99)
HBA1C MFR BLD: 6.8 % (ref 0–5.6)
HCT VFR BLD AUTO: 43.5 % (ref 35–47)
HDLC SERPL-MCNC: 55 MG/DL
HGB BLD-MCNC: 14.6 G/DL (ref 11.7–15.7)
LDLC SERPL CALC-MCNC: 101 MG/DL
MCH RBC QN AUTO: 30.9 PG (ref 26.5–33)
MCHC RBC AUTO-ENTMCNC: 33.6 G/DL (ref 31.5–36.5)
MCV RBC AUTO: 92 FL (ref 78–100)
NONHDLC SERPL-MCNC: 131 MG/DL
PLATELET # BLD AUTO: 258 10E3/UL (ref 150–450)
POTASSIUM SERPL-SCNC: 4.2 MMOL/L (ref 3.4–5.3)
PROT SERPL-MCNC: 6.9 G/DL (ref 6.4–8.3)
RBC # BLD AUTO: 4.72 10E6/UL (ref 3.8–5.2)
SODIUM SERPL-SCNC: 140 MMOL/L (ref 135–145)
TRIGL SERPL-MCNC: 149 MG/DL
TSH SERPL DL<=0.005 MIU/L-ACNC: 1.44 UIU/ML (ref 0.3–4.2)
WBC # BLD AUTO: 4.3 10E3/UL (ref 4–11)

## 2023-11-02 PROCEDURE — G0439 PPPS, SUBSEQ VISIT: HCPCS | Performed by: FAMILY MEDICINE

## 2023-11-02 PROCEDURE — 83036 HEMOGLOBIN GLYCOSYLATED A1C: CPT | Performed by: FAMILY MEDICINE

## 2023-11-02 PROCEDURE — 36415 COLL VENOUS BLD VENIPUNCTURE: CPT | Performed by: FAMILY MEDICINE

## 2023-11-02 PROCEDURE — 99214 OFFICE O/P EST MOD 30 MIN: CPT | Mod: 25 | Performed by: FAMILY MEDICINE

## 2023-11-02 PROCEDURE — 84443 ASSAY THYROID STIM HORMONE: CPT | Performed by: FAMILY MEDICINE

## 2023-11-02 PROCEDURE — 85027 COMPLETE CBC AUTOMATED: CPT | Performed by: FAMILY MEDICINE

## 2023-11-02 PROCEDURE — 80061 LIPID PANEL: CPT | Performed by: FAMILY MEDICINE

## 2023-11-02 PROCEDURE — 80053 COMPREHEN METABOLIC PANEL: CPT | Performed by: FAMILY MEDICINE

## 2023-11-02 RX ORDER — RESPIRATORY SYNCYTIAL VIRUS VACCINE 120MCG/0.5
0.5 KIT INTRAMUSCULAR ONCE
Qty: 1 EACH | Refills: 0 | Status: CANCELLED | OUTPATIENT
Start: 2023-11-02 | End: 2023-11-02

## 2023-11-02 RX ORDER — LISINOPRIL AND HYDROCHLOROTHIAZIDE 20; 25 MG/1; MG/1
1 TABLET ORAL DAILY
Qty: 90 TABLET | Refills: 3 | Status: SHIPPED | OUTPATIENT
Start: 2023-11-02

## 2023-11-02 RX ORDER — FLUTICASONE PROPIONATE 50 MCG
2 SPRAY, SUSPENSION (ML) NASAL DAILY
Qty: 15.8 ML | Refills: 11 | Status: SHIPPED | OUTPATIENT
Start: 2023-11-02

## 2023-11-02 RX ORDER — ALBUTEROL SULFATE 90 UG/1
2 AEROSOL, METERED RESPIRATORY (INHALATION) EVERY 6 HOURS PRN
Qty: 18 G | Refills: 5 | Status: SHIPPED | OUTPATIENT
Start: 2023-11-02 | End: 2023-12-01

## 2023-11-02 RX ORDER — SIMVASTATIN 40 MG
40 TABLET ORAL AT BEDTIME
Qty: 90 TABLET | Refills: 3 | Status: SHIPPED | OUTPATIENT
Start: 2023-11-02

## 2023-11-02 ASSESSMENT — ENCOUNTER SYMPTOMS
HEMATOCHEZIA: 0
DIZZINESS: 0
BREAST MASS: 0
DIARRHEA: 0
ARTHRALGIAS: 0
FEVER: 0
COUGH: 0
SHORTNESS OF BREATH: 0
HEADACHES: 0
NERVOUS/ANXIOUS: 0
PALPITATIONS: 0
MYALGIAS: 0
PARESTHESIAS: 0
JOINT SWELLING: 0
SORE THROAT: 0
NAUSEA: 0
HEARTBURN: 0
EYE PAIN: 0
WEAKNESS: 0
HEMATURIA: 0
FREQUENCY: 0
DYSURIA: 0
CONSTIPATION: 0
ABDOMINAL PAIN: 0
CHILLS: 0

## 2023-11-02 ASSESSMENT — ACTIVITIES OF DAILY LIVING (ADL): CURRENT_FUNCTION: NO ASSISTANCE NEEDED

## 2023-11-02 NOTE — PROGRESS NOTES
"She is at risk for lack of exercise and has been provided with information to increase physical activity for the benefit of her well-being.  Answers submitted by the patient for this visit:  Annual Preventive Visit (Submitted on 10/29/2023)  Chief Complaint: Annual Exam:  In general, how would you rate your overall physical health?: good  Frequency of exercise:: None  Do you usually eat at least 4 servings of fruit and vegetables a day, include whole grains & fiber, and avoid regularly eating high fat or \"junk\" foods? : Yes  Taking medications regularly:: Yes  Medication side effects:: None  Activities of Daily Living: no assistance needed  Home safety: no safety concerns identified  Hearing Impairment:: no hearing concerns  In the past 6 months, have you been bothered by leaking of urine?: No  abdominal pain: No  Blood in stool: No  Blood in urine: No  chest pain: No  chills: No  congestion: No  constipation: No  cough: No  diarrhea: No  dizziness: No  ear pain: No  eye pain: No  nervous/anxious: No  fever: No  frequency: No  genital sores: No  headaches: No  hearing loss: No  heartburn: No  arthralgias: No  joint swelling: No  peripheral edema: No  mood changes: No  myalgias: No  nausea: No  dysuria: No  palpitations: No  Skin sensation changes: No  sore throat: No  urgency: No  rash: No  shortness of breath: No  visual disturbance: No  weakness: No  pelvic pain: No  vaginal bleeding: No  vaginal discharge: No  tenderness: No  breast mass: No  breast discharge: No  In general, how would you rate your overall mental or emotional health?: excellent  Additional concerns today:: No    "

## 2023-11-02 NOTE — PATIENT INSTRUCTIONS
"Patient Education   Personalized Prevention Plan  You are due for the preventive services outlined below.  Your care team is available to assist you in scheduling these services.  If you have already completed any of these items, please share that information with your care team to update in your medical record.  Health Maintenance Due   Topic Date Due     RSV VACCINE 60+ (1 - 1-dose 60+ series) Never done     Annual Wellness Visit  09/14/2023     ANNUAL REVIEW OF HM ORDERS  09/14/2023     Asthma Action Plan - yearly  09/14/2023     Learning About Being Physically Active  What is physical activity?     Being physically active means doing any kind of activity that gets your body moving.  The types of physical activity that can help you get fit and stay healthy include:  Aerobic or \"cardio\" activities. These make your heart beat faster and make you breathe harder, such as brisk walking, riding a bike, or running. They strengthen your heart and lungs and build up your endurance.  Strength training activities. These make your muscles work against, or \"resist,\" something. Examples include lifting weights or doing push-ups. These activities help tone and strengthen your muscles and bones.  Stretches. These let you move your joints and muscles through their full range of motion. Stretching helps you be more flexible.  Reaching a balance between these three types of physical activity is important because each one contributes to your overall fitness.  What are the benefits of being active?  Being active is one of the best things you can do for your health. It helps you to:  Feel stronger and have more energy to do all the things you like to do.  Focus better at school or work.  Feel, think, and sleep better.  Reach and stay at a healthy weight.  Lose fat and build lean muscle.  Lower your risk for serious health problems, including diabetes, heart attack, high blood pressure, and some cancers.  Keep your heart, lungs, bones, " "muscles, and joints strong and healthy.  How can you make being active part of your life?  Start slowly. Make it your long-term goal to get at least 30 minutes of exercise on most days of the week. Walking is a good choice. You also may want to do other activities, such as running, swimming, cycling, or playing tennis or team sports.  Pick activities that you like--ones that make your heart beat faster, your muscles stronger, and your muscles and joints more flexible. If you find more than one thing you like doing, do them all. You don't have to do the same thing every day.  Get your heart pumping every day. Any activity that makes your heart beat faster and keeps it at that rate for a while counts.  Here are some great ways to get your heart beating faster:  Go for a brisk walk, run, or bike ride.  Go for a hike or swim.  Go in-line skating.  Play a game of touch football, basketball, or soccer.  Ride a bike.  Play tennis or racquetball.  Climb stairs.  Even some household chores can be aerobic--just do them at a faster pace. Vacuuming, raking or mowing the lawn, sweeping the garage, and washing and waxing the car all can help get your heart rate up.  Strengthen your muscles during the week. You don't have to lift heavy weights or grow big, bulky muscles to get stronger. Doing a few simple activities that make your muscles work against, or \"resist,\" something can help you get stronger.  For example, you can:  Do push-ups or sit-ups, which use your own body weight as resistance.  Lift weights or dumbbells or use stretch bands at home or in a gym or community center.  Stretch your muscles often. Stretching will help you as you become more active. It can help you stay flexible, loosen tight muscles, and avoid injury. It can also help improve your balance and posture and can be a great way to relax.  Be sure to stretch the muscles you'll be using when you work out. It's best to warm your muscles slightly before you " "stretch them. Walk or do some other light aerobic activity for a few minutes, and then start stretching.  When you stretch your muscles:  Do it slowly. Stretching is not about going fast or making sudden movements.  Don't push or bounce during a stretch.  Hold each stretch for at least 15 to 30 seconds, if you can. You should feel a stretch in the muscle, but not pain.  Breathe out as you do the stretch. Then breathe in as you hold the stretch. Don't hold your breath.  If you're worried about how more activity might affect your health, have a checkup before you start. Follow any special advice your doctor gives you for getting a smart start.  Where can you learn more?  Go to https://www.[employer].net/patiented  Enter W332 in the search box to learn more about \"Learning About Being Physically Active.\"  Current as of: October 10, 2022               Content Version: 13.7    4018-4396 [employer] (formerly [employer]   Care instructions adapted under license by your healthcare professional. If you have questions about a medical condition or this instruction, always ask your healthcare professional. [employer] (formerly [employer] disclaims any warranty or liability for your use of this information.         "

## 2023-11-02 NOTE — PROGRESS NOTES
"SUBJECTIVE:   Imelda is a 73 year old who presents for Preventive Visit.      11/2/2023     8:13 AM   Additional Questions   Roomed by Gen GAGE, CMA       Are you in the first 12 months of your Medicare coverage?  No    Healthy Habits:     In general, how would you rate your overall health?  Good    Frequency of exercise:  None    Do you usually eat at least 4 servings of fruit and vegetables a day, include whole grains    & fiber and avoid regularly eating high fat or \"junk\" foods?  Yes    Taking medications regularly:  Yes    Medication side effects:  None    Ability to successfully perform activities of daily living:  No assistance needed    Home Safety:  No safety concerns identified    Hearing Impairment:  No hearing concerns    In the past 6 months, have you been bothered by leaking of urine?  No    In general, how would you rate your overall mental or emotional health?  Excellent    Additional concerns today:  No      Today's PHQ-2 Score:       11/2/2023     8:07 AM   PHQ-2 ( 1999 [employer])   Q1: Little interest or pleasure in doing things 0   Q2: Feeling down, depressed or hopeless 0   PHQ-2 Score 0   Q1: Little interest or pleasure in doing things Not at all   Q2: Feeling down, depressed or hopeless Not at all   PHQ-2 Score 0           Have you ever done Advance Care Planning? (For example, a Health Directive, POLST, or a discussion with a medical provider or your loved ones about your wishes): Yes, patient states has an Advance Care Planning document and will bring a copy to the clinic.       Fall risk  Fallen 2 or more times in the past year?: No  Any fall with injury in the past year?: No    Cognitive Screening   1) Repeat 3 items (Leader, Season, Table)    2) Clock draw: NORMAL  3) 3 item recall: Recalls 3 objects  Results: 3 items recalled: COGNITIVE IMPAIRMENT LESS LIKELY    Mini-CogTM Copyright S Marilynn. Licensed by the author for use in [facility]; reprinted with permission (sheyla@.[facility]). " All rights reserved.      Do you have sleep apnea, excessive snoring or daytime drowsiness? : no    Reviewed and updated as needed this visit by clinical staff   Tobacco  Allergies  Meds  Problems  Med Hx  Surg Hx  Fam Hx          Reviewed and updated as needed this visit by Provider   Tobacco  Allergies  Meds  Problems  Med Hx  Surg Hx  Fam Hx         Social History     Tobacco Use    Smoking status: Never     Passive exposure: Never    Smokeless tobacco: Never   Substance Use Topics    Alcohol use: Yes     Comment: seldom, maybe 1 or 2 times per month             10/29/2023     1:23 PM   Alcohol Use   Prescreen: >3 drinks/day or >7 drinks/week? No     Do you have a current opioid prescription? No    Do you use any other controlled substances or medications that are not prescribed by a provider? None          Hyperlipidemia Follow-Up    Are you regularly taking any medication or supplement to lower your cholesterol?   Yes- zocor  Are you having muscle aches or other side effects that you think could be caused by your cholesterol lowering medication?  No    Hypertension Follow-up    Do you check your blood pressure regularly outside of the clinic? No   Are you following a low salt diet? Yes  Are your blood pressures ever more than 140 on the top number (systolic) OR more   than 90 on the bottom number (diastolic), for example 140/90? No    Asthma Follow-Up    Was ACT completed today?  Yes        10/29/2023     1:25 PM   ACT Total Scores   ACT TOTAL SCORE (Goal Greater than or Equal to 20) 23   In the past 12 months, how many times did you visit the emergency room for your asthma without being admitted to the hospital? 0   In the past 12 months, how many times were you hospitalized overnight because of your asthma? 0        How many days per week do you miss taking your asthma controller medication?  0  Please describe any recent triggers for your asthma: upper respiratory infections  Have you had any  Emergency Room Visits, Urgent Care Visits, or Hospital Admissions since your last office visit?  No    Current providers sharing in care for this patient include:   Patient Care Team:  Johanny Tavares MD as PCP - General  Johanny Tavares MD as Assigned PCP    The following health maintenance items are reviewed in Epic and correct as of today:  Health Maintenance   Topic Date Due    RSV VACCINE 60+ (1 - 1-dose 60+ series) Never done    MEDICARE ANNUAL WELLNESS VISIT  09/14/2023    ANNUAL REVIEW OF HM ORDERS  09/14/2023    ASTHMA ACTION PLAN  09/14/2023    ASTHMA CONTROL TEST  05/02/2024    FALL RISK ASSESSMENT  11/02/2024    MAMMO SCREENING  09/07/2025    DEXA  10/14/2025    COLORECTAL CANCER SCREENING  07/23/2026    LIPID  09/14/2027    ADVANCE CARE PLANNING  09/14/2027    DTAP/TDAP/TD IMMUNIZATION (4 - Td or Tdap) 08/24/2028    HEPATITIS C SCREENING  Completed    PHQ-2 (once per calendar year)  Completed    INFLUENZA VACCINE  Completed    ZOSTER IMMUNIZATION  Completed    COVID-19 Vaccine  Completed    IPV IMMUNIZATION  Aged Out    HPV IMMUNIZATION  Aged Out    MENINGITIS IMMUNIZATION  Aged Out    RSV MONOCLONAL ANTIBODY  Aged Out    Pneumococcal Vaccine: 65+ Years  Discontinued     Lab work is in process  Labs reviewed in EPIC          Review of Systems   Constitutional:  Negative for chills and fever.   HENT:  Negative for congestion, ear pain, hearing loss and sore throat.    Eyes:  Negative for pain and visual disturbance.   Respiratory:  Negative for cough and shortness of breath.    Cardiovascular:  Negative for chest pain, palpitations and peripheral edema.   Gastrointestinal:  Negative for abdominal pain, constipation, diarrhea, heartburn, hematochezia and nausea.   Breasts:  Negative for tenderness, breast mass and discharge.   Genitourinary:  Negative for dysuria, frequency, genital sores, hematuria, pelvic pain, urgency, vaginal bleeding and vaginal discharge.   Musculoskeletal:  Negative for arthralgias, joint  "swelling and myalgias.   Skin:  Negative for rash.   Neurological:  Negative for dizziness, weakness, headaches and paresthesias.   Psychiatric/Behavioral:  Negative for mood changes. The patient is not nervous/anxious.          OBJECTIVE:   /80   Pulse 75   Temp 98.3  F (36.8  C) (Oral)   Resp 20   Ht 1.697 m (5' 6.8\")   Wt 112.5 kg (248 lb)   LMP  (LMP Unknown)   SpO2 95%   BMI 39.08 kg/m   Estimated body mass index is 39.08 kg/m  as calculated from the following:    Height as of this encounter: 1.697 m (5' 6.8\").    Weight as of this encounter: 112.5 kg (248 lb).  Physical Exam  GENERAL APPEARANCE: healthy, alert and no distress  EYES: Eyes grossly normal to inspection, PERRL and conjunctivae and sclerae normal  HENT: ear canals and TM's normal, nose and mouth without ulcers or lesions, oropharynx clear and oral mucous membranes moist  NECK: no adenopathy, no asymmetry, masses, or scars and thyroid normal to palpation  RESP: lungs clear to auscultation - no rales, rhonchi or wheezes  BREAST: normal without masses, tenderness or nipple discharge and no palpable axillary masses or adenopathy  CV: regular rate and rhythm, normal S1 S2, no S3 or S4, no murmur, click or rub, no peripheral edema and peripheral pulses strong  ABDOMEN: soft, nontender, no hepatosplenomegaly, no masses and bowel sounds normal  MS: no musculoskeletal defects are noted and gait is age appropriate without ataxia  SKIN: no suspicious lesions or rashes  NEURO: Normal strength and tone, sensory exam grossly normal, mentation intact and speech normal  PSYCH: mentation appears normal and affect normal/bright    Diagnostic Test Results:  Labs reviewed in Epic    ASSESSMENT / PLAN:   (Z00.00) Encounter for Medicare annual wellness exam  (primary encounter diagnosis)  Comment: encourage annual wcc and vaccine up date  Plan: advise to get RSV vaccine at pharmacy    (J45.31) Mild persistent asthma with acute exacerbation  Comment: doing " well with inhalers. No cough, wheezing, sob.   Plan: albuterol (PROAIR HFA/PROVENTIL HFA/VENTOLIN         HFA) 108 (90 Base) MCG/ACT inhaler, mometasone         (ASMANEX, 60 METERED DOSES,) 220 MCG/ACT         inhaler        Continue current inhalers    (Z91.09) Environmental allergies  Comment: she has environmental allergies to cause sneezing, nose discharge.   Plan: fluticasone (FLONASE) 50 MCG/ACT nasal spray        Continue nose spray prn    (I10) Essential hypertension, benign  Comment: bp reasonable control. She takes medication as instructed and no side effect. She dose not check bp at home but checked at [facility] blood donation center every 2 weeks and has been around 120-130/70-80.. denies cp, sob, palpitation, headache and blurry vision.   Plan: lisinopril-hydrochlorothiazide (ZESTORETIC)         20-25 MG tablet, Comprehensive metabolic panel         (BMP + Alb, Alk Phos, ALT, AST, Total. Bili,         TP), TSH with free T4 reflex, CBC with         platelets, Hemoglobin A1c        Continue current medication and monitor bp at home regularly. If bp is < 100/60 she will hold the medication.     (E78.5) Hyperlipidemia, unspecified hyperlipidemia type  Comment: doing well.   Plan: simvastatin (ZOCOR) 40 MG tablet, Lipid panel         reflex to direct LDL Fasting        Advise regular exercise and low fat diet. Continue current medication.     (E66.01) Morbid obesity (H)  Comment: bmi 39 with htn and hyperlipidemia. Failed to improve  Plan: advise regular exercise and health diet to loss weight.     Patient has been advised of split billing requirements and indicates understanding: Yes      COUNSELING:  Reviewed preventive health counseling, as reflected in patient instructions       Regular exercise       Healthy diet/nutrition       Vision screening       Hearing screening       Dental care       Bladder control       Fall risk prevention       Osteoporosis prevention/bone health       Colon cancer  "screening      BMI:   Estimated body mass index is 39.08 kg/m  as calculated from the following:    Height as of this encounter: 1.697 m (5' 6.8\").    Weight as of this encounter: 112.5 kg (248 lb).   Weight management plan: Discussed healthy diet and exercise guidelines      She reports that she has never smoked. She has never been exposed to tobacco smoke. She has never used smokeless tobacco.      Appropriate preventive services were discussed with this patient, including applicable screening as appropriate for fall prevention, nutrition, physical activity, Tobacco-use cessation, weight loss and cognition.  Checklist reviewing preventive services available has been given to the patient.    Reviewed patients plan of care and provided an AVS. The Basic Care Plan (routine screening as documented in Health Maintenance) for Imelda meets the Care Plan requirement. This Care Plan has been established and reviewed with the Patient.          Johanny Tavares MD  [facility]    Identified Health Risks:  I have reviewed Opioid Use Disorder and Substance Use Disorder risk factors and made any needed referrals.   "

## 2023-12-01 ENCOUNTER — OFFICE VISIT (OUTPATIENT)
Dept: FAMILY MEDICINE | Facility: CLINIC | Age: 73
End: 2023-12-01
Payer: COMMERCIAL

## 2023-12-01 VITALS
SYSTOLIC BLOOD PRESSURE: 130 MMHG | RESPIRATION RATE: 14 BRPM | DIASTOLIC BLOOD PRESSURE: 66 MMHG | BODY MASS INDEX: 38.77 KG/M2 | TEMPERATURE: 98 F | WEIGHT: 247 LBS | OXYGEN SATURATION: 98 % | HEART RATE: 77 BPM | HEIGHT: 67 IN

## 2023-12-01 DIAGNOSIS — E11.9 TYPE 2 DIABETES MELLITUS WITHOUT COMPLICATION, WITHOUT LONG-TERM CURRENT USE OF INSULIN (H): Primary | ICD-10-CM

## 2023-12-01 PROCEDURE — 99213 OFFICE O/P EST LOW 20 MIN: CPT | Performed by: FAMILY MEDICINE

## 2023-12-01 NOTE — PROGRESS NOTES
"  Assessment & Plan     (E11.9) Type 2 diabetes mellitus without complication, without long-term current use of insulin (H)  (primary encounter diagnosis)  Comment: new onset of DM. A1c 6.8. she is asymptomatic.   Plan: AMB Adult Diabetes Educator Referral        We discussed the DM diet control and exercise. Medication options addressed. Pt likes to postpone the medication at this moment. She wants to do diet control and exercise and agrees to follow-up in 3 month  DM eye exam and foot care addressed. Long term complication of DM addressed.           See Patient Instructions    Johanny Tavares MD  [facility] VALENTIN Segura is a 73 year old, presenting for the following health issues:  Diabetes      History of Present Illness       Diabetes:   She presents for follow up of diabetes.    She is not checking blood glucose.         She has no concerns regarding her diabetes at this time.   She is not experiencing numbness or burning in feet, excessive thirst, blurry vision, weight changes or redness, sores or blisters on feet.           She eats 2-3 servings of fruits and vegetables daily.She consumes 0 sweetened beverage(s) daily.She exercises with enough effort to increase her heart rate 9 or less minutes per day.  She exercises with enough effort to increase her heart rate 3 or less days per week.   She is taking medications regularly.        Review of Systems   Constitutional, HEENT, cardiovascular, pulmonary, gi and gu systems are negative, except as otherwise noted.      Objective    /66   Pulse 77   Temp 98  F (36.7  C)   Resp 14   Ht 1.697 m (5' 6.8\")   Wt 112 kg (247 lb)   LMP  (LMP Unknown)   SpO2 98%   BMI 38.92 kg/m    Body mass index is 38.92 kg/m .  Physical Exam   GENERAL: healthy, alert and no distress  Mental status exam; she is alert, orient to time, person and place. Normal thought content, speech, affect, mood and dress are noted.              "

## 2024-01-04 ENCOUNTER — ALLIED HEALTH/NURSE VISIT (OUTPATIENT)
Dept: EDUCATION SERVICES | Facility: CLINIC | Age: 74
End: 2024-01-04
Payer: COMMERCIAL

## 2024-01-04 VITALS — BODY MASS INDEX: 38.44 KG/M2 | WEIGHT: 244 LBS

## 2024-01-04 DIAGNOSIS — E11.9 TYPE 2 DIABETES MELLITUS WITHOUT COMPLICATION, WITHOUT LONG-TERM CURRENT USE OF INSULIN (H): ICD-10-CM

## 2024-01-04 PROCEDURE — G0108 DIAB MANAGE TRN  PER INDIV: HCPCS | Performed by: REGISTERED NURSE

## 2024-01-04 RX ORDER — LANCETS
EACH MISCELLANEOUS
Qty: 100 EACH | Refills: 4 | Status: SHIPPED | OUTPATIENT
Start: 2024-01-04 | End: 2024-02-06

## 2024-01-04 RX ORDER — BLOOD SUGAR DIAGNOSTIC
STRIP MISCELLANEOUS
Qty: 100 STRIP | Refills: 4 | Status: SHIPPED | OUTPATIENT
Start: 2024-01-04 | End: 2024-02-06

## 2024-01-04 NOTE — PATIENT INSTRUCTIONS
1. Test blood sugar once per day at different times. Key times are before breakfast or 2 hours after the largest meal.  2. Eat smaller amounts more often. Avoid skipping meals.  3. Include protein at meals and snacks.   4. Avoid sugary drinks (regular soda, lemonade, sweetened tea and Gatorade).  5. Eat fresh fruit instead of juice.  6. Include high fiber, whole grain foods instead of processed white foods. Healthier choices are whole grain cereals, whole wheat pasta, brown or wild rice and whole wheat bread.   7. Aim for foods with 3 grams of fiber or more per serving. Limit added sugar to 25 grams of added sugar per day.  8. Stay active every day; try not to sit for more than 30 minutes and walk 20-30 minutes most days of the week.   9. Bring meter and blood sugar log to next visit in about one month.    Blood sugar targets:   Before meals:   1-2 hours after meals: less 180  Bedtime:     A1c target of less than 7.0% (estimated average blood sugar of 154 on your meter)

## 2024-01-04 NOTE — PROGRESS NOTES
Diabetes Self-Management Education & Support    Type of Service: In Person Visit      ASSESSMENT:  Initial visit for newly diagnosed Type 2 diabetes. Instructed on what is diabetes and how to control it. Reviewed symptoms and treatment of high and low blood sugar. Instructed on general diet guidelines and the importance of weight control and daily activity. Demonstrated how to check blood sugar at home. Provided an Accu-Chek guide me glucometer.  Blood sugar in the office after breakfast was 134.  Imelda made some eating habit changes since learning her blood sugars were elevated.  The only suggestion change I have is to avoid juice and sugar soda.  Patient drinks 1 cup of sugar soda a week while bowling.     Patient's most recent   Lab Results   Component Value Date    A1C 6.8 11/02/2023     is meeting goal of <7.0    Diabetes knowledge and skills assessment:   Patient is knowledgeable in diabetes management concepts related to: Healthy Coping    Continue education with the following diabetes management concepts: Healthy Eating, Being Active, Monitoring, Taking Medication, Problem Solving, and Reducing Risks    Based on learning assessment above, most appropriate setting for further diabetes education would be: Individual setting.      PLAN  Check blood sugar once a day at different times.  Avoid juice and sugar soda.  Increase activity: Aim for 20 minutes most days of the week    Follow-up: 1 month    See Care Plan for co-developed, patient-state behavior change goals.  AVS provided for patient today.    Education Materials Provided:  [facility] Understanding Diabetes Booklet  Low carb snack ideas  American diabetes Association A1c to EAG    SUBJECTIVE/OBJECTIVE:  Presents for: Initial Assessment for new diagnosis  Accompanied by: Self  Diabetes education in the past 24mo: No  Focus of Visit: Healthy Eating  Diabetes type: Type 2  Date of diagnosis: 11/2/23  How confident are you filling out medical forms by  "yourself:: Quite a bit  Diabetes management related comments/concerns: food choices  Transportation concerns: No  Other concerns:: None  Cultural Influences/Ethnic Background:  Not  or     Diabetes Symptoms & Complications:  Fatigue: Sometimes  Neuropathy: No  Polydipsia: No  Polyphagia: No  Polyuria: No  Visual change: No  Slow healing wounds: No  Symptom course: Stable  Weight trend: Decreasing  Complications assessed today?: Yes  Autonomic neuropathy: No  CVA: No  Heart disease: No  Nephropathy: No  Peripheral neuropathy: No  Peripheral Vascular Disease: No  Retinopathy: No  Sexual dysfunction: No    Patient Problem List and Family Medical History reviewed for relevant medical history, current medical status, and diabetes risk factors.    Vitals:  Wt 110.7 kg (244 lb)   LMP  (LMP Unknown)   BMI 38.44 kg/m    Estimated body mass index is 38.44 kg/m  as calculated from the following:    Height as of 12/1/23: 1.697 m (5' 6.8\").    Weight as of this encounter: 110.7 kg (244 lb).   Last 3 BP:   BP Readings from Last 3 Encounters:   12/01/23 130/66   11/02/23 122/80   09/14/22 122/70       History   Smoking Status    Never   Smokeless Tobacco    Never       Labs:  Lab Results   Component Value Date    A1C 6.8 11/02/2023     Lab Results   Component Value Date     11/02/2023     08/30/2021     Lab Results   Component Value Date     11/02/2023     Direct Measure HDL   Date Value Ref Range Status   11/02/2023 55 >=50 mg/dL Final   ]  GFR Estimate   Date Value Ref Range Status   11/02/2023 74 >60 mL/min/1.73m2 Final   09/28/2020 >60 >60 mL/min/1.73m2 Final     GFR Estimate If Black   Date Value Ref Range Status   09/28/2020 >60 >60 mL/min/1.73m2 Final     Lab Results   Component Value Date    CR 0.83 11/02/2023     No results found for: \"MICROALBUMIN\"    Healthy Eating:  Healthy Eating Assessed Today: Yes  Cultural/Congregational diet restrictions?: No  Meal planning/habits: Avoiding sweets, " Smaller portions  How many times a week on average do you eat food made away from home (restaurant/take-out)?: 1  Meals include: Breakfast, Lunch, Dinner  Breakfast: 7:00 am 5 oz greek yogurt an 8 oz OJ, sometimes tea  Lunch: 11-11:30 am sandwich on wheat bread or home made soup or leftovers, coffee with coffee mate  Dinner: 5:30-6:00 pm tuna hotdish with pasta or meatloaf or steak with vegetables. Potato soup with leeks and ham. water  Snacks: Rarely eats after dinner. Sometimes Cheddar Smartpop popcorn  Other: drinks a lot of water during the day. 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 Avoiding sweets most of the time. May have 1 cookie or small serving of a dessert occasionally.  Beverages: Water, Tea, Coffee, Juice  Has patient met with a dietitian in the past?: No    Being Active:  Being Active Assessed Today: Yes (household chores, bowling twice per week)  Exercise:: Yes (Has a older Norditrack. The room is not ready for exercise yet.)  How intense was your typical exercise? : Light (like stretching or slow walking)  Barrier to exercise: None    Monitoring:  Blood Glucose Meter: Accu-chek      Taking Medications:  Current Treatments: None    Problem Solving:  Problem Solving Assessed Today: Yes  Is the patient at risk for hypoglycemia?: No  Is the patient at risk for DKA?: No    Reducing Risks:  Reducing Risks Assessed Today: Yes  Diabetes Risks: Age over 45 years, Hyperlipidemia, Sedentary Lifestyle, Family History  CAD Risks: Diabetes Mellitus, Dyslipidemia, Hypertension, Obesity, Post-menopausal, Sedentary lifestyle  Has dilated eye exam at least once a year?: Yes  Sees dentist every 6 months?: Yes  Feet checked by healthcare provider in the last year?: No    Healthy Coping:  Healthy Coping Assessed Today: Yes  Emotional response to diabetes: Ready to learn  Informal Support system:: Children, Family, Friends, Spouse  Stage of change: PREPARATION (Decided to change - considering how)  Support resources:  None  Patient Activation Measure Survey Score:       No data to display              Care Plan and Education Provided:  Care Plan: Diabetes   Updates made by Gisela Dash RD since 1/4/2024 12:00 AM        Problem: HbA1C Not In Goal         Goal: Establish Regular Follow-Ups with PCP         Task: Discuss with PCP the recommended timing for patient's next follow up visit(s)    Responsible User: Gisela Dash RD        Task: Discuss schedule for PCP visits with patient    Responsible User: Gisela Dash RD        Goal: Get HbA1C Level in Goal         Task: Educate patient on diabetes education self-management topics    Responsible User: Gisela Dash RD        Task: Educate patient on benefits of regular glucose monitoring Completed 1/4/2024   Responsible User: Gisela Dash RD        Task: Refer patient to appropriate extended care team member, as needed (Medication Therapy Management, Behavioral Health, Physical Therapy, etc.)    Responsible User: Gisela Dash RD        Task: Discuss diabetes treatment plan with patient    Responsible User: Gisela Dash RD        Problem: Diabetes Self-Management Education Needed to Optimize Self-Care Behaviors         Goal: Understand diabetes pathophysiology and disease progression         Task: Provide education on diabetes pathophysiology and disease progression specfic to patient's diabetes type Completed 1/4/2024   Responsible User: Gisela Dash RD        Goal: Healthy Eating - follow a healthy eating pattern for diabetes         Task: Provide education on portion control and consistency in amount, composition and timing of food intake    Responsible User: Gisela Dash RD        Task: Provide education on managing carbohydrate intake (carbohydrate counting, plate planning method, etc.)    Responsible User: Gisela Dash RD        Task: Provide education on weight management    Responsible User: Gisela Dash RD        Task: Provide  education on heart healthy eating    Responsible User: Gisela Dash RD        Task: Provide education on eating out    Responsible User: Gisela Dash RD        Task: Develop individualized healthy eating plan with patient Completed 1/4/2024   Responsible User: Gisela Dash RD        Goal: Being Active - get regular physical activity, working up to at least 150 minutes per week         Task: Provide education on relationship of activity to glucose and precautions to take if at risk for low glucose Completed 1/4/2024   Responsible User: Gisela Dash RD        Task: Discuss barriers to physical activity with patient Completed 1/4/2024   Responsible User: Gisela Dash RD        Task: Develop physical activity plan with patient    Responsible User: Gisela Dash RD        Task: Explore community resources including walking groups, assistance programs, and home videos    Responsible User: Gisela Dash RD        Goal: Monitoring - monitor glucose and ketones as directed    This Visit's Progress: 0%        Task: Provide education on blood glucose monitoring (purpose, proper technique, frequency, glucose targets, interpreting results, when to use glucose control solution, sharps disposal) Completed 1/4/2024   Responsible User: Gisela Dash RD        Task: Provide education on continuous glucose monitoring (sensor placement, use of zenobia or /reader, understanding glucose trends, alerts and alarms, differences between sensor glucose and blood glucose)    Responsible User: Gisela Dash RD        Task: Provide education on ketone monitoring (when to monitor, frequency, etc.)    Responsible User: Gisela Dash RD        Goal: Taking Medication - patient is consistently taking medications as directed         Task: Provide education on action of prescribed medication, including when to take and possible side effects    Responsible User: Gisela Dash RD        Task: Provide education  on insulin and injectable diabetes medications, including administration, storage, site selection and rotation for injection sites    Responsible User: Gisela Dash RD        Task: Discuss barriers to medication adherence with patient and provide management technique ideas as appropriate    Responsible User: Gisela Dash RD        Task: Provide education on frequency and refill details of medications    Responsible User: Gisela Dash RD        Goal: Problem Solving - know how to prevent and manage short-term diabetes complications         Task: Provide education on high blood glucose - causes, signs/symptoms, prevention and treatment Completed 1/4/2024   Responsible User: Gisela Dash RD        Task: Provide education on low blood glucose - causes, signs/symptoms, prevention, treatment, carrying a carbohydrate source at all times, and medical identification Completed 1/4/2024   Responsible User: Gisela Dash RD        Task: Provide education on safe travel with diabetes    Responsible User: Gisela Dash RD        Task: Provide education on how to care for diabetes on sick days    Responsible User: Gisela Dash RD        Task: Provide education on when to call a health care provider    Responsible User: Gisela Dash RD        Goal: Reducing Risks - know how to prevent and treat long-term diabetes complications         Task: Provide education on major complications of diabetes, prevention, early diagnostic measures and treatment of complications    Responsible User: Gisela Dash RD        Task: Provide education on recommended care for dental, eye and foot health    Responsible User: Gisela Dash RD        Task: Provide education on Hemoglobin A1c - goals and relationship to blood glucose levels    Responsible User: Gisela Dash RD        Task: Provide education on recommendations for heart health - lipid levels and goals, blood pressure and goals, and aspirin therapy, if  indicated    Responsible User: Gisela Dash RD        Task: Provide education on tobacco cessation    Responsible User: Gisela Dash RD        Goal: Healthy Coping - use available resources to cope with the challenges of managing diabetes         Task: Discuss recognizing feelings about having diabetes    Responsible User: Gisela Dash RD        Task: Provide education on the benefits of making appropriate lifestyle changes    Responsible User: Gisela Dash RD        Task: Provide education on benefits of utilizing support systems    Responsible User: Gisela Dash RD        Task: Discuss methods for coping with stress    Responsible User: Gisela Dash RD        Task: Provide education on when to seek professional counseling    Responsible User: Gisela Dash RD Joy Smetanka RD, LD, [facility]   Certified Diabetes Care and   [facility]     Time Spent: 60 minutes  Encounter Type: Individual    Any diabetes medication dose changes were made via the CDE Protocol per the patient's primary care provider. A copy of this encounter was shared with the provider.

## 2024-01-04 NOTE — LETTER
1/4/2024         RE: Imelda El  [address] [facility] [address]        Dear Colleague,    Thank you for referring your patient, Imelda El, to the [facility]. Please see a copy of my visit note below.    Diabetes Self-Management Education & Support    Type of Service: In Person Visit      ASSESSMENT:  Initial visit for newly diagnosed Type 2 diabetes. Instructed on what is diabetes and how to control it. Reviewed symptoms and treatment of high and low blood sugar. Instructed on general diet guidelines and the importance of weight control and daily activity. Demonstrated how to check blood sugar at home. Provided an Accu-Chek guide me glucometer.  Blood sugar in the office after breakfast was 134.  Imelda made some eating habit changes since learning her blood sugars were elevated.  The only suggestion change I have is to avoid juice and sugar soda.  Patient drinks 1 cup of sugar soda a week while bowling.     Patient's most recent   Lab Results   Component Value Date    A1C 6.8 11/02/2023     is meeting goal of <7.0    Diabetes knowledge and skills assessment:   Patient is knowledgeable in diabetes management concepts related to: Healthy Coping    Continue education with the following diabetes management concepts: Healthy Eating, Being Active, Monitoring, Taking Medication, Problem Solving, and Reducing Risks    Based on learning assessment above, most appropriate setting for further diabetes education would be: Individual setting.      PLAN  Check blood sugar once a day at different times.  Avoid juice and sugar soda.  Increase activity: Aim for 20 minutes most days of the week    Follow-up: 1 month    See Care Plan for co-developed, patient-state behavior change goals.  AVS provided for patient today.    Education Materials Provided:  [facility] Understanding Diabetes Booklet  Low carb snack ideas  American diabetes Association A1c to  "EAG    SUBJECTIVE/OBJECTIVE:  Presents for: Initial Assessment for new diagnosis  Accompanied by: Self  Diabetes education in the past 24mo: No  Focus of Visit: Healthy Eating  Diabetes type: Type 2  Date of diagnosis: 11/2/23  How confident are you filling out medical forms by yourself:: Quite a bit  Diabetes management related comments/concerns: food choices  Transportation concerns: No  Other concerns:: None  Cultural Influences/Ethnic Background:  Not  or     Diabetes Symptoms & Complications:  Fatigue: Sometimes  Neuropathy: No  Polydipsia: No  Polyphagia: No  Polyuria: No  Visual change: No  Slow healing wounds: No  Symptom course: Stable  Weight trend: Decreasing  Complications assessed today?: Yes  Autonomic neuropathy: No  CVA: No  Heart disease: No  Nephropathy: No  Peripheral neuropathy: No  Peripheral Vascular Disease: No  Retinopathy: No  Sexual dysfunction: No    Patient Problem List and Family Medical History reviewed for relevant medical history, current medical status, and diabetes risk factors.    Vitals:  Wt 110.7 kg (244 lb)   LMP  (LMP Unknown)   BMI 38.44 kg/m    Estimated body mass index is 38.44 kg/m  as calculated from the following:    Height as of 12/1/23: 1.697 m (5' 6.8\").    Weight as of this encounter: 110.7 kg (244 lb).   Last 3 BP:   BP Readings from Last 3 Encounters:   12/01/23 130/66   11/02/23 122/80   09/14/22 122/70       History   Smoking Status     Never   Smokeless Tobacco     Never       Labs:  Lab Results   Component Value Date    A1C 6.8 11/02/2023     Lab Results   Component Value Date     11/02/2023     08/30/2021     Lab Results   Component Value Date     11/02/2023     Direct Measure HDL   Date Value Ref Range Status   11/02/2023 55 >=50 mg/dL Final   ]  GFR Estimate   Date Value Ref Range Status   11/02/2023 74 >60 mL/min/1.73m2 Final   09/28/2020 >60 >60 mL/min/1.73m2 Final     GFR Estimate If Black   Date Value Ref Range Status " "  09/28/2020 >60 >60 mL/min/1.73m2 Final     Lab Results   Component Value Date    CR 0.83 11/02/2023     No results found for: \"MICROALBUMIN\"    Healthy Eating:  Healthy Eating Assessed Today: Yes  Cultural/Anglican diet restrictions?: No  Meal planning/habits: Avoiding sweets, Smaller portions  How many times a week on average do you eat food made away from home (restaurant/take-out)?: 1  Meals include: Breakfast, Lunch, Dinner  Breakfast: 7:00 am 5 oz greek yogurt an 8 oz OJ, sometimes tea  Lunch: 11-11:30 am sandwich on wheat bread or home made soup or leftovers, coffee with coffee mate  Dinner: 5:30-6:00 pm tuna hotdish with pasta or meatloaf or steak with vegetables. Potato soup with leeks and ham. water  Snacks: Rarely eats after dinner. Sometimes Cheddar Smartpop popcorn  Other: drinks a lot of water during the day. 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 Avoiding sweets most of the time. May have 1 cookie or small serving of a dessert occasionally.  Beverages: Water, Tea, Coffee, Juice  Has patient met with a dietitian in the past?: No    Being Active:  Being Active Assessed Today: Yes (household chores, bowling twice per week)  Exercise:: Yes (Has a older Norditrack. The room is not ready for exercise yet.)  How intense was your typical exercise? : Light (like stretching or slow walking)  Barrier to exercise: None    Monitoring:  Blood Glucose Meter: Accu-chek      Taking Medications:  Current Treatments: None    Problem Solving:  Problem Solving Assessed Today: Yes  Is the patient at risk for hypoglycemia?: No  Is the patient at risk for DKA?: No    Reducing Risks:  Reducing Risks Assessed Today: Yes  Diabetes Risks: Age over 45 years, Hyperlipidemia, Sedentary Lifestyle, Family History  CAD Risks: Diabetes Mellitus, Dyslipidemia, Hypertension, Obesity, Post-menopausal, Sedentary lifestyle  Has dilated eye exam at least once a year?: Yes  Sees dentist every 6 months?: Yes  Feet checked by healthcare " provider in the last year?: No    Healthy Coping:  Healthy Coping Assessed Today: Yes  Emotional response to diabetes: Ready to learn  Informal Support system:: Children, Family, Friends, Spouse  Stage of change: PREPARATION (Decided to change - considering how)  Support resources: None  Patient Activation Measure Survey Score:       No data to display              Care Plan and Education Provided:  Care Plan: Diabetes   Updates made by Gisela Dash RD since 1/4/2024 12:00 AM        Problem: HbA1C Not In Goal         Goal: Establish Regular Follow-Ups with PCP         Task: Discuss with PCP the recommended timing for patient's next follow up visit(s)    Responsible User: Gisela Dash RD        Task: Discuss schedule for PCP visits with patient    Responsible User: Gisela Dash RD        Goal: Get HbA1C Level in Goal         Task: Educate patient on diabetes education self-management topics    Responsible User: Gisela Dash RD        Task: Educate patient on benefits of regular glucose monitoring Completed 1/4/2024   Responsible User: Gisela Dash RD        Task: Refer patient to appropriate extended care team member, as needed (Medication Therapy Management, Behavioral Health, Physical Therapy, etc.)    Responsible User: Gisela Dash RD        Task: Discuss diabetes treatment plan with patient    Responsible User: Gisela Dash RD        Problem: Diabetes Self-Management Education Needed to Optimize Self-Care Behaviors         Goal: Understand diabetes pathophysiology and disease progression         Task: Provide education on diabetes pathophysiology and disease progression specfic to patient's diabetes type Completed 1/4/2024   Responsible User: Gisela Dash RD        Goal: Healthy Eating - follow a healthy eating pattern for diabetes         Task: Provide education on portion control and consistency in amount, composition and timing of food intake    Responsible User: Gisela Dash  OLGA MARTINEZ        Task: Provide education on managing carbohydrate intake (carbohydrate counting, plate planning method, etc.)    Responsible User: Gisela Dash RD        Task: Provide education on weight management    Responsible User: Gisela Dash RD        Task: Provide education on heart healthy eating    Responsible User: Gisela Dash RD        Task: Provide education on eating out    Responsible User: Gisela Dash RD        Task: Develop individualized healthy eating plan with patient Completed 1/4/2024   Responsible User: Gisela Dash RD        Goal: Being Active - get regular physical activity, working up to at least 150 minutes per week         Task: Provide education on relationship of activity to glucose and precautions to take if at risk for low glucose Completed 1/4/2024   Responsible User: Gisela Dash RD        Task: Discuss barriers to physical activity with patient Completed 1/4/2024   Responsible User: Gisela Dash RD        Task: Develop physical activity plan with patient    Responsible User: Gisela Dash RD        Task: Explore community resources including walking groups, assistance programs, and home videos    Responsible User: Gisela Dash RD        Goal: Monitoring - monitor glucose and ketones as directed    This Visit's Progress: 0%        Task: Provide education on blood glucose monitoring (purpose, proper technique, frequency, glucose targets, interpreting results, when to use glucose control solution, sharps disposal) Completed 1/4/2024   Responsible User: Gisela Dash RD        Task: Provide education on continuous glucose monitoring (sensor placement, use of zenobia or /reader, understanding glucose trends, alerts and alarms, differences between sensor glucose and blood glucose)    Responsible User: Gisela Dash RD        Task: Provide education on ketone monitoring (when to monitor, frequency, etc.)    Responsible User: Gisela Dash RD         Goal: Taking Medication - patient is consistently taking medications as directed         Task: Provide education on action of prescribed medication, including when to take and possible side effects    Responsible User: Gisela Dash RD        Task: Provide education on insulin and injectable diabetes medications, including administration, storage, site selection and rotation for injection sites    Responsible User: Gisela Dash RD        Task: Discuss barriers to medication adherence with patient and provide management technique ideas as appropriate    Responsible User: Gisela Dash RD        Task: Provide education on frequency and refill details of medications    Responsible User: Gisela Dash RD        Goal: Problem Solving - know how to prevent and manage short-term diabetes complications         Task: Provide education on high blood glucose - causes, signs/symptoms, prevention and treatment Completed 1/4/2024   Responsible User: Gisela Dash RD        Task: Provide education on low blood glucose - causes, signs/symptoms, prevention, treatment, carrying a carbohydrate source at all times, and medical identification Completed 1/4/2024   Responsible User: Gisela Dash RD        Task: Provide education on safe travel with diabetes    Responsible User: Gisela Dash RD        Task: Provide education on how to care for diabetes on sick days    Responsible User: Gisela Dash RD        Task: Provide education on when to call a health care provider    Responsible User: Gisela Dash RD        Goal: Reducing Risks - know how to prevent and treat long-term diabetes complications         Task: Provide education on major complications of diabetes, prevention, early diagnostic measures and treatment of complications    Responsible User: Gisela Dash RD        Task: Provide education on recommended care for dental, eye and foot health    Responsible User: Gisela Dash RD         Task: Provide education on Hemoglobin A1c - goals and relationship to blood glucose levels    Responsible User: Gisela Dash RD        Task: Provide education on recommendations for heart health - lipid levels and goals, blood pressure and goals, and aspirin therapy, if indicated    Responsible User: Gisela Dash RD        Task: Provide education on tobacco cessation    Responsible User: Gisela Dash RD        Goal: Healthy Coping - use available resources to cope with the challenges of managing diabetes         Task: Discuss recognizing feelings about having diabetes    Responsible User: Gisela Dash RD        Task: Provide education on the benefits of making appropriate lifestyle changes    Responsible User: Gisela Dash RD        Task: Provide education on benefits of utilizing support systems    Responsible User: Gisela Dash RD        Task: Discuss methods for coping with stress    Responsible User: Gisela Dash RD        Task: Provide education on when to seek professional counseling    Responsible User: Gisela Dash RD Joy Smetanka RD, LD, [facility]   Certified Diabetes Care and   [facility]     Time Spent: 60 minutes  Encounter Type: Individual    Any diabetes medication dose changes were made via the CDE Protocol per the patient's primary care provider. A copy of this encounter was shared with the provider.

## 2024-02-06 ENCOUNTER — ALLIED HEALTH/NURSE VISIT (OUTPATIENT)
Dept: EDUCATION SERVICES | Facility: CLINIC | Age: 74
End: 2024-02-06
Payer: COMMERCIAL

## 2024-02-06 VITALS — WEIGHT: 244 LBS | BODY MASS INDEX: 38.44 KG/M2

## 2024-02-06 DIAGNOSIS — E11.9 TYPE 2 DIABETES MELLITUS WITHOUT COMPLICATION, WITHOUT LONG-TERM CURRENT USE OF INSULIN (H): Primary | ICD-10-CM

## 2024-02-06 PROCEDURE — G0108 DIAB MANAGE TRN  PER INDIV: HCPCS | Performed by: REGISTERED NURSE

## 2024-02-06 RX ORDER — LANCETS
100 EACH MISCELLANEOUS DAILY
Qty: 100 EACH | Refills: 4 | Status: SHIPPED | OUTPATIENT
Start: 2024-02-06

## 2024-02-06 NOTE — PATIENT INSTRUCTIONS
Continue to check blood sugar daily. You can check twice one day then none the next day. Try pairing up before bedtime to the next morning to see if your blood sugar is going up overnight. Another common pairing is before and 2 hours after the largest meal.  Continue to limit added sugars to 24 grams or less per day.  Continue to stay active every day.   Continue to drink water to stay hydrated and protect your kidneys.  Next diabetes check 3/1/24 with Dr. Tavares.  Follow-up in April 2024.     Blood sugar goals:  Before meals   2 hours after meals: less 180  Bedtime:     A1c: less than 7.0% (estimated average blood sugar of 154 mg/dl)

## 2024-02-06 NOTE — LETTER
2/6/2024         RE: Imelda El  [address]  [facility] [address]        Dear Colleague,    Thank you for referring your patient, Imelda El, to the [facility]. Please see a copy of my visit note below.    Diabetes Self-Management Education & Support    Type of Service: In Person Visit    ASSESSMENT:  1 month follow-up for diabetes education and counseling.  Imelda is checking her blood sugar once per day.  The 30-day blood sugar average is 136.  Imelda is limiting her added sugar and avoiding sugary drinks.  Patient has increased activity.  Weight stable.  Insurance prefers Contour brand glucometer.  Provided a Contour next EZ meter with instruction.    Patient's most recent   Lab Results   Component Value Date    A1C 6.8 11/02/2023     is meeting goal of <7.0    Diabetes knowledge and skills assessment:   Patient is knowledgeable in diabetes management concepts related to: Healthy Eating, Being Active, Monitoring, and Healthy Coping    Continue education with the following diabetes management concepts: Problem Solving, Reducing Risks, and blood sugar interpretation    Based on learning assessment above, most appropriate setting for further diabetes education would be: Individual setting.      PLAN  Continue to check blood sugar daily. You can check twice one day then none the next day. Try pairing up before bedtime to the next morning to see if your blood sugar is going up overnight. Another common pairing is before and 2 hours after the largest meal.  Continue to limit added sugars to 24 grams or less per day.  Continue to stay active every day.   Continue to drink water to stay hydrated and protect your kidneys.  Next diabetes check 3/1/24 with Dr. Tavares.  Follow-up in April 2024.     See Care Plan for co-developed, patient-state behavior change goals.  AVS provided for patient today.    Education Materials Provided:  Contour Next EZ meter kit  100 Microlet  "lancets    SUBJECTIVE/OBJECTIVE:  Retired   Accompanied by: Self  Diabetes education in the past 24mo: Yes  Focus of Visit: Healthy Eating, Monitoring, Problem Solving  Diabetes type: Type 2  Date of diagnosis: 11/2/23  Disease course: Improving  How confident are you filling out medical forms by yourself:: Quite a bit  Diabetes management related comments/concerns: Insurance prefers the Contour brand meter  Transportation concerns: No  Difficulty affording diabetes testing supplies?: No  Other concerns:: None  Cultural Influences/Ethnic Background:  Not  or     Diabetes Symptoms & Complications:  Diabetes Related Symptoms: None  Weight trend: Stable  Symptom course: Improving  Disease course: Improving  Complications assessed today?: Yes  Autonomic neuropathy: No  CVA: No  Heart disease: No  Nephropathy: No  Peripheral neuropathy: No  Peripheral Vascular Disease: No  Retinopathy: No  Sexual dysfunction: No    Patient Problem List and Family Medical History reviewed for relevant medical history, current medical status, and diabetes risk factors.    Vitals:  Wt 110.7 kg (244 lb)   LMP  (LMP Unknown)   BMI 38.44 kg/m    Estimated body mass index is 38.44 kg/m  as calculated from the following:    Height as of 12/1/23: 1.697 m (5' 6.8\").    Weight as of this encounter: 110.7 kg (244 lb).   Last 3 BP:   BP Readings from Last 3 Encounters:   12/01/23 130/66   11/02/23 122/80   09/14/22 122/70       History   Smoking Status     Never   Smokeless Tobacco     Never       Labs:  Lab Results   Component Value Date    A1C 6.8 11/02/2023     Lab Results   Component Value Date     11/02/2023     08/30/2021     Lab Results   Component Value Date     11/02/2023     Direct Measure HDL   Date Value Ref Range Status   11/02/2023 55 >=50 mg/dL Final   ]  GFR Estimate   Date Value Ref Range Status   11/02/2023 74 >60 mL/min/1.73m2 Final   09/28/2020 >60 >60 mL/min/1.73m2 Final     GFR " "Estimate If Black   Date Value Ref Range Status   09/28/2020 >60 >60 mL/min/1.73m2 Final     Lab Results   Component Value Date    CR 0.83 11/02/2023     No results found for: \"MICROALBUMIN\"    Healthy Eating:  Healthy Eating Assessed Today: Yes  Cultural/Gnosticism diet restrictions?: No  Meal planning/habits: Avoiding sweets, Smaller portions  Who cooks/prepares meals for you?: Self  Who purchases food in  your home?: Self  How many times a week on average do you eat food made away from home (restaurant/take-out)?: 1  Meals include: Breakfast, Lunch, Dinner  Breakfast: 7:00 AM greek yogurt or 2 slices of whole wheat toast , 4 oz watered down juice. black tea with splenda  Lunch: 11-11:30 am sandwich on wheat bread or home made soup or leftovers, coffee with coffee mate  Dinner: 5:30-6:00 pm tuna hotdish with pasta or meatloaf or steak with vegetables. Potato soup with leeks and ham. water  Snacks: Rarely eats after dinner. Sometimes Cheddar Smartpop popcorn  Other: drinks a lot of water during the day. 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 Avoiding sweets most of the time. May have 1 cookie or small serving of a dessert occasionally.  Beverages: Water, Tea, Coffee, Juice  Has patient met with a dietitian in the past?: Yes    Being Active:  Being Active Assessed Today: Yes (household chores, bowling twice per week)  Exercise:: Yes (Has a older Norditrack. The room is not ready for exercise yet.)  Days per week of moderate to strenuous exercise (like a brisk walk): 5 (walking and bowling 1-2 times per week)  On average, minutes per day of exercise at this level: 10  How intense was your typical exercise? : Moderate (like brisk walking)  Exercise Minutes per Week: 50  Barrier to exercise: None    Monitoring:  Monitoring Assessed Today: Yes  Did patient bring glucose meter to appointment? : Yes  Blood Glucose Meter: ContourNext  Times checking blood sugar at home (number): 1  Times checking blood sugar at home (per): " Day  Blood glucose trend: Decreasing    Date Breakfast  Lunch  Dinner  Bedtime    Before After Before After Before After    1/15         156       1/16          135       1/17      110           1/18     122           1/19     125           1/20             142   1/21         193       1/22 136         1/23   112  122     1/24 132         1/25    175      1/26     135     1/27       124   1/28   97       1/29   177       1/30 132         1/31 147         2/1   105       2/2   170       2/3   137       2/4  76   102           Taking Medications: N/A  Current Treatments: None    Problem Solving:  Problem Solving Assessed Today: Yes  Is the patient at risk for hypoglycemia?: No  Patient carries a carbohydrate source: No  Is the patient at risk for DKA?: No    Hypoglycemia Symptoms  Hypoglycemia: None    Hypoglycemia Complications  Hypoglycemia Complications: None    Reducing Risks:  Reducing Risks Assessed Today: Yes  Diabetes Risks: Age over 45 years, Hyperlipidemia, Sedentary Lifestyle, Family History  CAD Risks: Diabetes Mellitus, Dyslipidemia, Hypertension, Obesity, Post-menopausal, Sedentary lifestyle  Has dilated eye exam at least once a year?: Yes  Sees dentist every 6 months?: Yes  Feet checked by healthcare provider in the last year?: No    Healthy Coping:  Healthy Coping Assessed Today: Yes  Emotional response to diabetes: Ready to learn  Informal Support system:: Children, Family, Friends, Spouse  Stage of change: ACTION (Actively working towards change)  Support resources: None  Patient Activation Measure Survey Score:       No data to display              Care Plan and Education Provided:  Care Plan: Diabetes   Updates made by Gisela Dash RD since 2/6/2024 12:00 AM        Problem: Diabetes Self-Management Education Needed to Optimize Self-Care Behaviors         Goal: Being Active - get regular physical activity, working up to at least 150 minutes per week    This Visit's Progress: 100%        Goal:  Monitoring - monitor glucose and ketones as directed    This Visit's Progress: 100%   Recent Progress: 0%        Goal: Healthy Coping - use available resources to cope with the challenges of managing diabetes         Task: Provide education on the benefits of making appropriate lifestyle changes Completed 2/6/2024   Responsible User: Gisela Dash RD Joy Smetanka RD, LD, [facility]   Certified Diabetes Care and   [facility]     Time Spent: 60 minutes  Encounter Type: Individual    Any diabetes medication dose changes were made via the CDE Protocol per the patient's primary care provider. A copy of this encounter was shared with the provider.

## 2024-02-06 NOTE — PROGRESS NOTES
Diabetes Self-Management Education & Support    Type of Service: In Person Visit    ASSESSMENT:  1 month follow-up for diabetes education and counseling.  Imelda is checking her blood sugar once per day.  The 30-day blood sugar average is 136.  Imelda is limiting her added sugar and avoiding sugary drinks.  Patient has increased activity.  Weight stable.  Insurance prefers Contour brand glucometer.  Provided a Contour next EZ meter with instruction.    Patient's most recent   Lab Results   Component Value Date    A1C 6.8 11/02/2023     is meeting goal of <7.0    Diabetes knowledge and skills assessment:   Patient is knowledgeable in diabetes management concepts related to: Healthy Eating, Being Active, Monitoring, and Healthy Coping    Continue education with the following diabetes management concepts: Problem Solving, Reducing Risks, and blood sugar interpretation    Based on learning assessment above, most appropriate setting for further diabetes education would be: Individual setting.      PLAN  Continue to check blood sugar daily. You can check twice one day then none the next day. Try pairing up before bedtime to the next morning to see if your blood sugar is going up overnight. Another common pairing is before and 2 hours after the largest meal.  Continue to limit added sugars to 24 grams or less per day.  Continue to stay active every day.   Continue to drink water to stay hydrated and protect your kidneys.  Next diabetes check 3/1/24 with Dr. Tavares.  Follow-up in April 2024.     See Care Plan for co-developed, patient-state behavior change goals.  AVS provided for patient today.    Education Materials Provided:  Contour Next EZ meter kit  100 Microlet lancets    SUBJECTIVE/OBJECTIVE:  Retired   Accompanied by: Self  Diabetes education in the past 24mo: Yes  Focus of Visit: Healthy Eating, Monitoring, Problem Solving  Diabetes type: Type 2  Date of diagnosis: 11/2/23  Disease course: Improving  How  "confident are you filling out medical forms by yourself:: Quite a bit  Diabetes management related comments/concerns: Insurance prefers the Contour brand meter  Transportation concerns: No  Difficulty affording diabetes testing supplies?: No  Other concerns:: None  Cultural Influences/Ethnic Background:  Not  or     Diabetes Symptoms & Complications:  Diabetes Related Symptoms: None  Weight trend: Stable  Symptom course: Improving  Disease course: Improving  Complications assessed today?: Yes  Autonomic neuropathy: No  CVA: No  Heart disease: No  Nephropathy: No  Peripheral neuropathy: No  Peripheral Vascular Disease: No  Retinopathy: No  Sexual dysfunction: No    Patient Problem List and Family Medical History reviewed for relevant medical history, current medical status, and diabetes risk factors.    Vitals:  Wt 110.7 kg (244 lb)   LMP  (LMP Unknown)   BMI 38.44 kg/m    Estimated body mass index is 38.44 kg/m  as calculated from the following:    Height as of 12/1/23: 1.697 m (5' 6.8\").    Weight as of this encounter: 110.7 kg (244 lb).   Last 3 BP:   BP Readings from Last 3 Encounters:   12/01/23 130/66   11/02/23 122/80   09/14/22 122/70       History   Smoking Status    Never   Smokeless Tobacco    Never       Labs:  Lab Results   Component Value Date    A1C 6.8 11/02/2023     Lab Results   Component Value Date     11/02/2023     08/30/2021     Lab Results   Component Value Date     11/02/2023     Direct Measure HDL   Date Value Ref Range Status   11/02/2023 55 >=50 mg/dL Final   ]  GFR Estimate   Date Value Ref Range Status   11/02/2023 74 >60 mL/min/1.73m2 Final   09/28/2020 >60 >60 mL/min/1.73m2 Final     GFR Estimate If Black   Date Value Ref Range Status   09/28/2020 >60 >60 mL/min/1.73m2 Final     Lab Results   Component Value Date    CR 0.83 11/02/2023     No results found for: \"MICROALBUMIN\"    Healthy Eating:  Healthy Eating Assessed Today: Yes  Cultural/[place] " diet restrictions?: No  Meal planning/habits: Avoiding sweets, Smaller portions  Who cooks/prepares meals for you?: Self  Who purchases food in  your home?: Self  How many times a week on average do you eat food made away from home (restaurant/take-out)?: 1  Meals include: Breakfast, Lunch, Dinner  Breakfast: 7:00 AM greek yogurt or 2 slices of whole wheat toast , 4 oz watered down juice. black tea with splenda  Lunch: 11-11:30 am sandwich on wheat bread or home made soup or leftovers, coffee with coffee mate  Dinner: 5:30-6:00 pm tuna hotdish with pasta or meatloaf or steak with vegetables. Potato soup with leeks and ham. water  Snacks: Rarely eats after dinner. Sometimes Cheddar Smartpop popcorn  Other: drinks a lot of water during the day. 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 Avoiding sweets most of the time. May have 1 cookie or small serving of a dessert occasionally.  Beverages: Water, Tea, Coffee, Juice  Has patient met with a dietitian in the past?: Yes    Being Active:  Being Active Assessed Today: Yes (household chores, bowling twice per week)  Exercise:: Yes (Has a older Norditrack. The room is not ready for exercise yet.)  Days per week of moderate to strenuous exercise (like a brisk walk): 5 (walking and bowling 1-2 times per week)  On average, minutes per day of exercise at this level: 10  How intense was your typical exercise? : Moderate (like brisk walking)  Exercise Minutes per Week: 50  Barrier to exercise: None    Monitoring:  Monitoring Assessed Today: Yes  Did patient bring glucose meter to appointment? : Yes  Blood Glucose Meter: ContourNext  Times checking blood sugar at home (number): 1  Times checking blood sugar at home (per): Day  Blood glucose trend: Decreasing    Date Breakfast  Lunch  Dinner  Bedtime    Before After Before After Before After    1/15         156       1/16          135       1/17      110           1/18     122           1/19     125           1/20             142    1/21         193       1/22 136         1/23   112  122     1/24 132         1/25    175      1/26     135     1/27       124   1/28   97       1/29   177       1/30 132         1/31 147         2/1   105       2/2   170       2/3   137       2/4  76   102           Taking Medications: N/A  Current Treatments: None    Problem Solving:  Problem Solving Assessed Today: Yes  Is the patient at risk for hypoglycemia?: No  Patient carries a carbohydrate source: No  Is the patient at risk for DKA?: No    Hypoglycemia Symptoms  Hypoglycemia: None    Hypoglycemia Complications  Hypoglycemia Complications: None    Reducing Risks:  Reducing Risks Assessed Today: Yes  Diabetes Risks: Age over 45 years, Hyperlipidemia, Sedentary Lifestyle, Family History  CAD Risks: Diabetes Mellitus, Dyslipidemia, Hypertension, Obesity, Post-menopausal, Sedentary lifestyle  Has dilated eye exam at least once a year?: Yes  Sees dentist every 6 months?: Yes  Feet checked by healthcare provider in the last year?: No    Healthy Coping:  Healthy Coping Assessed Today: Yes  Emotional response to diabetes: Ready to learn  Informal Support system:: Children, Family, Friends, Spouse  Stage of change: ACTION (Actively working towards change)  Support resources: None  Patient Activation Measure Survey Score:       No data to display              Care Plan and Education Provided:  Care Plan: Diabetes   Updates made by Gisela Dash RD since 2/6/2024 12:00 AM        Problem: Diabetes Self-Management Education Needed to Optimize Self-Care Behaviors         Goal: Being Active - get regular physical activity, working up to at least 150 minutes per week    This Visit's Progress: 100%        Goal: Monitoring - monitor glucose and ketones as directed    This Visit's Progress: 100%   Recent Progress: 0%        Goal: Healthy Coping - use available resources to cope with the challenges of managing diabetes         Task: Provide education on the benefits of  making appropriate lifestyle changes Completed 2/6/2024   Responsible User: Gisela Dash RD Joy Smetanka RD, LD, [facility] [place]   Certified Diabetes Care and   [facility]     Time Spent: 60 minutes  Encounter Type: Individual    Any diabetes medication dose changes were made via the CDE Protocol per the patient's primary care provider. A copy of this encounter was shared with the provider.

## 2024-02-23 ENCOUNTER — PATIENT OUTREACH (OUTPATIENT)
Dept: GASTROENTEROLOGY | Facility: CLINIC | Age: 74
End: 2024-02-23
Payer: COMMERCIAL

## 2024-03-01 ENCOUNTER — OFFICE VISIT (OUTPATIENT)
Dept: FAMILY MEDICINE | Facility: CLINIC | Age: 74
End: 2024-03-01
Payer: COMMERCIAL

## 2024-03-01 VITALS
DIASTOLIC BLOOD PRESSURE: 64 MMHG | HEIGHT: 67 IN | RESPIRATION RATE: 16 BRPM | BODY MASS INDEX: 37.83 KG/M2 | SYSTOLIC BLOOD PRESSURE: 130 MMHG | WEIGHT: 241 LBS | TEMPERATURE: 97.8 F | OXYGEN SATURATION: 97 % | HEART RATE: 76 BPM

## 2024-03-01 DIAGNOSIS — E66.01 MORBID OBESITY (H): ICD-10-CM

## 2024-03-01 DIAGNOSIS — I10 ESSENTIAL HYPERTENSION, BENIGN: ICD-10-CM

## 2024-03-01 DIAGNOSIS — E11.9 TYPE 2 DIABETES MELLITUS WITHOUT COMPLICATION, WITHOUT LONG-TERM CURRENT USE OF INSULIN (H): Primary | ICD-10-CM

## 2024-03-01 LAB — HBA1C MFR BLD: 6.9 % (ref 0–5.6)

## 2024-03-01 PROCEDURE — 99214 OFFICE O/P EST MOD 30 MIN: CPT | Performed by: FAMILY MEDICINE

## 2024-03-01 PROCEDURE — 36415 COLL VENOUS BLD VENIPUNCTURE: CPT | Performed by: FAMILY MEDICINE

## 2024-03-01 PROCEDURE — 83036 HEMOGLOBIN GLYCOSYLATED A1C: CPT | Performed by: FAMILY MEDICINE

## 2024-03-01 PROCEDURE — 82043 UR ALBUMIN QUANTITATIVE: CPT | Performed by: FAMILY MEDICINE

## 2024-03-01 PROCEDURE — 82570 ASSAY OF URINE CREATININE: CPT | Performed by: FAMILY MEDICINE

## 2024-03-01 RX ORDER — FAMOTIDINE 20 MG
TABLET ORAL
COMMUNITY

## 2024-03-01 RX ORDER — GINSENG 100 MG
CAPSULE ORAL
COMMUNITY
End: 2024-05-23

## 2024-03-01 NOTE — PROGRESS NOTES
Assessment & Plan     (E11.9) Type 2 diabetes mellitus without complication, without long-term current use of insulin (H)  (primary encounter diagnosis)  Comment: A1c 6.9 diabetes good control. She is doing well with diet control and monitor bs at home: usually < 140 fasting.   Plan: Albumin Random Urine Quantitative with Creat         Ratio, HEMOGLOBIN A1C        Continue current medication. Diet control, exercise, foot care and eye care. Follow-up in 6 month.     (E66.01) Morbid obesity (H)  Comment: bmi 37 and is improving with diet control and exercise  Plan: continue diet control and regular exercise    (I10) Benign Essential Hypertension  Comment: bp reasonable control. She dose check bp sometimes and < 140/90. She takes medication as instructed and no side effect.   Plan: continue current medication and close monitor bp at home. If bp is <100/60 she will hold the medication. Medication side effect addressed. Advise to push potassium rich foot such as banana etc.          See Patient Instructions    Subjective   Imelda is a 73 year old, presenting for the following health issues:  Diabetes      3/1/2024     8:18 AM   Additional Questions   Roomed by Saira     History of Present Illness       Diabetes:   She presents for follow up of diabetes.  She is checking home blood glucose one time daily.   She checks blood glucose before meals, after meals, before and after meals and at bedtime.  Blood glucose is sometimes over 200 and never under 70. She is aware of hypoglycemia symptoms including none.    She has no concerns regarding her diabetes at this time.   She is not experiencing numbness or burning in feet, excessive thirst, blurry vision, weight changes or redness, sores or blisters on feet.           She eats 0-1 servings of fruits and vegetables daily.She consumes 1 sweetened beverage(s) daily.She exercises with enough effort to increase her heart rate 10 to 19 minutes per day.  She exercises with enough  "effort to increase her heart rate 4 days per week.   She is taking medications regularly.         Hypertension Follow-up    Do you check your blood pressure regularly outside of the clinic? Yes   Are you following a low salt diet? Yes  Are your blood pressures ever more than 140 on the top number (systolic) OR more   than 90 on the bottom number (diastolic), for example 140/90? No         Review of Systems  Constitutional, HEENT, cardiovascular, pulmonary, gi and gu systems are negative, except as otherwise noted.      Objective    /64   Pulse 76   Temp 97.8  F (36.6  C)   Resp 16   Ht 1.697 m (5' 6.8\")   Wt 109.3 kg (241 lb)   LMP  (LMP Unknown)   SpO2 97%   BMI 37.97 kg/m    Body mass index is 38.44 kg/m .  Physical Exam   GENERAL: alert and no distress  NECK: no adenopathy, no asymmetry, masses, or scars  RESP: lungs clear to auscultation - no rales, rhonchi or wheezes  CV: regular rate and rhythm, normal S1 S2, no S3 or S4, no murmur, click or rub, no peripheral edema  ABDOMEN: soft, nontender, no hepatosplenomegaly, no masses and bowel sounds normal  MS: no gross musculoskeletal defects noted, no edema       Signed Electronically by: Johanny Tavares MD    "

## 2024-03-02 LAB
CREAT UR-MCNC: 185 MG/DL
MICROALBUMIN UR-MCNC: <12 MG/L
MICROALBUMIN/CREAT UR: NORMAL MG/G{CREAT}

## 2024-03-19 ENCOUNTER — MYC MEDICAL ADVICE (OUTPATIENT)
Dept: FAMILY MEDICINE | Facility: CLINIC | Age: 74
End: 2024-03-19
Payer: COMMERCIAL

## 2024-03-19 DIAGNOSIS — J45.40 MODERATE PERSISTENT ASTHMA WITHOUT COMPLICATION: Primary | ICD-10-CM

## 2024-03-19 NOTE — TELEPHONE ENCOUNTER
Taken off med list as not used medications.   Pt asking for Asmanex(mometasone) 220 mcg and Albuterol 108mcg inhalers be put back on med list and sent to pharmacy for when she needs a refill.     Last ACT 10/29/23: score 23    Visit 11/2/23  (J45.31) Mild persistent asthma with acute exacerbation  Comment: doing well with inhalers. No cough, wheezing, sob.   Plan: albuterol (PROAIR HFA/PROVENTIL HFA/VENTOLIN         HFA) 108 (90 Base) MCG/ACT inhaler, mometasone         (ASMANEX, 60 METERED DOSES,) 220 MCG/ACT         inhaler        Continue current inhalers

## 2024-03-20 RX ORDER — ALBUTEROL SULFATE 90 UG/1
2 AEROSOL, METERED RESPIRATORY (INHALATION) EVERY 6 HOURS PRN
Status: SHIPPED
Start: 2024-03-20 | End: 2024-05-16

## 2024-04-02 ENCOUNTER — MYC MEDICAL ADVICE (OUTPATIENT)
Dept: FAMILY MEDICINE | Facility: CLINIC | Age: 74
End: 2024-04-02
Payer: COMMERCIAL

## 2024-04-09 ENCOUNTER — ALLIED HEALTH/NURSE VISIT (OUTPATIENT)
Dept: EDUCATION SERVICES | Facility: CLINIC | Age: 74
End: 2024-04-09
Payer: COMMERCIAL

## 2024-04-09 VITALS — BODY MASS INDEX: 38.44 KG/M2 | WEIGHT: 244 LBS

## 2024-04-09 DIAGNOSIS — E11.9 TYPE 2 DIABETES MELLITUS WITHOUT COMPLICATION, WITHOUT LONG-TERM CURRENT USE OF INSULIN (H): Primary | ICD-10-CM

## 2024-04-09 PROCEDURE — G0108 DIAB MANAGE TRN  PER INDIV: HCPCS | Performed by: REGISTERED NURSE

## 2024-04-09 NOTE — PROGRESS NOTES
Diabetes Self-Management Education & Support    Type of Service: In Person Visit    ASSESSMENT:  2-month follow-up for diabetes education and counseling.  Imelda is checking her blood sugar once per day.  The 30-day blood sugar average is 112.  Patient is working hard to manage her blood sugars and weight.  She is gradually increasing her activity.  Completed education topics regarding preventing diabetes complications.    Patient's most recent   Lab Results   Component Value Date    A1C 6.9 03/01/2024     is meeting goal of <7.0    Diabetes knowledge and skills assessment:   Patient is knowledgeable in diabetes management concepts related to: Healthy Eating, Being Active, Monitoring, Taking Medication, Problem Solving, Reducing Risks, and Healthy Coping    Continue education with the following diabetes management concepts: Assistance making lifestyle changes    Based on learning assessment above, most appropriate setting for further diabetes education would be: Individual setting.      PLAN  Continue to check blood sugar once per day.  Continue to stay active. Try not to sit for more than 1 hour at a time.  Continue to eat smaller amounts more often.  Continue to drink a lot of water to stay hydrated and protect your kidneys.  Follow-up in 2 months.    See Care Plan for co-developed, patient-state behavior change goals.  AVS provided for patient today.    Education Materials Provided:  Preventing diabetes complications: Tests and exams  Footcare  Sick day guidelines    SUBJECTIVE/OBJECTIVE:  Presents for: Follow-up  Accompanied by: Self  Diabetes education in the past 24mo: Yes  Focus of Visit: Reducing Risks, Problem Solving, Assistance w/ making life changes  Diabetes type: Type 2  Date of diagnosis: 11/2/23  Disease course: Improving  How confident are you filling out medical forms by yourself:: Quite a bit  Diabetes management related comments/concerns: none  Transportation concerns: No  Difficulty affording  "diabetes testing supplies?: No  Other concerns:: None  Cultural Influences/Ethnic Background:  Not  or     Diabetes Symptoms & Complications:  Weight trend: Stable  Symptom course: Improving  Disease course: Improving  Complications assessed today?: Yes  Autonomic neuropathy: No  CVA: No  Heart disease: No  Nephropathy: No  Peripheral neuropathy: No  Peripheral Vascular Disease: No  Retinopathy: No  Sexual dysfunction: No    Patient Problem List and Family Medical History reviewed for relevant medical history, current medical status, and diabetes risk factors.    Vitals:  Wt 110.7 kg (244 lb)   LMP  (LMP Unknown)   BMI 38.44 kg/m    Estimated body mass index is 38.44 kg/m  as calculated from the following:    Height as of 3/1/24: 1.697 m (5' 6.8\").    Weight as of this encounter: 110.7 kg (244 lb).   Last 3 BP:   BP Readings from Last 3 Encounters:   03/01/24 130/64   12/01/23 130/66   11/02/23 122/80       History   Smoking Status    Never   Smokeless Tobacco    Never       Labs:  Lab Results   Component Value Date    A1C 6.9 03/01/2024     Lab Results   Component Value Date     11/02/2023     08/30/2021     Lab Results   Component Value Date     11/02/2023     Direct Measure HDL   Date Value Ref Range Status   11/02/2023 55 >=50 mg/dL Final   ]  GFR Estimate   Date Value Ref Range Status   11/02/2023 74 >60 mL/min/1.73m2 Final   09/28/2020 >60 >60 mL/min/1.73m2 Final     GFR Estimate If Black   Date Value Ref Range Status   09/28/2020 >60 >60 mL/min/1.73m2 Final     Lab Results   Component Value Date    CR 0.83 11/02/2023     No results found for: \"MICROALBUMIN\"    Healthy Eating:  Healthy Eating Assessed Today: Yes  Cultural/Jew diet restrictions?: No  Do you have any food allergies or intolerances?: No  Meal planning/habits: Avoiding sweets, Smaller portions, Heart healthy, Other  Please elaborate:: High fiber  Who cooks/prepares meals for you?: Self  Who purchases food " in  your home?: Self  How many times a week on average do you eat food made away from home (restaurant/take-out)?: 1  Meals include: Breakfast, Lunch, Dinner  Breakfast: 7:00 am greek yogurt or 2 slices of whole wheat toast, 4 oz watered down juice. black tea with splenda  Lunch: 11-11:30 am sandwich on wheat bread or home made soup or leftovers, coffee with coffee mate  Dinner: 5:30-6:00 pm salmon casserole or bratwurst or steak and potatoes. Uses an air fryer. water  Snacks: Rarely eats after dinner. Sometimes almonds or Smartpop popcorn  Other: drinks a lot of water during the day. 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 Avoiding sweets most of the time.  Beverages: Water, Tea, Coffee, Juice  Has patient met with a dietitian in the past?: Yes    Being Active:  Being Active Assessed Today: Yes (household chores, bowling twice per week)  Exercise:: Yes (Has a older Norditrack. The room is not ready for exercise yet.)  Days per week of moderate to strenuous exercise (like a brisk walk): 5 (walking and bowling 1-2 times per week)  On average, minutes per day of exercise at this level: 20 (15 minutes)  How intense was your typical exercise? : Moderate (like brisk walking)  Exercise Minutes per Week: 100  Barrier to exercise: None    Monitoring:  Monitoring Assessed Today: Yes  Did patient bring glucose meter to appointment? : Yes  Blood Glucose Meter: ContourNext  Times checking blood sugar at home (number): 1  Times checking blood sugar at home (per): Day  Blood glucose trend: Decreasing    Date Breakfast  Lunch  Dinner  Bedtime    Before After Before After Before After    4/1     123           4/2    112           4/3 126               4/4        110        4/5         113       4/6    152           4/7     120           4/8 139             Taking Medications: N/A  Current Treatments: None    Problem Solving:  Problem Solving Assessed Today: Yes  Is the patient at risk for hypoglycemia?: No  Is the patient at risk for  DKA?: No    Hypoglycemia Symptoms  Hypoglycemia: None    Hypoglycemia Complications  Hypoglycemia Complications: None    Reducing Risks:  Reducing Risks Assessed Today: Yes  Diabetes Risks: Age over 45 years, Hyperlipidemia, Sedentary Lifestyle, Family History  CAD Risks: Diabetes Mellitus, Dyslipidemia, Hypertension, Obesity, Post-menopausal, Sedentary lifestyle  Has dilated eye exam at least once a year?: Yes  Sees dentist every 6 months?: Yes  Feet checked by healthcare provider in the last year?: Yes    Healthy Coping:  Healthy Coping Assessed Today: Yes  Emotional response to diabetes: Ready to learn  Informal Support system:: Children, Family, Friends, Spouse  Stage of change: ACTION (Actively working towards change)  Support resources: None  Patient Activation Measure Survey Score:       No data to display              Care Plan and Education Provided:  Care Plan: Diabetes   Updates made by Gisela Dash RD since 4/9/2024 12:00 AM        Problem: HbA1C Not In Goal         Goal: Get HbA1C Level in Goal         Task: Educate patient on diabetes education self-management topics Completed 4/9/2024   Responsible User: Gisela Dash RD        Problem: Diabetes Self-Management Education Needed to Optimize Self-Care Behaviors         Goal: Being Active - get regular physical activity, working up to at least 150 minutes per week    This Visit's Progress: 100%   Recent Progress: 100%        Goal: Monitoring - monitor glucose and ketones as directed    This Visit's Progress: 100%   Recent Progress: 100%        Goal: Problem Solving - know how to prevent and manage short-term diabetes complications         Task: Provide education on how to care for diabetes on sick days Completed 4/9/2024   Responsible User: Gisela Dash RD        Goal: Reducing Risks - know how to prevent and treat long-term diabetes complications         Task: Provide education on major complications of diabetes, prevention, early diagnostic  measures and treatment of complications Completed 4/9/2024   Responsible User: Gisela Dash RD        Task: Provide education on recommended care for dental, eye and foot health Completed 4/9/2024   Responsible User: Gisela Dash RD        Task: Provide education on Hemoglobin A1c - goals and relationship to blood glucose levels Completed 4/9/2024   Responsible User: Gisela Dash RD        Task: Provide education on recommendations for heart health - lipid levels and goals, blood pressure and goals, and aspirin therapy, if indicated Completed 4/9/2024   Responsible User: Gisela Dash RD Joy Smetanka RD, LD, [facility]   Certified Diabetes Care and   [facility] Tuesdays and Thursdays  [facility] Wednesdays-virtual visits only    Time Spent: 60 minutes  Encounter Type: Individual    Any diabetes medication dose changes were made via the CDE Protocol per the patient's primary care provider. A copy of this encounter was shared with the provider.

## 2024-04-09 NOTE — LETTER
4/9/2024         RE: Imelda El  [address]  [facility] [address]        Dear Colleague,    Thank you for referring your patient, Imelda El, to the [facility]. Please see a copy of my visit note below.    Diabetes Self-Management Education & Support    Type of Service: In Person Visit    ASSESSMENT:  2-month follow-up for diabetes education and counseling.  Imelda is checking her blood sugar once per day.  The 30-day blood sugar average is 112.  Patient is working hard to manage her blood sugars and weight.  She is gradually increasing her activity.  Completed education topics regarding preventing diabetes complications.    Patient's most recent   Lab Results   Component Value Date    A1C 6.9 03/01/2024     is meeting goal of <7.0    Diabetes knowledge and skills assessment:   Patient is knowledgeable in diabetes management concepts related to: Healthy Eating, Being Active, Monitoring, Taking Medication, Problem Solving, Reducing Risks, and Healthy Coping    Continue education with the following diabetes management concepts: Assistance making lifestyle changes    Based on learning assessment above, most appropriate setting for further diabetes education would be: Individual setting.      PLAN  Continue to check blood sugar once per day.  Continue to stay active. Try not to sit for more than 1 hour at a time.  Continue to eat smaller amounts more often.  Continue to drink a lot of water to stay hydrated and protect your kidneys.  Follow-up in 2 months.    See Care Plan for co-developed, patient-state behavior change goals.  AVS provided for patient today.    Education Materials Provided:  Preventing diabetes complications: Tests and exams  Footcare  Sick day guidelines    SUBJECTIVE/OBJECTIVE:  Presents for: Follow-up  Accompanied by: Self  Diabetes education in the past 24mo: Yes  Focus of Visit: Reducing Risks, Problem Solving, Assistance w/ making life  "changes  Diabetes type: Type 2  Date of diagnosis: 11/2/23  Disease course: Improving  How confident are you filling out medical forms by yourself:: Quite a bit  Diabetes management related comments/concerns: none  Transportation concerns: No  Difficulty affording diabetes testing supplies?: No  Other concerns:: None  Cultural Influences/Ethnic Background:  Not  or     Diabetes Symptoms & Complications:  Weight trend: Stable  Symptom course: Improving  Disease course: Improving  Complications assessed today?: Yes  Autonomic neuropathy: No  CVA: No  Heart disease: No  Nephropathy: No  Peripheral neuropathy: No  Peripheral Vascular Disease: No  Retinopathy: No  Sexual dysfunction: No    Patient Problem List and Family Medical History reviewed for relevant medical history, current medical status, and diabetes risk factors.    Vitals:  Wt 110.7 kg (244 lb)   LMP  (LMP Unknown)   BMI 38.44 kg/m    Estimated body mass index is 38.44 kg/m  as calculated from the following:    Height as of 3/1/24: 1.697 m (5' 6.8\").    Weight as of this encounter: 110.7 kg (244 lb).   Last 3 BP:   BP Readings from Last 3 Encounters:   03/01/24 130/64   12/01/23 130/66   11/02/23 122/80       History   Smoking Status     Never   Smokeless Tobacco     Never       Labs:  Lab Results   Component Value Date    A1C 6.9 03/01/2024     Lab Results   Component Value Date     11/02/2023     08/30/2021     Lab Results   Component Value Date     11/02/2023     Direct Measure HDL   Date Value Ref Range Status   11/02/2023 55 >=50 mg/dL Final   ]  GFR Estimate   Date Value Ref Range Status   11/02/2023 74 >60 mL/min/1.73m2 Final   09/28/2020 >60 >60 mL/min/1.73m2 Final     GFR Estimate If Black   Date Value Ref Range Status   09/28/2020 >60 >60 mL/min/1.73m2 Final     Lab Results   Component Value Date    CR 0.83 11/02/2023     No results found for: \"MICROALBUMIN\"    Healthy Eating:  Healthy Eating Assessed Today: " Yes  Cultural/Scientology diet restrictions?: No  Do you have any food allergies or intolerances?: No  Meal planning/habits: Avoiding sweets, Smaller portions, Heart healthy, Other  Please elaborate:: High fiber  Who cooks/prepares meals for you?: Self  Who purchases food in  your home?: Self  How many times a week on average do you eat food made away from home (restaurant/take-out)?: 1  Meals include: Breakfast, Lunch, Dinner  Breakfast: 7:00 am greek yogurt or 2 slices of whole wheat toast, 4 oz watered down juice. black tea with splenda  Lunch: 11-11:30 am sandwich on wheat bread or home made soup or leftovers, coffee with coffee mate  Dinner: 5:30-6:00 pm salmon casserole or bratwurst or steak and potatoes. Uses an air fryer. water  Snacks: Rarely eats after dinner. Sometimes almonds or Smartpop popcorn  Other: drinks a lot of water during the day. 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 Avoiding sweets most of the time.  Beverages: Water, Tea, Coffee, Juice  Has patient met with a dietitian in the past?: Yes    Being Active:  Being Active Assessed Today: Yes (household chores, bowling twice per week)  Exercise:: Yes (Has a older Norditrack. The room is not ready for exercise yet.)  Days per week of moderate to strenuous exercise (like a brisk walk): 5 (walking and bowling 1-2 times per week)  On average, minutes per day of exercise at this level: 20 (15 minutes)  How intense was your typical exercise? : Moderate (like brisk walking)  Exercise Minutes per Week: 100  Barrier to exercise: None    Monitoring:  Monitoring Assessed Today: Yes  Did patient bring glucose meter to appointment? : Yes  Blood Glucose Meter: ContourNext  Times checking blood sugar at home (number): 1  Times checking blood sugar at home (per): Day  Blood glucose trend: Decreasing    Date Breakfast  Lunch  Dinner  Bedtime    Before After Before After Before After    4/1     123           4/2    112           4/3 126               4/4        110         4/5         113       4/6    152           4/7     120           4/8 139             Taking Medications: N/A  Current Treatments: None    Problem Solving:  Problem Solving Assessed Today: Yes  Is the patient at risk for hypoglycemia?: No  Is the patient at risk for DKA?: No    Hypoglycemia Symptoms  Hypoglycemia: None    Hypoglycemia Complications  Hypoglycemia Complications: None    Reducing Risks:  Reducing Risks Assessed Today: Yes  Diabetes Risks: Age over 45 years, Hyperlipidemia, Sedentary Lifestyle, Family History  CAD Risks: Diabetes Mellitus, Dyslipidemia, Hypertension, Obesity, Post-menopausal, Sedentary lifestyle  Has dilated eye exam at least once a year?: Yes  Sees dentist every 6 months?: Yes  Feet checked by healthcare provider in the last year?: Yes    Healthy Coping:  Healthy Coping Assessed Today: Yes  Emotional response to diabetes: Ready to learn  Informal Support system:: Children, Family, Friends, Spouse  Stage of change: ACTION (Actively working towards change)  Support resources: None  Patient Activation Measure Survey Score:       No data to display              Care Plan and Education Provided:  Care Plan: Diabetes   Updates made by Gisela Dash RD since 4/9/2024 12:00 AM        Problem: HbA1C Not In Goal         Goal: Get HbA1C Level in Goal         Task: Educate patient on diabetes education self-management topics Completed 4/9/2024   Responsible User: Gisela Dash RD        Problem: Diabetes Self-Management Education Needed to Optimize Self-Care Behaviors         Goal: Being Active - get regular physical activity, working up to at least 150 minutes per week    This Visit's Progress: 100%   Recent Progress: 100%        Goal: Monitoring - monitor glucose and ketones as directed    This Visit's Progress: 100%   Recent Progress: 100%        Goal: Problem Solving - know how to prevent and manage short-term diabetes complications         Task: Provide education on how to care for  diabetes on sick days Completed 4/9/2024   Responsible User: Gisela Dash RD        Goal: Reducing Risks - know how to prevent and treat long-term diabetes complications         Task: Provide education on major complications of diabetes, prevention, early diagnostic measures and treatment of complications Completed 4/9/2024   Responsible User: Gisela Dash RD        Task: Provide education on recommended care for dental, eye and foot health Completed 4/9/2024   Responsible User: Gisela Dash RD        Task: Provide education on Hemoglobin A1c - goals and relationship to blood glucose levels Completed 4/9/2024   Responsible User: Gisela Dash RD        Task: Provide education on recommendations for heart health - lipid levels and goals, blood pressure and goals, and aspirin therapy, if indicated Completed 4/9/2024   Responsible User: Gisela Dash RD Joy Smetanka RD, LD, [facility]   Certified Diabetes Care and   [facility] Tuesdays and Thursdays  [facility] Wednesdays-virtual visits only    Time Spent: 60 minutes  Encounter Type: Individual    Any diabetes medication dose changes were made via the CDE Protocol per the patient's primary care provider. A copy of this encounter was shared with the provider.

## 2024-04-09 NOTE — PATIENT INSTRUCTIONS
Continue to check blood sugar once per day.  Continue to stay active. Try not to sit for more than 1 hour at a time.  Continue to eat smaller amounts more often.  Continue to drink a lot of water to stay hydrated and protect your kidneys.  Follow-up in 2 months.    Blood sugar goals:  Before meals   2 hours after meals: less 180  Bedtime:     A1c: less than 7.0% (estimated average blood sugar of 154 mg/dl)

## 2024-05-16 DIAGNOSIS — J45.40 MODERATE PERSISTENT ASTHMA WITHOUT COMPLICATION: ICD-10-CM

## 2024-05-16 NOTE — TELEPHONE ENCOUNTER
Refill Request  Medication name: Pending Prescriptions:                       Disp   Refills    albuterol (PROAIR HFA/PROVENTIL HFA/BECK*18 g                Sig: Inhale 2 puffs into the lungs every 6 hours as           needed for shortness of breath, wheezing or cough    mometasone (ASMANEX TWISTHALER) 220 MCG/A*                    Sig: Inhale 1 puff into the lungs 2 times daily    Requested Pharmacy:  [facility], [place] - [place], [place] - [address]

## 2024-05-17 RX ORDER — ALBUTEROL SULFATE 90 UG/1
2 AEROSOL, METERED RESPIRATORY (INHALATION) EVERY 6 HOURS PRN
Qty: 18 G | Refills: 0 | Status: SHIPPED | OUTPATIENT
Start: 2024-05-17 | End: 2024-05-23

## 2024-05-17 NOTE — TELEPHONE ENCOUNTER
Please inform pt that she Needs to see doctor for further refill. I refilled for one month to bridge her.    Johanny Tavares MD on 5/17/2024 at 8:01 AM ;

## 2024-05-20 ASSESSMENT — ASTHMA QUESTIONNAIRES
QUESTION_4 LAST FOUR WEEKS HOW OFTEN HAVE YOU USED YOUR RESCUE INHALER OR NEBULIZER MEDICATION (SUCH AS ALBUTEROL): ONCE A WEEK OR LESS
QUESTION_2 LAST FOUR WEEKS HOW OFTEN HAVE YOU HAD SHORTNESS OF BREATH: ONCE OR TWICE A WEEK
QUESTION_1 LAST FOUR WEEKS HOW MUCH OF THE TIME DID YOUR ASTHMA KEEP YOU FROM GETTING AS MUCH DONE AT WORK, SCHOOL OR AT HOME: NONE OF THE TIME
QUESTION_3 LAST FOUR WEEKS HOW OFTEN DID YOUR ASTHMA SYMPTOMS (WHEEZING, COUGHING, SHORTNESS OF BREATH, CHEST TIGHTNESS OR PAIN) WAKE YOU UP AT NIGHT OR EARLIER THAN USUAL IN THE MORNING: ONCE OR TWICE
QUESTION_5 LAST FOUR WEEKS HOW WOULD YOU RATE YOUR ASTHMA CONTROL: WELL CONTROLLED
ACT_TOTALSCORE: 21
ACT_TOTALSCORE: 21

## 2024-05-23 ENCOUNTER — OFFICE VISIT (OUTPATIENT)
Dept: FAMILY MEDICINE | Facility: CLINIC | Age: 74
End: 2024-05-23
Payer: COMMERCIAL

## 2024-05-23 VITALS
WEIGHT: 242 LBS | HEIGHT: 66 IN | SYSTOLIC BLOOD PRESSURE: 130 MMHG | HEART RATE: 74 BPM | RESPIRATION RATE: 16 BRPM | BODY MASS INDEX: 38.89 KG/M2 | TEMPERATURE: 97.9 F | DIASTOLIC BLOOD PRESSURE: 70 MMHG | OXYGEN SATURATION: 95 %

## 2024-05-23 DIAGNOSIS — I10 ESSENTIAL HYPERTENSION, BENIGN: ICD-10-CM

## 2024-05-23 DIAGNOSIS — E11.9 TYPE 2 DIABETES MELLITUS WITHOUT COMPLICATION, WITHOUT LONG-TERM CURRENT USE OF INSULIN (H): Primary | ICD-10-CM

## 2024-05-23 DIAGNOSIS — J45.40 MODERATE PERSISTENT ASTHMA WITHOUT COMPLICATION: ICD-10-CM

## 2024-05-23 PROCEDURE — 99214 OFFICE O/P EST MOD 30 MIN: CPT | Performed by: FAMILY MEDICINE

## 2024-05-23 PROCEDURE — G2211 COMPLEX E/M VISIT ADD ON: HCPCS | Performed by: FAMILY MEDICINE

## 2024-05-23 RX ORDER — ALBUTEROL SULFATE 90 UG/1
2 AEROSOL, METERED RESPIRATORY (INHALATION) EVERY 6 HOURS PRN
Qty: 18 G | Refills: 3 | Status: SHIPPED | OUTPATIENT
Start: 2024-05-23

## 2024-05-23 RX ORDER — CALCIUM CARBONATE/VITAMIN D3 500-10/5ML
1 LIQUID (ML) ORAL DAILY
COMMUNITY

## 2024-05-23 NOTE — PROGRESS NOTES
"  Assessment & Plan     (E11.9) Type 2 diabetes mellitus without complication, without long-term current use of insulin (H)  (primary encounter diagnosis)  Comment: good controlled. Doing well with medication. Bs at home around 100-130. Occasionally up to 180 after meal.   Plan: continue current plan, diet control, exercise, eye care and foot care addressed.     (J45.40) Moderate persistent asthma without complication  Comment: pt doing ok with inhalers. She needs albuterol prn. ATC score 21. Denies fever, cough, wheezing and sob   Plan: albuterol (PROAIR HFA/PROVENTIL HFA/VENTOLIN         HFA) 108 (90 Base) MCG/ACT inhaler        Continue current medication.     (I10) Benign Essential Hypertension  Comment: bp reasonable control. She is asymptomatic. She dose not check bp at home. She has the machine at home. Non smoker.   Plan: advise check bp at home 1-2 times a day. If bp is < 100/60 she will hold the medication.     The longitudinal plan of care for the diagnosis(es)/condition(s) as documented were addressed during this visit. Due to the added complexity in care, I will continue to support Imelda in the subsequent management and with ongoing continuity of care.            BMI  Estimated body mass index is 39.06 kg/m  as calculated from the following:    Height as of this encounter: 1.676 m (5' 6\").    Weight as of this encounter: 109.8 kg (242 lb).             Subjective   Imelda is a 73 year old, presenting for the following health issues:  Medication Update        5/23/2024     2:35 PM   Additional Questions   Roomed by Gen TOO CMA     History of Present Illness     Asthma:  She presents for follow up of asthma.  She has no cough, no wheezing, and no shortness of breath.  She is using a relief medication a few times a month. She does not miss any doses of her controller medication throughout the week. Patient is aware of the following triggers: pollens and strong odors and fumes. The patient has not had a visit " "to the Emergency Room, Urgent Care or Hospital due to asthma since the last clinic visit.     She eats 2-3 servings of fruits and vegetables daily.She consumes 0 sweetened beverage(s) daily.She exercises with enough effort to increase her heart rate 10 to 19 minutes per day.  She exercises with enough effort to increase her heart rate 4 days per week.   She is taking medications regularly.         Diabetes Follow-up    How often are you checking your blood sugar? One time daily  What time of day are you checking your blood sugars (select all that apply)?  Before and after meals  Have you had any blood sugars above 200?  No  Have you had any blood sugars below 70?  No  What symptoms do you notice when your blood sugar is low?  None  What concerns do you have today about your diabetes? None   Do you have any of these symptoms? (Select all that apply)  No numbness or tingling in feet.  No redness, sores or blisters on feet.  No complaints of excessive thirst.  No reports of blurry vision.  No significant changes to weight.      BP Readings from Last 2 Encounters:   05/23/24 130/70   03/01/24 130/64     Hemoglobin A1C (%)   Date Value   03/01/2024 6.9 (H)   11/02/2023 6.8 (H)     LDL Cholesterol Calculated (mg/dL)   Date Value   11/02/2023 101 (H)   09/14/2022 82             Hypertension Follow-up    Do you check your blood pressure regularly outside of the clinic? No   Are you following a low salt diet? Yes  Are your blood pressures ever more than 140 on the top number (systolic) OR more   than 90 on the bottom number (diastolic), for example 140/90? No       Review of Systems  Constitutional, HEENT, cardiovascular, pulmonary, gi and gu systems are negative, except as otherwise noted.      Objective    BP (!) 144/70 (BP Location: Right arm, Patient Position: Sitting, Cuff Size: Adult Regular)   Pulse 74   Temp 97.9  F (36.6  C) (Oral)   Resp 16   Ht 1.676 m (5' 6\")   Wt 109.8 kg (242 lb)   LMP  (LMP Unknown)   " SpO2 95%   BMI 39.06 kg/m    Body mass index is 39.06 kg/m .  Physical Exam   GENERAL: alert and no distress  NECK: no adenopathy, no asymmetry, masses, or scars  RESP: lungs clear to auscultation - no rales, rhonchi or wheezes  CV: regular rate and rhythm, normal S1 S2, no S3 or S4, no murmur, click or rub, no peripheral edema  ABDOMEN: soft, nontender, no hepatosplenomegaly, no masses and bowel sounds normal  MS: no gross musculoskeletal defects noted, no edema            Signed Electronically by: Johanny Tavares MD

## 2024-06-11 ENCOUNTER — ALLIED HEALTH/NURSE VISIT (OUTPATIENT)
Dept: EDUCATION SERVICES | Facility: CLINIC | Age: 74
End: 2024-06-11
Payer: COMMERCIAL

## 2024-06-11 VITALS — WEIGHT: 240 LBS | BODY MASS INDEX: 38.74 KG/M2

## 2024-06-11 DIAGNOSIS — E11.9 TYPE 2 DIABETES MELLITUS WITHOUT COMPLICATION, WITHOUT LONG-TERM CURRENT USE OF INSULIN (H): Primary | ICD-10-CM

## 2024-06-11 PROCEDURE — 97802 MEDICAL NUTRITION INDIV IN: CPT | Performed by: REGISTERED NURSE

## 2024-06-11 NOTE — PROGRESS NOTES
Diabetes Self-Management Education & Support    Type of Service: In Person Visit    ASSESSMENT:  2-month follow-up for diabetes education and counseling.  Imelda is checking her blood sugar once per day.  Majority of blood sugars are within target.  She is staying active in the garden and the yard every day.  Patient states she is frustrated her weight is plateaued at 240 pounds.  Imelda will participate in a 4-month study where she eats a plant-based diet starting mid July.  The goal will be to decrease weight and cholesterol.    Patient's most recent   Lab Results   Component Value Date    A1C 6.9 03/01/2024     is meeting goal of <7.0    Diabetes knowledge and skills assessment:   Patient is knowledgeable in diabetes management concepts related to: Healthy Eating, Being Active, Monitoring, Taking Medication, Problem Solving, Reducing Risks, and Healthy Coping    Continue education with the following diabetes management concepts: Assistance making lifestyle changes    Based on learning assessment above, most appropriate setting for further diabetes education would be: Individual setting.      PLAN  Continue to check blood sugar once per day at different times.  Continue to stay active every day.  Continue to drink a lot of water to stay hydrated and protect your kidneys.  Continue to weigh regularly to monitor progress.  Next diabetes education visit at the end of August after being on the plant-based diet for a few weeks.    See Care Plan for co-developed, patient-state behavior change goals.  AVS provided for patient today.    Education Materials Provided:  No new materials provided today      SUBJECTIVE/OBJECTIVE:  Presents for: Follow-up  Accompanied by: Self  Diabetes education in the past 24mo: Yes  Focus of Visit: Assistance w/ making life changes, Problem Solving  Diabetes type: Type 2  Date of diagnosis: 11/2/23  Disease course: Improving  How confident are you filling out medical forms by yourself:: Quite  "a bit  Diabetes management related comments/concerns: none  Transportation concerns: No  Difficulty affording diabetes testing supplies?: No  Other concerns:: None  Cultural Influences/Ethnic Background:  Not  or     Diabetes Symptoms & Complications:  Diabetes Related Symptoms: None  Weight trend: Stable  Symptom course: Stable  Disease course: Improving  Complications assessed today?: Yes  Autonomic neuropathy: No  CVA: No  Heart disease: No  Nephropathy: No  Peripheral neuropathy: No  Peripheral Vascular Disease: No  Retinopathy: No  Sexual dysfunction: No    Patient Problem List and Family Medical History reviewed for relevant medical history, current medical status, and diabetes risk factors.    Vitals:  Wt 108.9 kg (240 lb)   LMP  (LMP Unknown)   BMI 38.74 kg/m    Estimated body mass index is 38.74 kg/m  as calculated from the following:    Height as of 5/23/24: 1.676 m (5' 6\").    Weight as of this encounter: 108.9 kg (240 lb).     Wt Readings from Last 3 Encounters:   06/11/24 108.9 kg (240 lb)   05/23/24 109.8 kg (242 lb)   04/09/24 110.7 kg (244 lb)     BG LOG  Date Breakfast  Lunch  Dinner  Bedtime    Before After Before After Before After    6/10 141                6/9     127           6/8     117           6/7     104           6/6     193           6/5     110           6/4         100          Last 3 BP:   BP Readings from Last 3 Encounters:   05/23/24 130/70   03/01/24 130/64   12/01/23 130/66       History   Smoking Status    Never   Smokeless Tobacco    Never       Labs:  Lab Results   Component Value Date    A1C 6.9 03/01/2024     Lab Results   Component Value Date     11/02/2023     08/30/2021     Lab Results   Component Value Date     11/02/2023     Direct Measure HDL   Date Value Ref Range Status   11/02/2023 55 >=50 mg/dL Final   ]  GFR Estimate   Date Value Ref Range Status   11/02/2023 74 >60 mL/min/1.73m2 Final   09/28/2020 >60 >60 mL/min/1.73m2 Final " "    GFR Estimate If Black   Date Value Ref Range Status   09/28/2020 >60 >60 mL/min/1.73m2 Final     Lab Results   Component Value Date    CR 0.83 11/02/2023     No results found for: \"MICROALBUMIN\"    Healthy Eating:  Healthy Eating Assessed Today: Yes  Cultural/Christian diet restrictions?: No  Do you have any food allergies or intolerances?: No  Meal planning/habits: Avoiding sweets, Smaller portions, Heart healthy, Other  Please elaborate:: High fiber  Who cooks/prepares meals for you?: Self  Who purchases food in  your home?: Self  How many times a week on average do you eat food made away from home (restaurant/take-out)?: 1  Meals include: Breakfast, Lunch, Dinner, Afternoon Snack  Breakfast: 7:00 am greek yogur with berries, sun tea with splenda.  Lunch: 11:30 am grilled cheese sandwich on wheat bread or home made soup or leftovers, coffee with coffee mate  Dinner: 5:30-6:00 pm steak, veggies, baked sweet potato.  Uses an air fryer. water  Snacks: Rarely eats after dinner. Sometimes almonds or Smartpop popcorn  Other: drinks a lot of water during the day. 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 Avoiding sweets most of the time.  Beverages: Water, Tea, Coffee  Has patient met with a dietitian in the past?: Yes    Being Active:  Being Active Assessed Today: Yes (household chores, bowling twice per week)  Exercise:: Yes (gardening, weeding, mowing the law,)  Days per week of moderate to strenuous exercise (like a brisk walk): 5 (walking and bowling 1-2 times per week)  On average, minutes per day of exercise at this level: 20 (15 minutes)  Exercise Minutes per Week: 100  Barrier to exercise: None    Monitoring:  Monitoring Assessed Today: Yes  Did patient bring glucose meter to appointment? : Yes  Blood Glucose Meter: ContourNext  Times checking blood sugar at home (number): 1  Times checking blood sugar at home (per): Day  Blood glucose trend: Decreasing    Taking Medications:  Current Treatments: Diet    Problem " Solving:  Problem Solving Assessed Today: Yes  Is the patient at risk for hypoglycemia?: No  Is the patient at risk for DKA?: No    Hypoglycemia Symptoms  Hypoglycemia: None    Hypoglycemia Complications  Hypoglycemia Complications: None    Reducing Risks:  Reducing Risks Assessed Today: Yes  Diabetes Risks: Age over 45 years, Hyperlipidemia, Sedentary Lifestyle, Family History  CAD Risks: Diabetes Mellitus, Dyslipidemia, Hypertension, Obesity, Post-menopausal, Sedentary lifestyle  Has dilated eye exam at least once a year?: Yes  Sees dentist every 6 months?: Yes  Feet checked by healthcare provider in the last year?: Yes    Healthy Coping:  Healthy Coping Assessed Today: Yes  Emotional response to diabetes: Ready to learn  Informal Support system:: Children, Family, Friends, Spouse  Stage of change: ACTION (Actively working towards change)  Support resources: None  Patient Activation Measure Survey Score:       No data to display              Care Plan and Education Provided:  Care Plan: Diabetes   Updates made by Gisela Dash RD since 6/11/2024 12:00 AM        Problem: Diabetes Self-Management Education Needed to Optimize Self-Care Behaviors         Goal: Healthy Eating - follow a healthy eating pattern for diabetes         Task: Provide education on weight management Completed 6/11/2024   Responsible User: Gisela Dash RD        Goal: Being Active - get regular physical activity, working up to at least 150 minutes per week    This Visit's Progress: 100%   Recent Progress: 100%        Goal: Monitoring - monitor glucose and ketones as directed    This Visit's Progress: 100%   Recent Progress: 100%            Gisela Dash RD, LD, [facility]   Certified Diabetes Care and   [facility] Tuesdays and Thursdays  [facility] Wednesdays-virtual visits only    Time Spent: 45 minutes  Encounter Type: Individual    Any diabetes medication dose changes were  made via the CDE Protocol per the patient's primary care provider. A copy of this encounter was shared with the provider.

## 2024-06-11 NOTE — PATIENT INSTRUCTIONS
Continue to check blood sugar once per day at different times.  Continue to stay active every day.  Continue to drink a lot of water to stay hydrated and protect your kidneys.  Continue to weigh regularly to monitor progress.  Next diabetes education visit at the end of August after being on the plant-based diet for a few weeks.    Blood sugar goals:  Before meals   2 hours after meals: less 180  Bedtime:     A1c: less than 7.0% (estimated average blood sugar of 154 mg/dl)

## 2024-06-11 NOTE — LETTER
6/11/2024         RE: Imelda El  [address] [place] [id number]        Dear Colleague,    Thank you for referring your patient, Imelda El, to the [facility]. Please see a copy of my visit note below.    No notes on file

## 2024-07-27 ENCOUNTER — HEALTH MAINTENANCE LETTER (OUTPATIENT)
Age: 74
End: 2024-07-27

## 2024-08-29 ENCOUNTER — ALLIED HEALTH/NURSE VISIT (OUTPATIENT)
Dept: EDUCATION SERVICES | Facility: CLINIC | Age: 74
End: 2024-08-29
Payer: COMMERCIAL

## 2024-08-29 VITALS — WEIGHT: 233 LBS | BODY MASS INDEX: 37.61 KG/M2

## 2024-08-29 DIAGNOSIS — E11.9 TYPE 2 DIABETES MELLITUS WITHOUT COMPLICATION, WITHOUT LONG-TERM CURRENT USE OF INSULIN (H): Primary | ICD-10-CM

## 2024-08-29 PROCEDURE — 97803 MED NUTRITION INDIV SUBSEQ: CPT | Performed by: REGISTERED NURSE

## 2024-08-29 NOTE — PROGRESS NOTES
Diabetes Self-Management Education & Support  Type of Service: In Person Visit      ASSESSMENT:  2-month follow-up for diabetes education and counseling.  Imelda has been eating a plant-based, high-fiber, dairy free, low-fat diet for 6 weeks.  She bought 5 cookbooks and is trying a variety of new recipes. This eating plan does not limit carbohydrates.  The majority of blood sugars are within goal.  Patient lost 7 pounds in the past 6 weeks.  She plans on continuing the plant-based eating study until the middle of November.    BG LOG  Date Breakfast  Lunch  Dinner  Bedtime    Before After Before After Before After    8/19     121           8/20         95       8/21       154         8/22     104           8/23     100           8/24 124               8/25         145       8/26     89     8/27 117         8/28   253  147         Patient's most recent   Lab Results   Component Value Date    A1C 6.9 03/01/2024     is meeting goal of <7.0    Diabetes knowledge and skills assessment:   Patient is knowledgeable in diabetes management concepts related to: Healthy Eating, Being Active, Monitoring, Taking Medication, Problem Solving, Reducing Risks, and Healthy Coping    Continue education with the following diabetes management concepts: Problem solving    Based on learning assessment above, most appropriate setting for further diabetes education would be: Individual setting.      PLAN:  Continue to check blood sugar once per day at different times.  Continue to eat your current meal plan: Vegan, dairy free, and low-fat.  Continue to stay active every day.  Continue to drink a lot of water to stay hydrated and protect your kidneys.  Follow-up in 2 months.     See Care Plan for co-developed, patient-state behavior change goals.  AVS provided for patient today.    Education Materials Provided:  No new materials provided today    SUBJECTIVE/OBJECTIVE:  Presents for: Follow-up  Accompanied by: Self  Diabetes education in the  "past 24mo: Yes  Focus of Visit: Assistance w/ making life changes, Problem Solving, Healthy Coping  Diabetes type: Type 2  Date of diagnosis: 11/2/23  Disease course: Improving  How confident are you filling out medical forms by yourself:: Quite a bit  Diabetes management related comments/concerns: none  Transportation concerns: No  Difficulty affording diabetes testing supplies?: No  Other concerns:: Glasses  Cultural Influences/Ethnic Background:  Not  or     Diabetes Symptoms & Complications:  Diabetes Related Symptoms: None  Weight trend: Decreasing  Symptom course: Stable  Disease course: Improving  Complications assessed today?: Yes  Autonomic neuropathy: No  CVA: No  Heart disease: No  Nephropathy: No  Peripheral neuropathy: No  Peripheral Vascular Disease: No  Retinopathy: No  Sexual dysfunction: No    Patient Problem List and Family Medical History reviewed for relevant medical history, current medical status, and diabetes risk factors.    Vitals:  Wt 105.7 kg (233 lb)   LMP  (LMP Unknown)   BMI 37.61 kg/m    Estimated body mass index is 37.61 kg/m  as calculated from the following:    Height as of 5/23/24: 1.676 m (5' 6\").    Weight as of this encounter: 105.7 kg (233 lb).     Wt Readings from Last 3 Encounters:   08/29/24 105.7 kg (233 lb)   06/11/24 108.9 kg (240 lb)   05/23/24 109.8 kg (242 lb)      Last 3 BP:   BP Readings from Last 3 Encounters:   05/23/24 130/70   03/01/24 130/64   12/01/23 130/66       History   Smoking Status    Never   Smokeless Tobacco    Never       Labs:  Lab Results   Component Value Date    A1C 6.9 03/01/2024     Lab Results   Component Value Date     11/02/2023     08/30/2021     Lab Results   Component Value Date     11/02/2023     Direct Measure HDL   Date Value Ref Range Status   11/02/2023 55 >=50 mg/dL Final   ]  GFR Estimate   Date Value Ref Range Status   11/02/2023 74 >60 mL/min/1.73m2 Final   09/28/2020 >60 >60 mL/min/1.73m2 " "Final     GFR Estimate If Black   Date Value Ref Range Status   09/28/2020 >60 >60 mL/min/1.73m2 Final     Lab Results   Component Value Date    CR 0.83 11/02/2023     No results found for: \"MICROALBUMIN\"    Healthy Eating:  Healthy Eating Assessed Today: Yes  Cultural/Jainism diet restrictions?: No  Do you have any food allergies or intolerances?: No  Meal planning/habits: Avoiding sweets, Smaller portions, Heart healthy, Other  Please elaborate:: High fiber, vegetarian, dairy-free, low fat  Who cooks/prepares meals for you?: Self  Who purchases food in  your home?: Self  How many times a week on average do you eat food made away from home (restaurant/take-out)?: 1  Meals include: Breakfast, Lunch, Dinner, Afternoon Snack  Breakfast: 7:00 am steel cut oats with berries or carissa and cinnamon or wheat chex or raisin bran or frosted shredded mini wheats.  Lunch: 11:30 am salad with a lot of vegetables and dairy-free, low fat salad dressing. toasted bread with hummus, tomatoes or chick pea salad sandwich or leftovers  Dinner: 5:30-6:00 pm vegetable stir-mcmillan and white rice or tomato rice soup with brown rice or quinoa taco meat, vegan sour cream, beans, lettuce. Morning star hot dogs and dairy-free mashed potatoes with vegetarian gravy or mushroom stroganoff.  Snacks: Pretzel chips from the [employer], rosemary crackers, a lot of fruit (berries, nectarine, apple, melon.  Other: bought 5 vegetarian cookbooks. Using an instapot. Drinks a lot of water during the day. 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 Avoiding sweets most of the time.  Beverages: Water, Tea, Coffee  Has patient met with a dietitian in the past?: Yes    Being Active:  Being Active Assessed Today: Yes (household chores, bowling twice per week)  Exercise:: Yes (gardening, weeding, mowing the law, rudy, grocery shopping and cooking)  Days per week of moderate to strenuous exercise (like a brisk walk): 5 (walking and bowling 1-2 times per week)  On " average, minutes per day of exercise at this level: 20 (15 minutes)  Exercise Minutes per Week: 100  Barrier to exercise: None    Monitoring:  Monitoring Assessed Today: Yes  Did patient bring glucose meter to appointment? : No  Blood Glucose Meter: ContourNext  Times checking blood sugar at home (number): 1  Times checking blood sugar at home (per): Day  Blood glucose trend: Decreasing    Taking Medications: n/a  Current Treatments: Diet    Problem Solving:  Problem Solving Assessed Today: Yes  Is the patient at risk for hypoglycemia?: No  Is the patient at risk for DKA?: No    Hypoglycemia Symptoms  Hypoglycemia: None    Hypoglycemia Complications  Hypoglycemia Complications: None    Reducing Risks:  Reducing Risks Assessed Today: Yes  Diabetes Risks: Age over 45 years, Hyperlipidemia, Sedentary Lifestyle, Family History  CAD Risks: Diabetes Mellitus, Dyslipidemia, Hypertension, Obesity, Post-menopausal, Sedentary lifestyle  Has dilated eye exam at least once a year?: Yes  Sees dentist every 6 months?: Yes  Feet checked by healthcare provider in the last year?: Yes    Healthy Coping:  Healthy Coping Assessed Today: Yes  Emotional response to diabetes: Ready to learn  Informal Support system:: Children, Family, Friends, Spouse  Stage of change: ACTION (Actively working towards change)  Support resources: None  Patient Activation Measure Survey Score:       No data to display                  Care Plan and Education Provided:  Care Plan: Diabetes   Updates made by Gisela Dash RD since 8/29/2024 12:00 AM        Problem: HbA1C Not In Goal         Goal: Get HbA1C Level in Goal         Task: Discuss diabetes treatment plan with patient Completed 8/29/2024   Responsible User: Gisela Dash RD        Problem: Diabetes Self-Management Education Needed to Optimize Self-Care Behaviors         Goal: Healthy Eating - follow a healthy eating pattern for diabetes    This Visit's Progress: 100%        Goal: Being Active -  get regular physical activity, working up to at least 150 minutes per week    This Visit's Progress: 100%   Recent Progress: 100%        Goal: Monitoring - monitor glucose and ketones as directed    This Visit's Progress: 100%   Recent Progress: 100%            Gisela Dash RD, LD, [facility]   Certified Diabetes Care and   [facility] Tuesdays and Thursdays  [facility] Wednesdays-virtual visits only    Time Spent: 60 minutes  Encounter Type: Individual    Any diabetes medication dose changes were made via the CDE Protocol per the patient's primary care provider. A copy of this encounter was shared with the provider.

## 2024-08-29 NOTE — LETTER
8/29/2024         RE: Imelda El  [address] [facility] [place] [address]        Dear Colleague,    Thank you for referring your patient, Imelda El, to the [facility]. Please see a copy of my visit note below.    Diabetes Self-Management Education & Support  Type of Service: In Person Visit      ASSESSMENT:  2-month follow-up for diabetes education and counseling.  Imelda has been eating a plant-based, high-fiber, dairy free, low-fat diet for 6 weeks.  She bought 5 cookbooks and is trying a variety of new recipes. This eating plan does not limit carbohydrates.  The majority of blood sugars are within goal.  Patient lost 7 pounds in the past 6 weeks.  She plans on continuing the plant-based eating study until the middle of November.    BG LOG  Date Breakfast  Lunch  Dinner  Bedtime    Before After Before After Before After    8/19     121           8/20         95       8/21       154         8/22     104           8/23     100           8/24 124               8/25         145       8/26     89     8/27 117         8/28   253  147         Patient's most recent   Lab Results   Component Value Date    A1C 6.9 03/01/2024     is meeting goal of <7.0    Diabetes knowledge and skills assessment:   Patient is knowledgeable in diabetes management concepts related to: Healthy Eating, Being Active, Monitoring, Taking Medication, Problem Solving, Reducing Risks, and Healthy Coping    Continue education with the following diabetes management concepts: Problem solving    Based on learning assessment above, most appropriate setting for further diabetes education would be: Individual setting.      PLAN:  Continue to check blood sugar once per day at different times.  Continue to eat your current meal plan: Vegan, dairy free, and low-fat.  Continue to stay active every day.  Continue to drink a lot of water to stay hydrated and protect your kidneys.  Follow-up in 2 months.     See Care Plan  Duplicate "for co-developed, patient-state behavior change goals.  AVS provided for patient today.    Education Materials Provided:  No new materials provided today    SUBJECTIVE/OBJECTIVE:  Presents for: Follow-up  Accompanied by: Self  Diabetes education in the past 24mo: Yes  Focus of Visit: Assistance w/ making life changes, Problem Solving, Healthy Coping  Diabetes type: Type 2  Date of diagnosis: 11/2/23  Disease course: Improving  How confident are you filling out medical forms by yourself:: Quite a bit  Diabetes management related comments/concerns: none  Transportation concerns: No  Difficulty affording diabetes testing supplies?: No  Other concerns:: Glasses  Cultural Influences/Ethnic Background:  Not  or     Diabetes Symptoms & Complications:  Diabetes Related Symptoms: None  Weight trend: Decreasing  Symptom course: Stable  Disease course: Improving  Complications assessed today?: Yes  Autonomic neuropathy: No  CVA: No  Heart disease: No  Nephropathy: No  Peripheral neuropathy: No  Peripheral Vascular Disease: No  Retinopathy: No  Sexual dysfunction: No    Patient Problem List and Family Medical History reviewed for relevant medical history, current medical status, and diabetes risk factors.    Vitals:  Wt 105.7 kg (233 lb)   LMP  (LMP Unknown)   BMI 37.61 kg/m    Estimated body mass index is 37.61 kg/m  as calculated from the following:    Height as of 5/23/24: 1.676 m (5' 6\").    Weight as of this encounter: 105.7 kg (233 lb).     Wt Readings from Last 3 Encounters:   08/29/24 105.7 kg (233 lb)   06/11/24 108.9 kg (240 lb)   05/23/24 109.8 kg (242 lb)      Last 3 BP:   BP Readings from Last 3 Encounters:   05/23/24 130/70   03/01/24 130/64   12/01/23 130/66       History   Smoking Status     Never   Smokeless Tobacco     Never       Labs:  Lab Results   Component Value Date    A1C 6.9 03/01/2024     Lab Results   Component Value Date     11/02/2023     08/30/2021     Lab Results " "  Component Value Date     11/02/2023     Direct Measure HDL   Date Value Ref Range Status   11/02/2023 55 >=50 mg/dL Final   ]  GFR Estimate   Date Value Ref Range Status   11/02/2023 74 >60 mL/min/1.73m2 Final   09/28/2020 >60 >60 mL/min/1.73m2 Final     GFR Estimate If Black   Date Value Ref Range Status   09/28/2020 >60 >60 mL/min/1.73m2 Final     Lab Results   Component Value Date    CR 0.83 11/02/2023     No results found for: \"MICROALBUMIN\"    Healthy Eating:  Healthy Eating Assessed Today: Yes  Cultural/Latter day diet restrictions?: No  Do you have any food allergies or intolerances?: No  Meal planning/habits: Avoiding sweets, Smaller portions, Heart healthy, Other  Please elaborate:: High fiber, vegetarian, dairy-free, low fat  Who cooks/prepares meals for you?: Self  Who purchases food in  your home?: Self  How many times a week on average do you eat food made away from home (restaurant/take-out)?: 1  Meals include: Breakfast, Lunch, Dinner, Afternoon Snack  Breakfast: 7:00 am steel cut oats with berries or carissa and cinnamon or wheat chex or raisin bran or frosted shredded mini wheats.  Lunch: 11:30 am salad with a lot of vegetables and dairy-free, low fat salad dressing. toasted bread with hummus, tomatoes or chick pea salad sandwich or leftovers  Dinner: 5:30-6:00 pm vegetable stir-mcmillan and white rice or tomato rice soup with brown rice or quinoa taco meat, vegan sour cream, beans, lettuce. Morning star hot dogs and dairy-free mashed potatoes with vegetarian gravy or mushroom stroganoff.  Snacks: Pretzel chips from the [employer], rosemary crackers, a lot of fruit (berries, nectarine, apple, melon.  Other: bought 5 vegetarian cookbooks. Using an instapot. Drinks a lot of water during the day. 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 Avoiding sweets most of the time.  Beverages: Water, Tea, Coffee  Has patient met with a dietitian in the past?: Yes    Being Active:  Being Active Assessed Today: Yes " (household chores, bowling twice per week)  Exercise:: Yes (gardening, weeding, mowing the law, rudy, grocery shopping and cooking)  Days per week of moderate to strenuous exercise (like a brisk walk): 5 (walking and bowling 1-2 times per week)  On average, minutes per day of exercise at this level: 20 (15 minutes)  Exercise Minutes per Week: 100  Barrier to exercise: None    Monitoring:  Monitoring Assessed Today: Yes  Did patient bring glucose meter to appointment? : No  Blood Glucose Meter: ContourNext  Times checking blood sugar at home (number): 1  Times checking blood sugar at home (per): Day  Blood glucose trend: Decreasing    Taking Medications: n/a  Current Treatments: Diet    Problem Solving:  Problem Solving Assessed Today: Yes  Is the patient at risk for hypoglycemia?: No  Is the patient at risk for DKA?: No    Hypoglycemia Symptoms  Hypoglycemia: None    Hypoglycemia Complications  Hypoglycemia Complications: None    Reducing Risks:  Reducing Risks Assessed Today: Yes  Diabetes Risks: Age over 45 years, Hyperlipidemia, Sedentary Lifestyle, Family History  CAD Risks: Diabetes Mellitus, Dyslipidemia, Hypertension, Obesity, Post-menopausal, Sedentary lifestyle  Has dilated eye exam at least once a year?: Yes  Sees dentist every 6 months?: Yes  Feet checked by healthcare provider in the last year?: Yes    Healthy Coping:  Healthy Coping Assessed Today: Yes  Emotional response to diabetes: Ready to learn  Informal Support system:: Children, Family, Friends, Spouse  Stage of change: ACTION (Actively working towards change)  Support resources: None  Patient Activation Measure Survey Score:       No data to display                  Care Plan and Education Provided:  Care Plan: Diabetes   Updates made by Gisela Dash RD since 8/29/2024 12:00 AM        Problem: HbA1C Not In Goal         Goal: Get HbA1C Level in Goal         Task: Discuss diabetes treatment plan with patient Completed 8/29/2024    Responsible User: Gisela Dash RD        Problem: Diabetes Self-Management Education Needed to Optimize Self-Care Behaviors         Goal: Healthy Eating - follow a healthy eating pattern for diabetes    This Visit's Progress: 100%        Goal: Being Active - get regular physical activity, working up to at least 150 minutes per week    This Visit's Progress: 100%   Recent Progress: 100%        Goal: Monitoring - monitor glucose and ketones as directed    This Visit's Progress: 100%   Recent Progress: 100%            Gisela Dash RD, LD, [facility]   Certified Diabetes Care and   [facility] Tuesdays and Thursdays  [facility] Wednesdays-virtual visits only    Time Spent: 60 minutes  Encounter Type: Individual    Any diabetes medication dose changes were made via the CDE Protocol per the patient's primary care provider. A copy of this encounter was shared with the provider.

## 2024-08-29 NOTE — PATIENT INSTRUCTIONS
Continue to check blood sugar once per day at different times.  Continue to eat your current meal plan: Vegan, dairy free, and low-fat.  Continue to stay active every day.  Continue to drink a lot of water to stay hydrated and protect your kidneys.  Follow-up in 2 months.  Keep up the excellent work!    Blood sugar goals:  Before meals   2 hours after meals: less 180  Bedtime:     A1c: less than 7.0% (estimated average blood sugar of 154 mg/dl)

## 2024-09-04 ENCOUNTER — OFFICE VISIT (OUTPATIENT)
Dept: FAMILY MEDICINE | Facility: CLINIC | Age: 74
End: 2024-09-04
Payer: COMMERCIAL

## 2024-09-04 VITALS
HEART RATE: 82 BPM | RESPIRATION RATE: 16 BRPM | SYSTOLIC BLOOD PRESSURE: 136 MMHG | HEIGHT: 66 IN | TEMPERATURE: 98.7 F | OXYGEN SATURATION: 95 % | BODY MASS INDEX: 37.12 KG/M2 | WEIGHT: 231 LBS | DIASTOLIC BLOOD PRESSURE: 80 MMHG

## 2024-09-04 DIAGNOSIS — E11.9 TYPE 2 DIABETES MELLITUS WITHOUT COMPLICATION, WITHOUT LONG-TERM CURRENT USE OF INSULIN (H): Primary | ICD-10-CM

## 2024-09-04 DIAGNOSIS — I10 ESSENTIAL HYPERTENSION, BENIGN: ICD-10-CM

## 2024-09-04 DIAGNOSIS — E78.5 HYPERLIPIDEMIA, UNSPECIFIED HYPERLIPIDEMIA TYPE: ICD-10-CM

## 2024-09-04 DIAGNOSIS — L72.0 EPIDERMAL CYST: ICD-10-CM

## 2024-09-04 LAB — HBA1C MFR BLD: 6.8 % (ref 0–5.6)

## 2024-09-04 PROCEDURE — 36415 COLL VENOUS BLD VENIPUNCTURE: CPT | Performed by: FAMILY MEDICINE

## 2024-09-04 PROCEDURE — 99214 OFFICE O/P EST MOD 30 MIN: CPT | Performed by: FAMILY MEDICINE

## 2024-09-04 PROCEDURE — G2211 COMPLEX E/M VISIT ADD ON: HCPCS | Performed by: FAMILY MEDICINE

## 2024-09-04 PROCEDURE — 83036 HEMOGLOBIN GLYCOSYLATED A1C: CPT | Performed by: FAMILY MEDICINE

## 2024-09-04 NOTE — PROGRESS NOTES
"   Assessment & Plan     (E11.9) Type 2 diabetes mellitus without complication, without long-term current use of insulin (H)  (primary encounter diagnosis)  Comment: A1c 6.8 diabetes good controlled with diet and exercise. Home bs fasting around 100-130. She is not on medication.   Plan: HEMOGLOBIN A1C        We discussed diet control, regular exercise, eye care and foot care.     (I10) Benign Essential Hypertension  Comment: bp reasonable control. She takes medication as instructed and no side effect. Home bp is higher at August than the past. Sometimes > 140/80. Denies cp, sob, palpitation, headache and blurry vision. She noticed of her bp machine reading was higher than office machine.   Plan: continue current medication and monitor bp at home. She will get at new machine.     (E78.5) Hyperlipidemia, unspecified hyperlipidemia type  Comment: she is doing well with medication   Plan: continue current medication and low fat diet    (L72.0) Epidermal cyst  Comment: pt has a epidermal cyst on back with irritation for several days. Mild erythema.   Plan: advise to avoid squeeze and compress on it. Her bra is rub on it. Advise to avoid wear bra. If it gets worse she may need I&D.     The longitudinal plan of care for the diagnosis(es)/condition(s) as documented were addressed during this visit. Due to the added complexity in care, I will continue to support Imelda in the subsequent management and with ongoing continuity of care.        BMI  Estimated body mass index is 37.28 kg/m  as calculated from the following:    Height as of this encounter: 1.676 m (5' 6\").    Weight as of this encounter: 104.8 kg (231 lb).   Weight management plan: Discussed healthy diet and exercise guidelines      See Patient Instructions    Subjective   Imelda is a 73 year old, presenting for the following health issues:  Medication Update and Skin Check (Irritating red spot on mid center of back just above bra line- noticed it about a month " "ago. Pt thought it may be a pimple but wanted to double check)        9/4/2024     8:08 AM   Additional Questions   Roomed by Gen TOO CMA     History of Present Illness       Diabetes:   She presents for follow up of diabetes.  She is checking home blood glucose one time daily.   She checks blood glucose before meals.  Blood glucose is sometimes over 200 and never under 70.  When her blood glucose is low, the patient is asymptomatic for confusion, blurred vision, lethargy and reports not feeling dizzy, shaky, or weak.   She has no concerns regarding her diabetes at this time.  She is having weight loss.            She eats 2-3 servings of fruits and vegetables daily.She consumes 1 sweetened beverage(s) daily.She exercises with enough effort to increase her heart rate 9 or less minutes per day.  She exercises with enough effort to increase her heart rate 3 or less days per week.   She is taking medications regularly.         Hyperlipidemia Follow-Up    Are you regularly taking any medication or supplement to lower your cholesterol?   Yes- zocor  Are you having muscle aches or other side effects that you think could be caused by your cholesterol lowering medication?  No    Hypertension Follow-up    Do you check your blood pressure regularly outside of the clinic? Yes   Are you following a low salt diet? Yes  Are your blood pressures ever more than 140 on the top number (systolic) OR more   than 90 on the bottom number (diastolic), for example 140/90? Yes       Review of Systems  Constitutional, HEENT, cardiovascular, pulmonary, gi and gu systems are negative, except as otherwise noted.      Objective    /80 (BP Location: Left arm, Patient Position: Sitting, Cuff Size: Adult Regular)   Pulse 82   Temp 98.7  F (37.1  C) (Oral)   Resp 16   Ht 1.676 m (5' 6\")   Wt 104.8 kg (231 lb)   LMP  (LMP Unknown)   SpO2 95%   BMI 37.28 kg/m    Body mass index is 37.28 kg/m .  Physical Exam   GENERAL: alert and no " distress  NECK: no adenopathy, no asymmetry, masses, or scars  RESP: lungs clear to auscultation - no rales, rhonchi or wheezes  CV: regular rate and rhythm, normal S1 S2, no S3 or S4, no murmur, click or rub, no peripheral edema  ABDOMEN: soft, nontender, no hepatosplenomegaly, no masses and bowel sounds normal  MS: no gross musculoskeletal defects noted, no edema   Skin: a mild erythema epidermal cyst on middle of back. No tenderness.         Signed Electronically by: Johanny Tavares MD

## 2024-09-09 ENCOUNTER — HOSPITAL ENCOUNTER (OUTPATIENT)
Dept: MAMMOGRAPHY | Facility: CLINIC | Age: 74
Discharge: HOME | End: 2024-09-09
Attending: FAMILY MEDICINE | Admitting: FAMILY MEDICINE
Payer: COMMERCIAL

## 2024-09-09 DIAGNOSIS — Z12.31 VISIT FOR SCREENING MAMMOGRAM: ICD-10-CM

## 2024-09-09 PROCEDURE — 77063 BREAST TOMOSYNTHESIS BI: CPT

## 2024-10-24 ENCOUNTER — ALLIED HEALTH/NURSE VISIT (OUTPATIENT)
Dept: EDUCATION SERVICES | Facility: CLINIC | Age: 74
End: 2024-10-24
Payer: COMMERCIAL

## 2024-10-24 VITALS — BODY MASS INDEX: 36.64 KG/M2 | WEIGHT: 227 LBS

## 2024-10-24 DIAGNOSIS — E11.9 TYPE 2 DIABETES MELLITUS WITHOUT COMPLICATION, WITHOUT LONG-TERM CURRENT USE OF INSULIN (H): Primary | ICD-10-CM

## 2024-10-24 PROCEDURE — G0108 DIAB MANAGE TRN  PER INDIV: HCPCS | Performed by: REGISTERED NURSE

## 2024-10-24 NOTE — PATIENT INSTRUCTIONS
Continue to check blood sugar once per day at different times.  Continue to eat a vegan, low-fat, dairy-free diet.  Prioritize movement every day for at least 15 minutes.  Continue to drink a lot of water to stay hydrated and protect your kidneys.  Next visit in 2 months.    Blood sugar goals:  Before meals   2 hours after meals: less 180  Bedtime:     A1c: less than 7.0% (estimated average blood sugar of 154 mg/dl)

## 2024-10-24 NOTE — LETTER
10/24/2024         RE: Imelda El  [address]  [facility] [address]        Dear Colleague,    Thank you for referring your patient, Imelda El, to the [facility]. Please see a copy of my visit note below.    Diabetes Self-Management Education & Support  Type of Service: In Person Visit      ASSESSMENT:  2 month follow-up for diabetes education and counseling.  Imelda is checking her blood sugar once per day.  The majority of blood sugars are within goal.  She continues to eat a plant-based, dairy free, low-fat diet for 3 more weeks.  Then she may add back in 1 meal per week including meat. This eating plan does not limit carbohydrate.  The highest blood sugar was 239 after snacking on carissa strips.  Since entering the study Imelda has lost 16 pounds.  Encouraged her to increase movement to continued weight loss and keep it off.     BG LOG  Date Breakfast  Lunch  Dinner  Bedtime    Before After Before After Before After    10/14 137               10/15     91           10/16     120           10/17     95           10/18     98           10/19       134         10/20       143         10/21   99       10/22   125       10/23   93           Patient's most recent   Lab Results   Component Value Date    A1C 6.8 09/04/2024     is meeting goal of <7.0    Diabetes knowledge and skills assessment:   Patient is knowledgeable in diabetes management concepts related to: Healthy Eating, Being Active, Monitoring, Problem Solving, Reducing Risks, and Healthy Coping    Based on learning assessment above, most appropriate setting for further diabetes education would be: Individual setting.      PLAN  Continue to check blood sugar once per day at different times.  Continue to eat a vegan, low-fat, dairy-free diet.  Prioritize movement every day for at least 15 minutes.  Continue to drink a lot of water to stay hydrated and protect your kidneys.  Next visit in 2 months.    Topics to  "cover at upcoming visits: Assistance with long-term lifestyle changes.    See Care Plan for co-developed, patient-state behavior change goals.  AVS provided for patient today.    Education Materials Provided:  No new materials provided today    SUBJECTIVE/OBJECTIVE:  Presents for: Follow-up  Accompanied by: Self  Diabetes education in the past 24mo: Yes  Focus of Visit: Problem Solving  Diabetes type: Type 2  Date of diagnosis: 11/2/23  Disease course: Improving  How confident are you filling out medical forms by yourself:: Quite a bit  Diabetes management related comments/concerns: none  Transportation concerns: No  Difficulty affording diabetes testing supplies?: No  Other concerns:: Glasses  Cultural Influences/Ethnic Background:  Not  or     Diabetes Symptoms & Complications:  Diabetes Related Symptoms: None  Weight trend: Fluctuating  Symptom course: Stable  Disease course: Improving  Complications assessed today?: Yes  Autonomic neuropathy: No  CVA: No  Heart disease: No  Nephropathy: No  Peripheral neuropathy: No  Peripheral Vascular Disease: No  Retinopathy: No  Sexual dysfunction: No    Patient Problem List and Family Medical History reviewed for relevant medical history, current medical status, and diabetes risk factors.    Vitals:  Wt 103 kg (227 lb)   LMP  (LMP Unknown)   BMI 36.64 kg/m    Estimated body mass index is 36.64 kg/m  as calculated from the following:    Height as of 9/4/24: 1.676 m (5' 6\").    Weight as of this encounter: 103 kg (227 lb).     Wt Readings from Last 3 Encounters:   10/24/24 103 kg (227 lb)   09/04/24 104.8 kg (231 lb)   08/29/24 105.7 kg (233 lb)      Last 3 BP:   BP Readings from Last 3 Encounters:   09/04/24 136/80   05/23/24 130/70   03/01/24 130/64       History   Smoking Status     Never   Smokeless Tobacco     Never       Labs:  Lab Results   Component Value Date    A1C 6.8 09/04/2024     Lab Results   Component Value Date     11/02/2023     " "08/30/2021     Lab Results   Component Value Date     11/02/2023     Direct Measure HDL   Date Value Ref Range Status   11/02/2023 55 >=50 mg/dL Final   ]  GFR Estimate   Date Value Ref Range Status   11/02/2023 74 >60 mL/min/1.73m2 Final   09/28/2020 >60 >60 mL/min/1.73m2 Final     GFR Estimate If Black   Date Value Ref Range Status   09/28/2020 >60 >60 mL/min/1.73m2 Final     Lab Results   Component Value Date    CR 0.83 11/02/2023     No results found for: \"MICROALBUMIN\"    Healthy Eating:  Healthy Eating Assessed Today: Yes  Cultural/Hinduism diet restrictions?: No  Do you have any food allergies or intolerances?: No  Meal planning/habits: Avoiding sweets, Smaller portions, Heart healthy, Other  Please elaborate:: High fiber, vegetarian, dairy-free, low fat  Who cooks/prepares meals for you?: Self  Who purchases food in  your home?: Self  How many times a week on average do you eat food made away from home (restaurant/take-out)?: 1  Meals include: Breakfast, Lunch, Dinner, Afternoon Snack  Breakfast: 7:00 am cream of wheat or cream of rice or steel cut oats with berries or carissa and cinnamon or wheat chex or raisin bran or frosted shredded mini wheats with almond milk.  Lunch: 11:30 am salad with a lot of vegetables and dairy-free, low fat salad dressing. toasted bread with hummus, tomatoes or chick pea salad sandwich or leftovers  Dinner: 5:30-6:00 pm vegetable stir-mcmillan and white rice or tomato rice soup with brown rice or quinoa taco, vegan sour cream, beans, lettuce. Morning star hot dogs and dairy-free mashed potatoes with vegetarian gravy or mushroom stroganoff.  Snacks: Pretzel chips from the [employer], rosemary crackers, a lot of fruit (berries, nectarine, apple, melon, dried carissa)  Other: bought 5 vegetarian cookbooks. Using an instapot. Drinks a lot of water during the day. 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 Avoiding sweets most of the time.  Beverages: Water, Tea, Coffee  Has patient " met with a dietitian in the past?: Yes    Being Active:  Being Active Assessed Today: Yes (household chores, bowling twice per week)  Exercise:: Yes (cleaning up the yard for the winter, laundry, cooking, shopping.)  Days per week of moderate to strenuous exercise (like a brisk walk): 2 (walking and bowling 1-2 times per week)  On average, minutes per day of exercise at this level: 30 (15 minutes)  How intense was your typical exercise? : Light (like stretching or slow walking)  Exercise Minutes per Week: 60  Barrier to exercise: None    Monitoring:  Monitoring Assessed Today: Yes  Did patient bring glucose meter to appointment? : Yes  Blood Glucose Meter: ContourNext  Times checking blood sugar at home (number): 1  Times checking blood sugar at home (per): Day  Blood glucose trend: Decreasing    Taking Medications:  Current Treatments: Diet    Problem Solving:  Problem Solving Assessed Today: Yes  Is the patient at risk for hypoglycemia?: No  Is the patient at risk for DKA?: No    Hypoglycemia Symptoms  Hypoglycemia: None    Hypoglycemia Complications  Hypoglycemia Complications: None    Reducing Risks:  Reducing Risks Assessed Today: Yes  Diabetes Risks: Age over 45 years, Hyperlipidemia, Sedentary Lifestyle, Family History  CAD Risks: Diabetes Mellitus, Dyslipidemia, Hypertension, Obesity, Post-menopausal, Sedentary lifestyle  Has dilated eye exam at least once a year?: Yes  Sees dentist every 6 months?: Yes  Feet checked by healthcare provider in the last year?: Yes    Healthy Coping:  Healthy Coping Assessed Today: Yes  Emotional response to diabetes: Ready to learn  Informal Support system:: Children, Family, Friends, Spouse  Stage of change: ACTION (Actively working towards change)  Support resources: None  Patient Activation Measure Survey Score:       No data to display              Care Plan and Education Provided:  Healthy Eating: Weight Management and Being Active: Finding a physical activity routine  that works for you      Gisela Dash RD, LD, [facility]   Certified Diabetes Care and   [facility] Tuesdays and Thursdays  [facility] Wednesdays-virtual visits only    Time Spent: 60 minutes  Encounter Type: Individual    Any diabetes medication dose changes were made via the CDE Protocol per the patient's primary care provider. A copy of this encounter was shared with the provider.

## 2024-10-24 NOTE — PROGRESS NOTES
Diabetes Self-Management Education & Support  Type of Service: In Person Visit      ASSESSMENT:  2 month follow-up for diabetes education and counseling.  Imelda is checking her blood sugar once per day.  The majority of blood sugars are within goal.  She continues to eat a plant-based, dairy free, low-fat diet for 3 more weeks.  Then she may add back in 1 meal per week including meat. This eating plan does not limit carbohydrate.  The highest blood sugar was 239 after snacking on carissa strips.  Since entering the study Imelda has lost 16 pounds.  Encouraged her to increase movement to continued weight loss and keep it off.     BG LOG  Date Breakfast  Lunch  Dinner  Bedtime    Before After Before After Before After    10/14 137               10/15     91           10/16     120           10/17     95           10/18     98           10/19       134         10/20       143         10/21   99       10/22   125       10/23   93           Patient's most recent   Lab Results   Component Value Date    A1C 6.8 09/04/2024     is meeting goal of <7.0    Diabetes knowledge and skills assessment:   Patient is knowledgeable in diabetes management concepts related to: Healthy Eating, Being Active, Monitoring, Problem Solving, Reducing Risks, and Healthy Coping    Based on learning assessment above, most appropriate setting for further diabetes education would be: Individual setting.      PLAN  Continue to check blood sugar once per day at different times.  Continue to eat a vegan, low-fat, dairy-free diet.  Prioritize movement every day for at least 15 minutes.  Continue to drink a lot of water to stay hydrated and protect your kidneys.  Next visit in 2 months.    Topics to cover at upcoming visits: Assistance with long-term lifestyle changes.    See Care Plan for co-developed, patient-state behavior change goals.  AVS provided for patient today.    Education Materials Provided:  No new materials provided  "today    SUBJECTIVE/OBJECTIVE:  Presents for: Follow-up  Accompanied by: Self  Diabetes education in the past 24mo: Yes  Focus of Visit: Problem Solving  Diabetes type: Type 2  Date of diagnosis: 11/2/23  Disease course: Improving  How confident are you filling out medical forms by yourself:: Quite a bit  Diabetes management related comments/concerns: none  Transportation concerns: No  Difficulty affording diabetes testing supplies?: No  Other concerns:: Glasses  Cultural Influences/Ethnic Background:  Not  or     Diabetes Symptoms & Complications:  Diabetes Related Symptoms: None  Weight trend: Fluctuating  Symptom course: Stable  Disease course: Improving  Complications assessed today?: Yes  Autonomic neuropathy: No  CVA: No  Heart disease: No  Nephropathy: No  Peripheral neuropathy: No  Peripheral Vascular Disease: No  Retinopathy: No  Sexual dysfunction: No    Patient Problem List and Family Medical History reviewed for relevant medical history, current medical status, and diabetes risk factors.    Vitals:  Wt 103 kg (227 lb)   LMP  (LMP Unknown)   BMI 36.64 kg/m    Estimated body mass index is 36.64 kg/m  as calculated from the following:    Height as of 9/4/24: 1.676 m (5' 6\").    Weight as of this encounter: 103 kg (227 lb).     Wt Readings from Last 3 Encounters:   10/24/24 103 kg (227 lb)   09/04/24 104.8 kg (231 lb)   08/29/24 105.7 kg (233 lb)      Last 3 BP:   BP Readings from Last 3 Encounters:   09/04/24 136/80   05/23/24 130/70   03/01/24 130/64       History   Smoking Status    Never   Smokeless Tobacco    Never       Labs:  Lab Results   Component Value Date    A1C 6.8 09/04/2024     Lab Results   Component Value Date     11/02/2023     08/30/2021     Lab Results   Component Value Date     11/02/2023     Direct Measure HDL   Date Value Ref Range Status   11/02/2023 55 >=50 mg/dL Final   ]  GFR Estimate   Date Value Ref Range Status   11/02/2023 74 >60 " "mL/min/1.73m2 Final   09/28/2020 >60 >60 mL/min/1.73m2 Final     GFR Estimate If Black   Date Value Ref Range Status   09/28/2020 >60 >60 mL/min/1.73m2 Final     Lab Results   Component Value Date    CR 0.83 11/02/2023     No results found for: \"MICROALBUMIN\"    Healthy Eating:  Healthy Eating Assessed Today: Yes  Cultural/Buddhism diet restrictions?: No  Do you have any food allergies or intolerances?: No  Meal planning/habits: Avoiding sweets, Smaller portions, Heart healthy, Other  Please elaborate:: High fiber, vegetarian, dairy-free, low fat  Who cooks/prepares meals for you?: Self  Who purchases food in  your home?: Self  How many times a week on average do you eat food made away from home (restaurant/take-out)?: 1  Meals include: Breakfast, Lunch, Dinner, Afternoon Snack  Breakfast: 7:00 am cream of wheat or cream of rice or steel cut oats with berries or carissa and cinnamon or wheat chex or raisin bran or frosted shredded mini wheats with almond milk.  Lunch: 11:30 am salad with a lot of vegetables and dairy-free, low fat salad dressing. toasted bread with hummus, tomatoes or chick pea salad sandwich or leftovers  Dinner: 5:30-6:00 pm vegetable stir-mcmillan and white rice or tomato rice soup with brown rice or quinoa taco, vegan sour cream, beans, lettuce. Morning star hot dogs and dairy-free mashed potatoes with vegetarian gravy or mushroom stroganoff.  Snacks: Pretzel chips from the [employer], rosemary crackers, a lot of fruit (berries, nectarine, apple, melon, dried carissa)  Other: bought 5 vegetarian cookbooks. Using an instapot. Drinks a lot of water during the day. 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 Avoiding sweets most of the time.  Beverages: Water, Tea, Coffee  Has patient met with a dietitian in the past?: Yes    Being Active:  Being Active Assessed Today: Yes (household chores, bowling twice per week)  Exercise:: Yes (cleaning up the yard for the winter, laundry, cooking, shopping.)  Days per " week of moderate to strenuous exercise (like a brisk walk): 2 (walking and bowling 1-2 times per week)  On average, minutes per day of exercise at this level: 30 (15 minutes)  How intense was your typical exercise? : Light (like stretching or slow walking)  Exercise Minutes per Week: 60  Barrier to exercise: None    Monitoring:  Monitoring Assessed Today: Yes  Did patient bring glucose meter to appointment? : Yes  Blood Glucose Meter: ContourNext  Times checking blood sugar at home (number): 1  Times checking blood sugar at home (per): Day  Blood glucose trend: Decreasing    Taking Medications:  Current Treatments: Diet    Problem Solving:  Problem Solving Assessed Today: Yes  Is the patient at risk for hypoglycemia?: No  Is the patient at risk for DKA?: No    Hypoglycemia Symptoms  Hypoglycemia: None    Hypoglycemia Complications  Hypoglycemia Complications: None    Reducing Risks:  Reducing Risks Assessed Today: Yes  Diabetes Risks: Age over 45 years, Hyperlipidemia, Sedentary Lifestyle, Family History  CAD Risks: Diabetes Mellitus, Dyslipidemia, Hypertension, Obesity, Post-menopausal, Sedentary lifestyle  Has dilated eye exam at least once a year?: Yes  Sees dentist every 6 months?: Yes  Feet checked by healthcare provider in the last year?: Yes    Healthy Coping:  Healthy Coping Assessed Today: Yes  Emotional response to diabetes: Ready to learn  Informal Support system:: Children, Family, Friends, Spouse  Stage of change: ACTION (Actively working towards change)  Support resources: None  Patient Activation Measure Survey Score:       No data to display              Care Plan and Education Provided:  Healthy Eating: Weight Management and Being Active: Finding a physical activity routine that works for you      Gisela Dash RD, LD, CDCES   Certified Diabetes Care and   [facility]  [facility]  [facility]    Time Spent: 60 minutes  Encounter Type: Individual    Any diabetes medication dose changes were made via the CDE Protocol per the patient's primary care provider. A copy of this encounter was shared with the provider.

## 2024-10-25 ENCOUNTER — TRANSFERRED RECORDS (OUTPATIENT)
Dept: MULTI SPECIALTY CLINIC | Facility: CLINIC | Age: 74
End: 2024-10-25
Payer: COMMERCIAL

## 2024-10-25 LAB — RETINOPATHY: NORMAL

## 2024-11-03 SDOH — HEALTH STABILITY: PHYSICAL HEALTH: ON AVERAGE, HOW MANY DAYS PER WEEK DO YOU ENGAGE IN MODERATE TO STRENUOUS EXERCISE (LIKE A BRISK WALK)?: 4 DAYS

## 2024-11-03 SDOH — HEALTH STABILITY: PHYSICAL HEALTH: ON AVERAGE, HOW MANY MINUTES DO YOU ENGAGE IN EXERCISE AT THIS LEVEL?: 20 MIN

## 2024-11-03 ASSESSMENT — SOCIAL DETERMINANTS OF HEALTH (SDOH): HOW OFTEN DO YOU GET TOGETHER WITH FRIENDS OR RELATIVES?: THREE TIMES A WEEK

## 2024-11-08 ENCOUNTER — OFFICE VISIT (OUTPATIENT)
Dept: FAMILY MEDICINE | Facility: CLINIC | Age: 74
End: 2024-11-08
Attending: FAMILY MEDICINE
Payer: COMMERCIAL

## 2024-11-08 VITALS
RESPIRATION RATE: 14 BRPM | WEIGHT: 222 LBS | BODY MASS INDEX: 35.68 KG/M2 | SYSTOLIC BLOOD PRESSURE: 136 MMHG | HEIGHT: 66 IN | HEART RATE: 80 BPM | DIASTOLIC BLOOD PRESSURE: 82 MMHG | OXYGEN SATURATION: 97 % | TEMPERATURE: 98.4 F

## 2024-11-08 DIAGNOSIS — I10 ESSENTIAL HYPERTENSION, BENIGN: ICD-10-CM

## 2024-11-08 DIAGNOSIS — Z00.00 ENCOUNTER FOR MEDICARE ANNUAL WELLNESS EXAM: Primary | ICD-10-CM

## 2024-11-08 DIAGNOSIS — Z91.09 ENVIRONMENTAL ALLERGIES: ICD-10-CM

## 2024-11-08 DIAGNOSIS — E78.5 HYPERLIPIDEMIA, UNSPECIFIED HYPERLIPIDEMIA TYPE: ICD-10-CM

## 2024-11-08 DIAGNOSIS — J45.40 MODERATE PERSISTENT ASTHMA WITHOUT COMPLICATION: ICD-10-CM

## 2024-11-08 DIAGNOSIS — E11.9 TYPE 2 DIABETES MELLITUS WITHOUT COMPLICATION, WITHOUT LONG-TERM CURRENT USE OF INSULIN (H): ICD-10-CM

## 2024-11-08 PROBLEM — Z78.0 ASYMPTOMATIC POSTMENOPAUSAL STATUS: Status: ACTIVE | Noted: 2024-11-08

## 2024-11-08 PROBLEM — J30.9 ALLERGIC RHINITIS: Status: ACTIVE | Noted: 2024-11-08

## 2024-11-08 PROBLEM — Z86.0100 HISTORY OF COLONIC POLYPS: Status: ACTIVE | Noted: 2021-07-27

## 2024-11-08 PROBLEM — R15.9 FECAL INCONTINENCE: Status: ACTIVE | Noted: 2024-11-08

## 2024-11-08 LAB
ALBUMIN SERPL BCG-MCNC: 4.5 G/DL (ref 3.5–5.2)
ALP SERPL-CCNC: 77 U/L (ref 40–150)
ALT SERPL W P-5'-P-CCNC: 25 U/L (ref 0–50)
ANION GAP SERPL CALCULATED.3IONS-SCNC: 10 MMOL/L (ref 7–15)
AST SERPL W P-5'-P-CCNC: 25 U/L (ref 0–45)
BILIRUB SERPL-MCNC: 0.6 MG/DL
BUN SERPL-MCNC: 8 MG/DL (ref 8–23)
CALCIUM SERPL-MCNC: 9.8 MG/DL (ref 8.8–10.4)
CHLORIDE SERPL-SCNC: 101 MMOL/L (ref 98–107)
CHOLEST SERPL-MCNC: 189 MG/DL
CREAT SERPL-MCNC: 0.7 MG/DL (ref 0.51–0.95)
EGFRCR SERPLBLD CKD-EPI 2021: 90 ML/MIN/1.73M2
ERYTHROCYTE [DISTWIDTH] IN BLOOD BY AUTOMATED COUNT: 12 % (ref 10–15)
FASTING STATUS PATIENT QL REPORTED: YES
FASTING STATUS PATIENT QL REPORTED: YES
GLUCOSE SERPL-MCNC: 114 MG/DL (ref 70–99)
HCO3 SERPL-SCNC: 29 MMOL/L (ref 22–29)
HCT VFR BLD AUTO: 43.5 % (ref 35–47)
HDLC SERPL-MCNC: 47 MG/DL
HGB BLD-MCNC: 14.8 G/DL (ref 11.7–15.7)
LDLC SERPL CALC-MCNC: 98 MG/DL
MCH RBC QN AUTO: 31.2 PG (ref 26.5–33)
MCHC RBC AUTO-ENTMCNC: 34 G/DL (ref 31.5–36.5)
MCV RBC AUTO: 92 FL (ref 78–100)
NONHDLC SERPL-MCNC: 142 MG/DL
PLATELET # BLD AUTO: 269 10E3/UL (ref 150–450)
POTASSIUM SERPL-SCNC: 4 MMOL/L (ref 3.4–5.3)
PROT SERPL-MCNC: 6.9 G/DL (ref 6.4–8.3)
RBC # BLD AUTO: 4.75 10E6/UL (ref 3.8–5.2)
SODIUM SERPL-SCNC: 140 MMOL/L (ref 135–145)
TRIGL SERPL-MCNC: 218 MG/DL
TSH SERPL DL<=0.005 MIU/L-ACNC: 1.31 UIU/ML (ref 0.3–4.2)
WBC # BLD AUTO: 5.5 10E3/UL (ref 4–11)

## 2024-11-08 PROCEDURE — 99214 OFFICE O/P EST MOD 30 MIN: CPT | Mod: 25 | Performed by: FAMILY MEDICINE

## 2024-11-08 PROCEDURE — 85027 COMPLETE CBC AUTOMATED: CPT | Performed by: FAMILY MEDICINE

## 2024-11-08 PROCEDURE — G0439 PPPS, SUBSEQ VISIT: HCPCS | Performed by: FAMILY MEDICINE

## 2024-11-08 PROCEDURE — 36415 COLL VENOUS BLD VENIPUNCTURE: CPT | Performed by: FAMILY MEDICINE

## 2024-11-08 PROCEDURE — 84443 ASSAY THYROID STIM HORMONE: CPT | Performed by: FAMILY MEDICINE

## 2024-11-08 PROCEDURE — 80053 COMPREHEN METABOLIC PANEL: CPT | Performed by: FAMILY MEDICINE

## 2024-11-08 PROCEDURE — 80061 LIPID PANEL: CPT | Performed by: FAMILY MEDICINE

## 2024-11-08 RX ORDER — SIMVASTATIN 40 MG
40 TABLET ORAL AT BEDTIME
Qty: 90 TABLET | Refills: 3 | Status: SHIPPED | OUTPATIENT
Start: 2024-11-08

## 2024-11-08 RX ORDER — LISINOPRIL AND HYDROCHLOROTHIAZIDE 20; 25 MG/1; MG/1
1 TABLET ORAL DAILY
Qty: 90 TABLET | Refills: 3 | Status: SHIPPED | OUTPATIENT
Start: 2024-11-08

## 2024-11-08 RX ORDER — FLUTICASONE PROPIONATE 50 MCG
2 SPRAY, SUSPENSION (ML) NASAL DAILY
Qty: 15.8 ML | Refills: 11 | Status: SHIPPED | OUTPATIENT
Start: 2024-11-08

## 2024-11-08 ASSESSMENT — ASTHMA QUESTIONNAIRES
ACT_TOTALSCORE: 25
ACT_TOTALSCORE: 25
QUESTION_2 LAST FOUR WEEKS HOW OFTEN HAVE YOU HAD SHORTNESS OF BREATH: NOT AT ALL
QUESTION_3 LAST FOUR WEEKS HOW OFTEN DID YOUR ASTHMA SYMPTOMS (WHEEZING, COUGHING, SHORTNESS OF BREATH, CHEST TIGHTNESS OR PAIN) WAKE YOU UP AT NIGHT OR EARLIER THAN USUAL IN THE MORNING: NOT AT ALL
QUESTION_4 LAST FOUR WEEKS HOW OFTEN HAVE YOU USED YOUR RESCUE INHALER OR NEBULIZER MEDICATION (SUCH AS ALBUTEROL): NOT AT ALL
QUESTION_1 LAST FOUR WEEKS HOW MUCH OF THE TIME DID YOUR ASTHMA KEEP YOU FROM GETTING AS MUCH DONE AT WORK, SCHOOL OR AT HOME: NONE OF THE TIME
QUESTION_5 LAST FOUR WEEKS HOW WOULD YOU RATE YOUR ASTHMA CONTROL: COMPLETELY CONTROLLED

## 2024-11-08 NOTE — PROGRESS NOTES
Preventive Care Visit  [facility]  Johanny Tavares MD, Family Medicine  Nov 8, 2024      Assessment & Plan     (Z00.00) Encounter for Medicare annual wellness exam  (primary encounter diagnosis)  Comment: encourage annual wellness visit and vaccine up date  Plan:     (E11.9) Type 2 diabetes mellitus without complication, without long-term current use of insulin (H)  Comment: DM good controlled with diet and exercise.   Plan: continue diet control, regular exercises. Eye care and foot care addressed    (I10) Essential hypertension, benign  Comment: bp reasonable controlled. She takes medication as instructed and no side effect  Plan: lisinopril-hydrochlorothiazide (ZESTORETIC)         20-25 MG tablet, Comprehensive metabolic panel         (BMP + Alb, Alk Phos, ALT, AST, Total. Bili,         TP), TSH with free T4 reflex, CBC with         platelets        Continue current medication and home bp monitor. If bp is < 100.60 she will hold the medication.     (E78.5) Hyperlipidemia, unspecified hyperlipidemia type  Comment: she is doing well. She has high 10 years ascvd risk score.   Plan: simvastatin (ZOCOR) 40 MG tablet, Lipid panel         reflex to direct LDL Fasting        Continue current medication and low fat diet.     (Z91.09) Environmental allergies  Comment:   Plan: fluticasone (FLONASE) 50 MCG/ACT nasal spray            (J45.40) Moderate persistent asthma without complication  Comment: good controlled with controlled. She rarely uses albuterol inh. Denies cough, sob, wheezing.   Plan: mometasone (ASMANEX TWISTHALER) 220 MCG/ACT         inhaler            Patient has been advised of split billing requirements and indicates understanding: Yes        Counseling  Appropriate preventive services were addressed with this patient via screening, questionnaire, or discussion as appropriate for fall prevention, nutrition, physical activity, Tobacco-use cessation, social engagement, weight loss and  cognition.  Checklist reviewing preventive services available has been given to the patient.  Reviewed patient's diet, addressing concerns and/or questions.   She is at risk for psychosocial distress and has been provided with information to reduce risk.       See Patient Instructions    Subjective   Imelda is a 74 year old, presenting for the following:  Annual Visit (Non fasting Annual Medicare Wellness)        11/8/2024     1:04 PM   Additional Questions   Roomed by Kamilla Pereyra   Accompanied by none         Via the Health Maintenance questionnaire, the patient has reported the following services have been completed -Eye Exam: [facility] 2024-10-25, this information has been sent to the abstraction team.    HPI      Diabetes Follow-up    How often are you checking your blood sugar? One time daily  What time of day are you checking your blood sugars (select all that apply)?  Before meals  Have you had any blood sugars above 200?  No  Have you had any blood sugars below 70?  No  What symptoms do you notice when your blood sugar is low?  None  What concerns do you have today about your diabetes? None   Do you have any of these symptoms? (Select all that apply)  No numbness or tingling in feet.  No redness, sores or blisters on feet.  No complaints of excessive thirst.  No reports of blurry vision.  No significant changes to weight.          Hyperlipidemia Follow-Up    Are you regularly taking any medication or supplement to lower your cholesterol?   Yes- zocor  Are you having muscle aches or other side effects that you think could be caused by your cholesterol lowering medication?  No    Hypertension Follow-up    Do you check your blood pressure regularly outside of the clinic? Yes   Are you following a low salt diet? Yes  Are your blood pressures ever more than 140 on the top number (systolic) OR more   than 90 on the bottom number (diastolic), for example 140/90? No    BP Readings from Last 2  Encounters:   11/08/24 136/82   09/04/24 136/80     Hemoglobin A1C (%)   Date Value   09/04/2024 6.8 (H)   03/01/2024 6.9 (H)     LDL Cholesterol Calculated (mg/dL)   Date Value   11/02/2023 101 (H)   09/14/2022 82         Asthma Follow-Up    Was ACT completed today?  Yes        11/8/2024     1:16 PM   ACT Total Scores   ACT TOTAL SCORE (Goal Greater than or Equal to 20) 25   In the past 12 months, how many times did you visit the emergency room for your asthma without being admitted to the hospital? 0   In the past 12 months, how many times were you hospitalized overnight because of your asthma? 0        How many days per week do you miss taking your asthma controller medication?  0  Please describe any recent triggers for your asthma: upper respiratory infections  Have you had any Emergency Room Visits, Urgent Care Visits, or Hospital Admissions since your last office visit?  No    Health Care Directive  Patient does not have a Health Care Directive: Discussed advance care planning with patient; however, patient declined at this time.      11/3/2024   General Health   How would you rate your overall physical health? Good   Feel stress (tense, anxious, or unable to sleep) Only a little      (!) STRESS CONCERN      11/3/2024   Nutrition   Diet: Vegetarian/vegan            11/3/2024   Exercise   Days per week of moderate/strenous exercise 4 days   Average minutes spent exercising at this level 20 min            11/3/2024   Social Factors   Frequency of gathering with friends or relatives Three times a week   Worry food won't last until get money to buy more No   Food not last or not have enough money for food? No   Do you have housing? (Housing is defined as stable permanent housing and does not include staying ouside in a car, in a tent, in an abandoned building, in an overnight shelter, or couch-surfing.) Yes   Are you worried about losing your housing? No   Lack of transportation? No   Unable to get utilities  (heat,electricity)? No            11/3/2024   Fall Risk   Fallen 2 or more times in the past year? No     No    Trouble with walking or balance? No     No        Patient-reported    Multiple values from one day are sorted in reverse-chronological order          11/3/2024   Activities of Daily Living- Home Safety   Needs help with the following daily activites None of the above   Safety concerns in the home None of the above            11/3/2024   Dental   Dentist two times every year? Yes            11/3/2024   Hearing Screening   Hearing concerns? None of the above            11/3/2024   Driving Risk Screening   Patient/family members have concerns about driving No            11/3/2024   General Alertness/Fatigue Screening   Have you been more tired than usual lately? No            11/3/2024   Urinary Incontinence Screening   Bothered by leaking urine in past 6 months No            11/3/2024   TB Screening   Were you born outside of the [place]? No            Today's PHQ-2 Score:       11/8/2024     1:15 PM   PHQ-2 ( 1999 Pfizer)   Q1: Little interest or pleasure in doing things 0    Q2: Feeling down, depressed or hopeless 0    PHQ-2 Score 0    Q1: Little interest or pleasure in doing things Not at all   Q2: Feeling down, depressed or hopeless Not at all   PHQ-2 Score 0       Patient-reported           11/3/2024   Substance Use   Alcohol more than 3/day or more than 7/wk No   Do you have a current opioid prescription? No   How severe/bad is pain from 1 to 10? 0/10 (No Pain)   Do you use any other substances recreationally? No        Social History     Tobacco Use    Smoking status: Never     Passive exposure: Never    Smokeless tobacco: Never   Vaping Use    Vaping status: Never Used   Substance Use Topics    Alcohol use: Yes     Comment: seldom, maybe 1 or 2 times per month    Drug use: No           9/9/2024   LAST FHS-7 RESULTS   1st degree relative breast or ovarian cancer Yes   Any relative bilateral breast cancer  No   Any male have breast cancer No   Any ONE woman have BOTH breast AND ovarian cancer No   Any woman with breast cancer before 50yrs No   2 or more relatives with breast AND/OR ovarian cancer No   2 or more relatives with breast AND/OR bowel cancer No           Mammogram Screening - Mammogram every 1-2 years updated in Health Maintenance based on mutual decision making    ASCVD Risk   The 10-year ASCVD risk score (Ara MARVIN, et al., 2019) is: 36.7%    Values used to calculate the score:      Age: 74 years      Sex: Female      Is Non- : No      Diabetic: Yes      Tobacco smoker: No      Systolic Blood Pressure: 136 mmHg      Is BP treated: Yes      HDL Cholesterol: 55 mg/dL      Total Cholesterol: 186 mg/dL            Reviewed and updated as needed this visit by Provider   Tobacco  Allergies  Meds  Problems  Med Hx  Surg Hx  Fam Hx            Lab work is in process  Labs reviewed in EPIC  Current providers sharing in care for this patient include:  Patient Care Team:  Johanny Tavares MD as PCP - General  Johanny Tavares MD as Assigned PCP    The following health maintenance items are reviewed in Epic and correct as of today:  Health Maintenance   Topic Date Due    ASTHMA ACTION PLAN  09/14/2023    EYE EXAM  10/16/2024    BMP  11/02/2024    LIPID  11/02/2024    ANNUAL REVIEW OF HM ORDERS  11/02/2024    MEDICARE ANNUAL WELLNESS VISIT  11/02/2024    A1C  12/04/2024    MICROALBUMIN  03/01/2025    DIABETIC FOOT EXAM  03/01/2025    ASTHMA CONTROL TEST  05/08/2025    DEXA  10/14/2025    FALL RISK ASSESSMENT  11/08/2025    COLORECTAL CANCER SCREENING  07/23/2026    MAMMO SCREENING  09/09/2026    DTAP/TDAP/TD IMMUNIZATION (4 - Td or Tdap) 08/24/2028    ADVANCE CARE PLANNING  11/02/2028    HEPATITIS C SCREENING  Completed    PHQ-2 (once per calendar year)  Completed    INFLUENZA VACCINE  Completed    ZOSTER IMMUNIZATION  Completed    RSV VACCINE  Completed    COVID-19 Vaccine  Completed     "HPV IMMUNIZATION  Aged Out    MENINGITIS IMMUNIZATION  Aged Out    RSV MONOCLONAL ANTIBODY  Aged Out    Pneumococcal Vaccine: 65+ Years  Discontinued         Review of Systems  Constitutional, HEENT, cardiovascular, pulmonary, GI, , musculoskeletal, neuro, skin, endocrine and psych systems are negative, except as otherwise noted.     Objective    Exam  /82 (BP Location: Left arm, Patient Position: Sitting, Cuff Size: Adult Regular)   Pulse 80   Temp 98.4  F (36.9  C) (Oral)   Resp 14   Ht 1.676 m (5' 6\")   Wt 100.7 kg (222 lb)   LMP  (LMP Unknown)   SpO2 97%   Breastfeeding No   BMI 35.83 kg/m     Estimated body mass index is 35.83 kg/m  as calculated from the following:    Height as of this encounter: 1.676 m (5' 6\").    Weight as of this encounter: 100.7 kg (222 lb).    Physical Exam  GENERAL: alert and no distress  EYES: Eyes grossly normal to inspection, PERRL and conjunctivae and sclerae normal  HENT: ear canals and TM's normal, nose and mouth without ulcers or lesions  NECK: no adenopathy, no asymmetry, masses, or scars  RESP: lungs clear to auscultation - no rales, rhonchi or wheezes  BREAST: normal without masses, tenderness or nipple discharge and no palpable axillary masses or adenopathy  CV: regular rate and rhythm, normal S1 S2, no S3 or S4, no murmur, click or rub, no peripheral edema  ABDOMEN: soft, nontender, no hepatosplenomegaly, no masses and bowel sounds normal  MS: no gross musculoskeletal defects noted, no edema  SKIN: no suspicious lesions or rashes  NEURO: Normal strength and tone, mentation intact and speech normal  PSYCH: mentation appears normal, affect normal/bright        11/8/2024   Mini Cog   Clock Draw Score 2 Normal   3 Item Recall 3 objects recalled   Mini Cog Total Score 5                 Signed Electronically by: Johanny Tavares MD    "

## 2024-11-08 NOTE — PATIENT INSTRUCTIONS
Patient Education   Preventive Care Advice   This is general advice given by our system to help you stay healthy. However, your care team may have specific advice just for you. Please talk to your care team about your preventive care needs.  Nutrition  Eat 5 or more servings of fruits and vegetables each day.  Try wheat bread, brown rice and whole grain pasta (instead of white bread, rice, and pasta).  Get enough calcium and vitamin D. Check the label on foods and aim for 100% of the RDA (recommended daily allowance).  Lifestyle  Exercise at least 150 minutes each week  (30 minutes a day, 5 days a week).  Do muscle strengthening activities 2 days a week. These help control your weight and prevent disease.  No smoking.  Wear sunscreen to prevent skin cancer.  Have a dental exam and cleaning every 6 months.  Yearly exams  See your health care team every year to talk about:  Any changes in your health.  Any medicines your care team has prescribed.  Preventive care, family planning, and ways to prevent chronic diseases.  Shots (vaccines)   HPV shots (up to age 26), if you've never had them before.  Hepatitis B shots (up to age 59), if you've never had them before.  COVID-19 shot: Get this shot when it's due.  Flu shot: Get a flu shot every year.  Tetanus shot: Get a tetanus shot every 10 years.  Pneumococcal, hepatitis A, and RSV shots: Ask your care team if you need these based on your risk.  Shingles shot (for age 50 and up)  General health tests  Diabetes screening:  Starting at age 35, Get screened for diabetes at least every 3 years.  If you are younger than age 35, ask your care team if you should be screened for diabetes.  Cholesterol test: At age 39, start having a cholesterol test every 5 years, or more often if advised.  Bone density scan (DEXA): At age 50, ask your care team if you should have this scan for osteoporosis (brittle bones).  Hepatitis C: Get tested at least once in your life.  STIs (sexually  transmitted infections)  Before age 24: Ask your care team if you should be screened for STIs.  After age 24: Get screened for STIs if you're at risk. You are at risk for STIs (including HIV) if:  You are sexually active with more than one person.  You don't use condoms every time.  You or a partner was diagnosed with a sexually transmitted infection.  If you are at risk for HIV, ask about PrEP medicine to prevent HIV.  Get tested for HIV at least once in your life, whether you are at risk for HIV or not.  Cancer screening tests  Cervical cancer screening: If you have a cervix, begin getting regular cervical cancer screening tests starting at age 21.  Breast cancer scan (mammogram): If you've ever had breasts, begin having regular mammograms starting at age 40. This is a scan to check for breast cancer.  Colon cancer screening: It is important to start screening for colon cancer at age 45.  Have a colonoscopy test every 10 years (or more often if you're at risk) Or, ask your provider about stool tests like a FIT test every year or Cologuard test every 3 years.  To learn more about your testing options, visit:   .  For help making a decision, visit:   https://bit.ly/gv10120.  Prostate cancer screening test: If you have a prostate, ask your care team if a prostate cancer screening test (PSA) at age 55 is right for you.  Lung cancer screening: If you are a current or former smoker ages 50 to 80, ask your care team if ongoing lung cancer screenings are right for you.  For informational purposes only. Not to replace the advice of your health care provider. Copyright   2023 [employer]). All rights reserved. Clinically reviewed by the [facility] Transitions Program. My Top 10 [id number] - REV 01/24.

## 2024-12-08 ENCOUNTER — HEALTH MAINTENANCE LETTER (OUTPATIENT)
Age: 74
End: 2024-12-08

## 2025-01-21 ENCOUNTER — ALLIED HEALTH/NURSE VISIT (OUTPATIENT)
Dept: EDUCATION SERVICES | Facility: CLINIC | Age: 75
End: 2025-01-21
Payer: COMMERCIAL

## 2025-01-21 VITALS — BODY MASS INDEX: 36.8 KG/M2 | WEIGHT: 228 LBS

## 2025-01-21 DIAGNOSIS — E11.9 TYPE 2 DIABETES MELLITUS WITHOUT COMPLICATION, WITHOUT LONG-TERM CURRENT USE OF INSULIN (H): Primary | ICD-10-CM

## 2025-01-21 PROCEDURE — 97802 MEDICAL NUTRITION INDIV IN: CPT | Performed by: REGISTERED NURSE

## 2025-01-21 PROCEDURE — 99207 PR DROP WITH A PROCEDURE: CPT | Performed by: REGISTERED NURSE

## 2025-01-21 NOTE — LETTER
1/21/2025         RE: Imelda El  [address] [place] [facility] [address]        Dear Colleague,    Thank you for referring your patient, Imelda El, to the [facility]. Please see a copy of my visit note below.    Diabetes Self-Management Education & Support  Type of Service: In Person Visit    Assessment  3-month follow-up visit for diabetes education and counseling.   Imelda completed plant-based, low-fat, dairy free study 11/15/24.  She continues to make healthy food choices and added back in lean protein.  Noted she lost 5 more pounds since her last visit.  Patient weighs herself daily.  Weight has been fluctuating.  Imelda has increased her activity.  She joined a senior fitness class at her [employer] and is bowling twice per week.  Imelda is checking her blood sugar regularly with all blood sugars within target.  Patient would like a quarterly diabetes education follow-up appointment to stay accountable.    Patient's most recent   Lab Results   Component Value Date    A1C 6.8 09/04/2024     is meeting goal of <7.0    Diabetes knowledge and skills assessment:   Patient is knowledgeable in diabetes management concepts related to: Healthy Eating, Being Active, Monitoring, Problem Solving, Reducing Risks, and Healthy Coping    Based on learning assessment above, most appropriate setting for further diabetes education would be: Individual setting.    Care Plan and Education Provided:  Assistance making lifestyle changes    Patient verbalized understanding of diabetes self-management education concepts discussed, opportunities for ongoing education and support, and recommendations provided today.    Plan  Continue to check blood sugar once per day.  Continue to eat a reduced carbohydrate, low fat, low salt diet.  Continue current exercise routine.  Continue to drink a lot of water to stay hydrated and protect your kidneys.  Follow-up in 3 months.    Topics to cover at upcoming  "visits: Being Active, Monitoring, and assessment of long term lifestyle changes.    Follow-up:  Upcoming Diabetes Ed Appointments     Visit Type Date Time Department            See Care Plan for co-developed, patient-state behavior change goals.    Education Materials Provided:  No new materials provided today    Subjective/Objective  Imelda is an 74 year old year old, presenting for the following diabetes education related to: Presents for: Follow-up  Accompanied by: Self  Diabetes education in the past 24mo: Yes  Focus of Visit: Problem Solving, Assistance w/ making life changes  Diabetes type: Type 2  Date of diagnosis: 11/2/23  Disease course: Improving  How confident are you filling out medical forms by yourself:: Quite a bit  Diabetes management related comments/concerns: none  Transportation concerns: No  Difficulty affording diabetes testing supplies?: No  Other concerns:: Glasses  Cultural Influences/Ethnic Background:  Not  or     Diabetes Symptoms & Complications:  Diabetes Related Symptoms: None  Weight trend: Fluctuating  Symptom course: Stable  Disease course: Improving  Complications assessed today?: Yes  Autonomic neuropathy: No  CVA: No  Heart disease: No  Nephropathy: No  Peripheral neuropathy: No  Peripheral Vascular Disease: No  Retinopathy: No  Sexual dysfunction: No    Patient Problem List and Family Medical History reviewed for relevant medical history, current medical status, and diabetes risk factors.    Vitals:  Wt 103.4 kg (228 lb)   LMP  (LMP Unknown)   BMI 36.80 kg/m    Estimated body mass index is 36.8 kg/m  as calculated from the following:    Height as of 11/8/24: 1.676 m (5' 6\").    Weight as of this encounter: 103.4 kg (228 lb). 222.6 lb at home    Wt Readings from Last 3 Encounters:   01/21/25 103.4 kg (228 lb)   11/08/24 100.7 kg (222 lb)   10/24/24 103 kg (227 lb)      Last 3 BP:   BP Readings from Last 3 Encounters:   11/08/24 136/82   09/04/24 136/80   05/23/24 " "130/70       History   Smoking Status     Never   Smokeless Tobacco     Never       Labs:  Lab Results   Component Value Date    A1C 6.8 09/04/2024     Lab Results   Component Value Date     11/08/2024     08/30/2021     Lab Results   Component Value Date    LDL 98 11/08/2024     Direct Measure HDL   Date Value Ref Range Status   11/08/2024 47 (L) >=50 mg/dL Final   ]  GFR Estimate   Date Value Ref Range Status   11/08/2024 90 >60 mL/min/1.73m2 Final     Comment:     eGFR calculated using 2021 CKD-EPI equation.   09/28/2020 >60 >60 mL/min/1.73m2 Final     GFR Estimate If Black   Date Value Ref Range Status   09/28/2020 >60 >60 mL/min/1.73m2 Final     Lab Results   Component Value Date    CR 0.70 11/08/2024     No results found for: \"MICROALBUMIN\"    Healthy Eating:  Healthy Eating Assessed Today: Yes  Cultural/Voodoo diet restrictions?: No  Do you have any food allergies or intolerances?: No  Meal planning/habits: Avoiding sweets, Smaller portions, Heart healthy, Other  Please elaborate:: High fiber, low fat, low sodium  Who cooks/prepares meals for you?: Self  Who purchases food in  your home?: Self  How many times a week on average do you eat food made away from home (restaurant/take-out)?: 1  Meals include: Breakfast, Lunch, Dinner, Afternoon Snack  Breakfast: 7:00 am cream of wheat or cream of rice or steel cut oats with berries or carissa and cinnamon or wheat chex or raisin bran or frosted shredded mini wheats with almond milk.  Lunch: 11:30 am sandwich with ham and cheese, hot tea or coffee  Dinner: 5:30-6:00 PM a lot of soup or 2 slices of toast with butter and jelly. coffee or tea or water.  Snacks: Triscuits or sathya doones (occasionally)  Other: Drinks a lot of water. Using an instapot. Drinks a lot of water during the day. 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 Avoiding sweets most of the time.  Beverages: Water, Tea, Coffee  Has patient met with a dietitian in the past?: Yes    Being " Active:  Being Active Assessed Today: Yes (household chores, bowling twice per week)  Exercise:: Yes (cleaning up the yard for the winter, laundry, cooking, shopping.)  Days per week of moderate to strenuous exercise (like a brisk walk): 4 (walking and bowling 1-2 times per week)  On average, minutes per day of exercise at this level: 60 (15 minutes)  How intense was your typical exercise? : Moderate (like brisk walking)  Exercise Minutes per Week: 240  Barrier to exercise: None    Monitoring:  Monitoring Assessed Today: Yes  Did patient bring glucose meter to appointment? : Yes  Blood Glucose Meter: ContourNext  Times checking blood sugar at home (number): 1  Times checking blood sugar at home (per): Day  Blood glucose trend: Decreasing    Date Breakfast  Lunch  Dinner  Bedtime    Before After Before After Before After    1/20     97           1/18         167       1/17     119           1/16         104       1/15     93           1/14     121           1/13     84               Healthy Coping:  Healthy Coping Assessed Today: Yes  Emotional response to diabetes: Ready to learn  Informal Support system:: Children, Family, Friends, Spouse  Stage of change: ACTION (Actively working towards change)  Support resources: None    Gisela Dash RD  Time Spent: 45 minutes  Encounter Type: Individual    Any diabetes medication dose changes were made via the CDCES Standing Orders under the patient's referring provider.

## 2025-01-21 NOTE — PATIENT INSTRUCTIONS
Continue to check blood sugar once per day.  Continue to eat a reduced carbohydrate, low fat, low salt diet.  Continue current exercise routine.  Continue to drink a lot of water to stay hydrated and protect your kidneys.  Follow-up in 3 months.    Blood sugar goals:  Before meals   2 hours after meals: less 180  Bedtime:     A1c: less than 7.0% (estimated average blood sugar of 154 mg/dl)

## 2025-01-21 NOTE — PROGRESS NOTES
Diabetes Self-Management Education & Support  Type of Service: In Person Visit    Assessment  3-month follow-up visit for diabetes education and counseling.   Imelda completed plant-based, low-fat, dairy free study 11/15/24.  She continues to make healthy food choices and added back in lean protein.  Noted she lost 5 more pounds since her last visit.  Patient weighs herself daily.  Weight has been fluctuating.  Imelda has increased her activity.  She joined a senior fitness class at her [employer] and is bowling twice per week.  Imelda is checking her blood sugar regularly with all blood sugars within target.  Patient would like a quarterly diabetes education follow-up appointment to stay accountable.    Patient's most recent   Lab Results   Component Value Date    A1C 6.8 09/04/2024     is meeting goal of <7.0    Diabetes knowledge and skills assessment:   Patient is knowledgeable in diabetes management concepts related to: Healthy Eating, Being Active, Monitoring, Problem Solving, Reducing Risks, and Healthy Coping    Based on learning assessment above, most appropriate setting for further diabetes education would be: Individual setting.    Care Plan and Education Provided:  Assistance making lifestyle changes    Patient verbalized understanding of diabetes self-management education concepts discussed, opportunities for ongoing education and support, and recommendations provided today.    Plan  Continue to check blood sugar once per day.  Continue to eat a reduced carbohydrate, low fat, low salt diet.  Continue current exercise routine.  Continue to drink a lot of water to stay hydrated and protect your kidneys.  Follow-up in 3 months.    Topics to cover at upcoming visits: Being Active, Monitoring, and assessment of long term lifestyle changes.    Follow-up:  Upcoming Diabetes Ed Appointments     Visit Type Date Time Department            See Care Plan for co-developed, patient-state behavior change goals.    Education  "Materials Provided:  No new materials provided today    Subjective/Objective  Imelda is an 74 year old year old, presenting for the following diabetes education related to: Presents for: Follow-up  Accompanied by: Self  Diabetes education in the past 24mo: Yes  Focus of Visit: Problem Solving, Assistance w/ making life changes  Diabetes type: Type 2  Date of diagnosis: 11/2/23  Disease course: Improving  How confident are you filling out medical forms by yourself:: Quite a bit  Diabetes management related comments/concerns: none  Transportation concerns: No  Difficulty affording diabetes testing supplies?: No  Other concerns:: Glasses  Cultural Influences/Ethnic Background:  Not  or     Diabetes Symptoms & Complications:  Diabetes Related Symptoms: None  Weight trend: Fluctuating  Symptom course: Stable  Disease course: Improving  Complications assessed today?: Yes  Autonomic neuropathy: No  CVA: No  Heart disease: No  Nephropathy: No  Peripheral neuropathy: No  Peripheral Vascular Disease: No  Retinopathy: No  Sexual dysfunction: No    Patient Problem List and Family Medical History reviewed for relevant medical history, current medical status, and diabetes risk factors.    Vitals:  Wt 103.4 kg (228 lb)   LMP  (LMP Unknown)   BMI 36.80 kg/m    Estimated body mass index is 36.8 kg/m  as calculated from the following:    Height as of 11/8/24: 1.676 m (5' 6\").    Weight as of this encounter: 103.4 kg (228 lb). 222.6 lb at home    Wt Readings from Last 3 Encounters:   01/21/25 103.4 kg (228 lb)   11/08/24 100.7 kg (222 lb)   10/24/24 103 kg (227 lb)      Last 3 BP:   BP Readings from Last 3 Encounters:   11/08/24 136/82   09/04/24 136/80   05/23/24 130/70       History   Smoking Status    Never   Smokeless Tobacco    Never       Labs:  Lab Results   Component Value Date    A1C 6.8 09/04/2024     Lab Results   Component Value Date     11/08/2024     08/30/2021     Lab Results   Component " "Value Date    LDL 98 11/08/2024     Direct Measure HDL   Date Value Ref Range Status   11/08/2024 47 (L) >=50 mg/dL Final   ]  GFR Estimate   Date Value Ref Range Status   11/08/2024 90 >60 mL/min/1.73m2 Final     Comment:     eGFR calculated using 2021 CKD-EPI equation.   09/28/2020 >60 >60 mL/min/1.73m2 Final     GFR Estimate If Black   Date Value Ref Range Status   09/28/2020 >60 >60 mL/min/1.73m2 Final     Lab Results   Component Value Date    CR 0.70 11/08/2024     No results found for: \"MICROALBUMIN\"    Healthy Eating:  Healthy Eating Assessed Today: Yes  Cultural/Samaritan diet restrictions?: No  Do you have any food allergies or intolerances?: No  Meal planning/habits: Avoiding sweets, Smaller portions, Heart healthy, Other  Please elaborate:: High fiber, low fat, low sodium  Who cooks/prepares meals for you?: Self  Who purchases food in  your home?: Self  How many times a week on average do you eat food made away from home (restaurant/take-out)?: 1  Meals include: Breakfast, Lunch, Dinner, Afternoon Snack  Breakfast: 7:00 am cream of wheat or cream of rice or steel cut oats with berries or carissa and cinnamon or wheat chex or raisin bran or frosted shredded mini wheats with almond milk.  Lunch: 11:30 am sandwich with ham and cheese, hot tea or coffee  Dinner: 5:30-6:00 PM a lot of soup or 2 slices of toast with butter and jelly. coffee or tea or water.  Snacks: Triscuits or sathya doones (occasionally)  Other: Drinks a lot of water. Using an instapot. Drinks a lot of water during the day. 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 Avoiding sweets most of the time.  Beverages: Water, Tea, Coffee  Has patient met with a dietitian in the past?: Yes    Being Active:  Being Active Assessed Today: Yes (household chores, bowling twice per week)  Exercise:: Yes (cleaning up the yard for the winter, laundry, cooking, shopping.)  Days per week of moderate to strenuous exercise (like a brisk walk): 4 (walking and bowling 1-2 " times per week)  On average, minutes per day of exercise at this level: 60 (15 minutes)  How intense was your typical exercise? : Moderate (like brisk walking)  Exercise Minutes per Week: 240  Barrier to exercise: None    Monitoring:  Monitoring Assessed Today: Yes  Did patient bring glucose meter to appointment? : Yes  Blood Glucose Meter: ContourNext  Times checking blood sugar at home (number): 1  Times checking blood sugar at home (per): Day  Blood glucose trend: Decreasing    Date Breakfast  Lunch  Dinner  Bedtime    Before After Before After Before After    1/20     97           1/18         167       1/17     119           1/16         104       1/15     93           1/14     121           1/13     84               Healthy Coping:  Healthy Coping Assessed Today: Yes  Emotional response to diabetes: Ready to learn  Informal Support system:: Children, Family, Friends, Spouse  Stage of change: ACTION (Actively working towards change)  Support resources: None    Gisela Dash RD  Time Spent: 45 minutes  Encounter Type: Individual    Any diabetes medication dose changes were made via the [facility] Standing Orders under the patient's referring provider.

## 2025-03-16 ENCOUNTER — HEALTH MAINTENANCE LETTER (OUTPATIENT)
Age: 75
End: 2025-03-16

## 2025-05-05 ENCOUNTER — OFFICE VISIT (OUTPATIENT)
Dept: FAMILY MEDICINE | Facility: CLINIC | Age: 75
End: 2025-05-05
Payer: COMMERCIAL

## 2025-05-05 VITALS
OXYGEN SATURATION: 98 % | SYSTOLIC BLOOD PRESSURE: 150 MMHG | BODY MASS INDEX: 38.09 KG/M2 | WEIGHT: 237 LBS | RESPIRATION RATE: 16 BRPM | TEMPERATURE: 98.4 F | HEART RATE: 76 BPM | DIASTOLIC BLOOD PRESSURE: 70 MMHG | HEIGHT: 66 IN

## 2025-05-05 DIAGNOSIS — J45.40 MODERATE PERSISTENT ASTHMA WITHOUT COMPLICATION: ICD-10-CM

## 2025-05-05 DIAGNOSIS — E66.01 MORBID OBESITY (H): ICD-10-CM

## 2025-05-05 DIAGNOSIS — I10 ESSENTIAL HYPERTENSION, BENIGN: ICD-10-CM

## 2025-05-05 DIAGNOSIS — E11.9 TYPE 2 DIABETES MELLITUS WITHOUT COMPLICATION, WITHOUT LONG-TERM CURRENT USE OF INSULIN (H): Primary | ICD-10-CM

## 2025-05-05 DIAGNOSIS — E11.9 TYPE 2 DIABETES MELLITUS WITHOUT COMPLICATION, WITHOUT LONG-TERM CURRENT USE OF INSULIN (H): ICD-10-CM

## 2025-05-05 LAB
CREAT UR-MCNC: 90.2 MG/DL
EST. AVERAGE GLUCOSE BLD GHB EST-MCNC: 151 MG/DL
HBA1C MFR BLD: 6.9 % (ref 0–5.6)
MICROALBUMIN UR-MCNC: 43.6 MG/L
MICROALBUMIN/CREAT UR: 48.34 MG/G CR (ref 0–25)

## 2025-05-05 PROCEDURE — 82043 UR ALBUMIN QUANTITATIVE: CPT | Performed by: FAMILY MEDICINE

## 2025-05-05 PROCEDURE — G2211 COMPLEX E/M VISIT ADD ON: HCPCS | Performed by: FAMILY MEDICINE

## 2025-05-05 PROCEDURE — 99214 OFFICE O/P EST MOD 30 MIN: CPT | Performed by: FAMILY MEDICINE

## 2025-05-05 PROCEDURE — 36415 COLL VENOUS BLD VENIPUNCTURE: CPT | Performed by: FAMILY MEDICINE

## 2025-05-05 PROCEDURE — 3078F DIAST BP <80 MM HG: CPT | Performed by: FAMILY MEDICINE

## 2025-05-05 PROCEDURE — 83036 HEMOGLOBIN GLYCOSYLATED A1C: CPT | Performed by: FAMILY MEDICINE

## 2025-05-05 PROCEDURE — 82570 ASSAY OF URINE CREATININE: CPT | Performed by: FAMILY MEDICINE

## 2025-05-05 PROCEDURE — 3077F SYST BP >= 140 MM HG: CPT | Performed by: FAMILY MEDICINE

## 2025-05-05 RX ORDER — ALBUTEROL SULFATE 90 UG/1
2 INHALANT RESPIRATORY (INHALATION) EVERY 6 HOURS PRN
Qty: 18 G | Refills: 3 | Status: SHIPPED | OUTPATIENT
Start: 2025-05-05

## 2025-05-05 RX ORDER — LANCETS
EACH MISCELLANEOUS
Qty: 100 EACH | Refills: 4 | Status: SHIPPED | OUTPATIENT
Start: 2025-05-05

## 2025-05-05 ASSESSMENT — ASTHMA QUESTIONNAIRES
QUESTION_1 LAST FOUR WEEKS HOW MUCH OF THE TIME DID YOUR ASTHMA KEEP YOU FROM GETTING AS MUCH DONE AT WORK, SCHOOL OR AT HOME: NONE OF THE TIME
QUESTION_5 LAST FOUR WEEKS HOW WOULD YOU RATE YOUR ASTHMA CONTROL: COMPLETELY CONTROLLED
QUESTION_2 LAST FOUR WEEKS HOW OFTEN HAVE YOU HAD SHORTNESS OF BREATH: NOT AT ALL
ACT_TOTALSCORE: 24
QUESTION_4 LAST FOUR WEEKS HOW OFTEN HAVE YOU USED YOUR RESCUE INHALER OR NEBULIZER MEDICATION (SUCH AS ALBUTEROL): NOT AT ALL
QUESTION_3 LAST FOUR WEEKS HOW OFTEN DID YOUR ASTHMA SYMPTOMS (WHEEZING, COUGHING, SHORTNESS OF BREATH, CHEST TIGHTNESS OR PAIN) WAKE YOU UP AT NIGHT OR EARLIER THAN USUAL IN THE MORNING: ONCE OR TWICE

## 2025-05-05 NOTE — PROGRESS NOTES
"  Assessment & Plan     (E11.9) Type 2 diabetes mellitus without complication, without long-term current use of insulin (H)  (primary encounter diagnosis)  Comment: A1c 6.9 diabetes good controlled with diet and exercise.   Plan: HEMOGLOBIN A1C, Albumin Random Urine         Quantitative with Creat Ratio        Continue diet control and regular exercise.     (I10) Benign Essential Hypertension  Comment: bp poor control. Pt dose not check bp at home. She state she is very sad and stress due to her cat is very sick and she could not reserve it. She takes medication as instructed. Denies cp, sob, palpitation, headache and burry vision  Plan: strongly advise to monitor bp at home. If bp average > 140/90 she needs follow-up to adjust the medication. She agrees.      (J45.40) Moderate persistent asthma without complication  Comment: stable. She uses controlled and barely need albuterol inh. Act score 24.   Plan: albuterol (PROAIR HFA/PROVENTIL HFA/VENTOLIN         HFA) 108 (90 Base) MCG/ACT inhaler        Continue current plan,     (E66.01) Morbid obesity (H)  Comment: bmi 38 with dm and htn. She gains weight   Plan: advise regular exercise and health diet to loss weight.       The longitudinal plan of care for the diagnosis(es)/condition(s) as documented were addressed during this visit. Due to the added complexity in care, I will continue to support Imelda in the subsequent management and with ongoing continuity of care.      BMI  Estimated body mass index is 38.25 kg/m  as calculated from the following:    Height as of this encounter: 1.676 m (5' 6\").    Weight as of this encounter: 107.5 kg (237 lb).   Weight management plan: Discussed healthy diet and exercise guidelines      Follow-up   No follow-ups on file.        Subjective   Imelda is a 74 year old, presenting for the following health issues:  Recheck Medication (DM med check)      5/5/2025     1:36 PM   Additional Questions   Roomed by Kamilla Pereyra   Accompanied " by none     History of Present Illness       Diabetes:   She presents for follow up of diabetes.  She is checking home blood glucose one time daily.   She checks blood glucose before meals.  Blood glucose is sometimes over 200 and never under 70.  When her blood glucose is low, the patient is asymptomatic for confusion, blurred vision, lethargy and reports not feeling dizzy, shaky, or weak.  She is concerned about other.   She is having weight gain.            She eats 2-3 servings of fruits and vegetables daily.She consumes 0 sweetened beverage(s) daily.She exercises with enough effort to increase her heart rate 10 to 19 minutes per day.  She exercises with enough effort to increase her heart rate 3 or less days per week.   She is taking medications regularly.          Hypertension Follow-up    Do you check your blood pressure regularly outside of the clinic? No   Are you following a low salt diet? Yes  Are your blood pressures ever more than 140 on the top number (systolic) OR more   than 90 on the bottom number (diastolic), for example 140/90? N/A    BP Readings from Last 2 Encounters:   05/05/25 (!) 150/70   11/08/24 136/82     Asthma      5/5/2025     1:33 PM   ACT Total Scores   ACT TOTAL SCORE (Goal Greater than or Equal to 20) 24    In the past 12 months, how many times did you visit the emergency room for your asthma without being admitted to the hospital? 0   In the past 12 months, how many times were you hospitalized overnight because of your asthma? 0       Patient-reported     Do you have any of the following symptoms? None of these symptoms (cough/noisy breathing/trouble with breathing)  What makes your asthma/breathing worse?  Upper respiratory illness and Pollens  Do you want more information about how to use your inhaler? No       Review of Systems  Constitutional, HEENT, cardiovascular, pulmonary, gi and gu systems are negative, except as otherwise noted.      Objective    BP (!) 150/70 (BP  "Location: Right arm, Patient Position: Sitting, Cuff Size: Adult Regular)   Pulse 76   Temp 98.4  F (36.9  C) (Oral)   Resp 16   Ht 1.676 m (5' 6\")   Wt 107.5 kg (237 lb)   LMP  (LMP Unknown)   SpO2 98%   Breastfeeding No   BMI 38.25 kg/m    Body mass index is 38.25 kg/m .  Physical Exam   GENERAL: alert and no distress  NECK: no adenopathy, no asymmetry, masses, or scars  RESP: lungs clear to auscultation - no rales, rhonchi or wheezes  CV: regular rate and rhythm, normal S1 S2, no S3 or S4, no murmur, click or rub, no peripheral edema  ABDOMEN: soft, nontender, no hepatosplenomegaly, no masses and bowel sounds normal  MS: no gross musculoskeletal defects noted, no edema            Signed Electronically by: Johanny Tavares MD    "

## 2025-05-07 ENCOUNTER — RESULTS FOLLOW-UP (OUTPATIENT)
Dept: FAMILY MEDICINE | Facility: CLINIC | Age: 75
End: 2025-05-07

## 2025-05-13 ENCOUNTER — ALLIED HEALTH/NURSE VISIT (OUTPATIENT)
Dept: EDUCATION SERVICES | Facility: CLINIC | Age: 75
End: 2025-05-13
Payer: COMMERCIAL

## 2025-05-13 VITALS
WEIGHT: 239 LBS | HEART RATE: 71 BPM | BODY MASS INDEX: 38.58 KG/M2 | SYSTOLIC BLOOD PRESSURE: 128 MMHG | DIASTOLIC BLOOD PRESSURE: 71 MMHG

## 2025-05-13 DIAGNOSIS — E11.9 TYPE 2 DIABETES MELLITUS WITHOUT COMPLICATION, WITHOUT LONG-TERM CURRENT USE OF INSULIN (H): Primary | ICD-10-CM

## 2025-05-13 PROCEDURE — 97803 MED NUTRITION INDIV SUBSEQ: CPT | Performed by: REGISTERED NURSE

## 2025-05-13 PROCEDURE — 3074F SYST BP LT 130 MM HG: CPT | Performed by: REGISTERED NURSE

## 2025-05-13 PROCEDURE — 3078F DIAST BP <80 MM HG: CPT | Performed by: REGISTERED NURSE

## 2025-05-13 NOTE — LETTER
5/13/2025         RE: Imelda El  [address]  [place] [address]        Dear Colleague,    Thank you for referring your patient, Imelda El, to the [facility]. Please see a copy of my visit note below.    Diabetes Self-Management Education & Support  Type of Service: In Person Visit      Assessment  4-month follow-up for diabetes education.  Imelda is frustrated that her weight is increasing since going off the plant-based diet.  Patient states she gained 15 pounds back.  Imelda is eating healthy foods and including small amounts of sweets.  She increased her movement significantly.  Many days she gets 8,000-10,000 steps.  Patient mentioned she is under some stress and not sleeping well.  Her blood sugars remain well-controlled.  Discussed eating less carbohydrate and more protein and vegetables to help manage weight and blood sugars.  Talked about diabetes distress as she is feeling what ever she does is not good enough.    Blood pressure/Weight/BG LOG  Date Blood pressure Weight  lbs Breakfast  Lunch  Dinner  Bedtime      Before After Before After Before After    5/5/25  234.8     87           5/6  233.4 140               5/7 139/76 238.6 124               5/8 146/86 234 138               5/9  152/75 233 142               5/10  234     86           5/11 133/86 234.2      114           5/12 134/76 234.6 140         5/13  236.8 122           Patient's most recent   Lab Results   Component Value Date    A1C 6.9 05/05/2025     is meeting goal of <7.0    Diabetes knowledge and skills assessment:   Patient is knowledgeable in diabetes management concepts related to: Healthy Eating, Being Active, Monitoring, Taking Medication, Problem Solving, Reducing Risks, and Healthy Coping    Based on learning assessment above, most appropriate setting for further diabetes education would be: Individual setting.    Care Plan and Education Provided:  Healthy eating, assistance making  lifestyle changes, and Diabetes distress.    Patient verbalized understanding of diabetes self-management education concepts discussed, opportunities for ongoing education and support, and recommendations provided today.    Plan  Continue to check blood sugar once per day.   Continue to check blood pressure daily.  Consider weighing yourself once per week instead of daily.  Continue to eat 3 small meals per day. Eat more protein and vegetables and less carbohydrate.  Continue current activity plan: aim for 2.5 hours of movement per week.  Continue to drink a lot of water.  7.   Follow-up in July.    Topics to cover at upcoming visits: Healthy Eating, Being Active, Healthy Coping, and assistance making lifestyle changes.    See Care Plan for co-developed, patient-state behavior change goals.    Education Materials Provided:  No new materials provided today    Subjective/Objective  Imelda is an 74 year old, presenting for the following diabetes education related to: Follow-up  Accompanied by: Self  Diabetes education in the past 24mo: Yes  Focus of Visit: Healthy Coping, Healthy Eating, Being Active  Diabetes type: Type 2  Date of diagnosis: 11/2/2023  Disease course: Stable  How confident are you filling out medical forms by yourself:: Extremely  Diabetes management related comments/concerns: Frustrated with weight.  Transportation concerns: No  Difficulty affording diabetes medication?: No  Difficulty affording diabetes testing supplies?: No  Other concerns: None  Cultural Influences/Ethnic Background:  Not  or     Diabetes Symptoms & Complications:  Diabetes Related Symptoms: None  Weight trend: Increasing  Symptom course: Stable  Disease course: Stable  Complications assessed today?: Yes  Autonomic neuropathy: No  CVA: No  Heart disease: No  Nephropathy: No  Peripheral neuropathy: No  Peripheral Vascular Disease: No  Retinopathy: No    Patient Problem List and Family Medical History reviewed for  "relevant medical history, current medical status, and diabetes risk factors.    Vitals:  /71   Pulse 71   Wt 108.4 kg (239 lb)   LMP  (LMP Unknown)   BMI 38.58 kg/m    Estimated body mass index is 38.58 kg/m  as calculated from the following:    Height as of 5/5/25: 1.676 m (5' 6\").    Weight as of this encounter: 108.4 kg (239 lb).     Wt Readings from Last 3 Encounters:   05/13/25 108.4 kg (239 lb)   05/05/25 107.5 kg (237 lb)   01/21/25 103.4 kg (228 lb)      Last 3 BP:   BP Readings from Last 3 Encounters:   05/13/25 128/71   05/05/25 (!) 150/70   11/08/24 136/82       History   Smoking Status     Never   Smokeless Tobacco     Never       Labs:  Lab Results   Component Value Date    A1C 6.9 05/05/2025     Lab Results   Component Value Date     11/08/2024     08/30/2021     Lab Results   Component Value Date    LDL 98 11/08/2024     Direct Measure HDL   Date Value Ref Range Status   11/08/2024 47 (L) >=50 mg/dL Final     GFR Estimate   Date Value Ref Range Status   11/08/2024 90 >60 mL/min/1.73m2 Final     Comment:     eGFR calculated using 2021 CKD-EPI equation.   09/28/2020 >60 >60 mL/min/1.73m2 Final     GFR Estimate If Black   Date Value Ref Range Status   09/28/2020 >60 >60 mL/min/1.73m2 Final     Lab Results   Component Value Date    CR 0.70 11/08/2024     Lab Results   Component Value Date    MICROL 43.6 05/05/2025    UMALCR 48.34 (H) 05/05/2025    UCRR 90.2 05/05/2025 5/13/2025   Healthy Eating   Healthy Eating Assessed Today Yes   Cultural/Hinduism diet restrictions? No   Do you have any food allergies or intolerances? No   Meal planning/habits Smaller portions;Heart healthy   Who cooks/prepares meals for you? Self   Who purchases food in  your home? Self   How many times a week on average do you eat food made away from home (restaurant/take-out)? 1   Meals include Breakfast;Lunch;Dinner   Breakfast 7-7:30 am wheat chex or mini wheats or raising bran with almond milk or " steel cut oats with 1 TB brown sugar, blueberries or strawberries, or raspberries, 4 oz watered down OJ   Lunch l11-12:00 noon eftovers or sandwich with roast beef, meeks, cookie or croissant chips.   Dinner supper at 5:30-6 pm pork stroganoff with onions and mushrooms, over noodles. water or unsweetened tea   Snacks nut thins crackers (made with nuts), apple or banana   Other Drinking lots of water, minimal snacking   Beverages Water;Tea;Coffee;Milk;Juice;Other (see Comments)   Please elaborate: sugar-free raspberry sun tea.   Has patient met with a dietitian in the past? Yes         5/13/2025   Being Active   Being Active Assessed Today Yes   Exercise: Yes       Tuesday, Thursday [employer], walking in stores for 60 minutes, mowing the lawn for 1.5 hours   Days per week of moderate to strenuous exercise (like a brisk walk) 4   On average, minutes per day of exercise at this level 60   How intense was your typical exercise?  Moderate (like brisk walking)   Exercise Minutes per Week 240   Barrier to exercise None         5/13/2025   Monitoring   Monitoring Assessed Today Yes   Did patient bring glucose meter to appointment?  No   Blood Glucose Meter ContourNext   Times checking blood sugar at home (number) 1   Times checking blood sugar at home (per) Day   Blood glucose trend Decreasing           5/13/2025   Taking Medications   Taking Medication Assessed Today Yes   Current Treatments Diet         5/13/2025   Problem Solving   Problem Solving Assessed Today Yes   Is the patient at risk for hypoglycemia? No   Patient carries a carbohydrate source Not Needed   Is the patient at risk for DKA? No   Hypoglycemia None   Hypoglycemia Complications None           5/13/2025   Reducing Risks   Reducing Risks Assessed Today Yes   Diabetes Risks Age over 45 years;Hyperlipidemia;Family History   CAD Risks Diabetes Mellitus;Dyslipidemia;Obesity;Post-menopausal;Sedentary lifestyle   Has dilated eye exam at least  once a year? Yes   Sees dentist every 6 months? Yes   Feet checked by healthcare provider in the last year? Yes         5/13/2025   Healthy Coping: Diabetes Distress Assessment   Healthy Coping Assessed Today Yes   I feel burned out by all of the attention and effort that diabetes demands of me. 1 - Not a Problem   It bothers me that diabetes seems to control my life. 1 - Not a Problem   I am frustrated that even when I do what I am supposed to for my diabetes, it doesn't seem to make a difference. 3 - A Moderate Problem   No matter how hard I try with my diabetes, it feels like it will never be good enough. 4 - A Serious Problem   I am so tired of having to worry about diabetes all the time. 3 - A Moderate Problem   When it comes to my diabetes, I often feel like a failure. 4 - A Serious Problem   It depresses me when I realize that my diabetes will likely never go away. 1 - Not a Problem   Living with diabetes is overwhelming for me. 1 - Not a Problem   T2 DDAS Total Score (0 - 1.9 Little or no DD, 2.0 - 2.9 Moderate DD,  3.0+ High DD) 2.3   Informal Support system: Children;Family;Friends;Spouse         Gisela Dash RD, LD, [facility]   Certified Diabetes Care and   [facility] Tuesdays and Thursdays  [facility] Wednesdays-virtual visits only    Time Spent: 60 minutes  Encounter Type: Individual    Any diabetes medication dose changes were made via the [facility] Standing [facility] under the patient's referring provider.

## 2025-05-13 NOTE — PROGRESS NOTES
Diabetes Self-Management Education & Support  Type of Service: In Person Visit      Assessment  4-month follow-up for diabetes education.  Imelda is frustrated that her weight is increasing since going off the plant-based diet.  Patient states she gained 15 pounds back.  Imelda is eating healthy foods and including small amounts of sweets.  She increased her movement significantly.  Many days she gets 8,000-10,000 steps.  Patient mentioned she is under some stress and not sleeping well.  Her blood sugars remain well-controlled.  Discussed eating less carbohydrate and more protein and vegetables to help manage weight and blood sugars.  Talked about diabetes distress as she is feeling what ever she does is not good enough.    Blood pressure/Weight/BG LOG  Date Blood pressure Weight  lbs Breakfast  Lunch  Dinner  Bedtime      Before After Before After Before After    5/5/25  234.8     87           5/6  233.4 140               5/7 139/76 238.6 124               5/8 146/86 234 138               5/9  152/75 233 142               5/10  234     86           5/11 133/86 234.2      114           5/12 134/76 234.6 140         5/13  236.8 122           Patient's most recent   Lab Results   Component Value Date    A1C 6.9 05/05/2025     is meeting goal of <7.0    Diabetes knowledge and skills assessment:   Patient is knowledgeable in diabetes management concepts related to: Healthy Eating, Being Active, Monitoring, Taking Medication, Problem Solving, Reducing Risks, and Healthy Coping    Based on learning assessment above, most appropriate setting for further diabetes education would be: Individual setting.    Care Plan and Education Provided:  Healthy eating, assistance making lifestyle changes, and Diabetes distress.    Patient verbalized understanding of diabetes self-management education concepts discussed, opportunities for ongoing education and support, and recommendations provided today.    Plan  Continue to check blood  sugar once per day.   Continue to check blood pressure daily.  Consider weighing yourself once per week instead of daily.  Continue to eat 3 small meals per day. Eat more protein and vegetables and less carbohydrate.  Continue current activity plan: aim for 2.5 hours of movement per week.  Continue to drink a lot of water.  7.   Follow-up in July.    Topics to cover at upcoming visits: Healthy Eating, Being Active, Healthy Coping, and assistance making lifestyle changes.    See Care Plan for co-developed, patient-state behavior change goals.    Education Materials Provided:  No new materials provided today    Subjective/Objective  Imelda is an 74 year old, presenting for the following diabetes education related to: Follow-up  Accompanied by: Self  Diabetes education in the past 24mo: Yes  Focus of Visit: Healthy Coping, Healthy Eating, Being Active  Diabetes type: Type 2  Date of diagnosis: 11/2/2023  Disease course: Stable  How confident are you filling out medical forms by yourself:: Extremely  Diabetes management related comments/concerns: Frustrated with weight.  Transportation concerns: No  Difficulty affording diabetes medication?: No  Difficulty affording diabetes testing supplies?: No  Other concerns: None  Cultural Influences/Ethnic Background:  Not  or     Diabetes Symptoms & Complications:  Diabetes Related Symptoms: None  Weight trend: Increasing  Symptom course: Stable  Disease course: Stable  Complications assessed today?: Yes  Autonomic neuropathy: No  CVA: No  Heart disease: No  Nephropathy: No  Peripheral neuropathy: No  Peripheral Vascular Disease: No  Retinopathy: No    Patient Problem List and Family Medical History reviewed for relevant medical history, current medical status, and diabetes risk factors.    Vitals:  /71   Pulse 71   Wt 108.4 kg (239 lb)   LMP  (LMP Unknown)   BMI 38.58 kg/m    Estimated body mass index is 38.58 kg/m  as calculated from the following:     "Height as of 5/5/25: 1.676 m (5' 6\").    Weight as of this encounter: 108.4 kg (239 lb).     Wt Readings from Last 3 Encounters:   05/13/25 108.4 kg (239 lb)   05/05/25 107.5 kg (237 lb)   01/21/25 103.4 kg (228 lb)      Last 3 BP:   BP Readings from Last 3 Encounters:   05/13/25 128/71   05/05/25 (!) 150/70   11/08/24 136/82       History   Smoking Status    Never   Smokeless Tobacco    Never       Labs:  Lab Results   Component Value Date    A1C 6.9 05/05/2025     Lab Results   Component Value Date     11/08/2024     08/30/2021     Lab Results   Component Value Date    LDL 98 11/08/2024     Direct Measure HDL   Date Value Ref Range Status   11/08/2024 47 (L) >=50 mg/dL Final     GFR Estimate   Date Value Ref Range Status   11/08/2024 90 >60 mL/min/1.73m2 Final     Comment:     eGFR calculated using 2021 CKD-EPI equation.   09/28/2020 >60 >60 mL/min/1.73m2 Final     GFR Estimate If Black   Date Value Ref Range Status   09/28/2020 >60 >60 mL/min/1.73m2 Final     Lab Results   Component Value Date    CR 0.70 11/08/2024     Lab Results   Component Value Date    MICROL 43.6 05/05/2025    UMALCR 48.34 (H) 05/05/2025    UCRR 90.2 05/05/2025 5/13/2025   Healthy Eating   Healthy Eating Assessed Today Yes   Cultural/Mormon diet restrictions? No   Do you have any food allergies or intolerances? No   Meal planning/habits Smaller portions;Heart healthy   Who cooks/prepares meals for you? Self   Who purchases food in  your home? Self   How many times a week on average do you eat food made away from home (restaurant/take-out)? 1   Meals include Breakfast;Lunch;Dinner   Breakfast 7-7:30 am wheat chex or mini wheats or raising bran with almond milk or steel cut oats with 1 TB brown sugar, blueberries or strawberries, or raspberries, 4 oz watered down OJ   Lunch l11-12:00 noon eftovers or sandwich with roast beef, meeks, cookie or croissant chips.   Dinner supper at 5:30-6 pm pork stroganoff with onions " and mushrooms, over noodles. water or unsweetened tea   Snacks nut thins crackers (made with nuts), apple or banana   Other Drinking lots of water, minimal snacking   Beverages Water;Tea;Coffee;Milk;Juice;Other (see Comments)   Please elaborate: sugar-free raspberry sun tea.   Has patient met with a dietitian in the past? Yes         5/13/2025   Being Active   Being Active Assessed Today Yes   Exercise: Yes       Tuesday, Thursday [employer] [facility] [employer], walking in stores for 60 minutes, mowing the lawn for 1.5 hours   Days per week of moderate to strenuous exercise (like a brisk walk) 4   On average, minutes per day of exercise at this level 60   How intense was your typical exercise?  Moderate (like brisk walking)   Exercise Minutes per Week 240   Barrier to exercise None         5/13/2025   Monitoring   Monitoring Assessed Today Yes   Did patient bring glucose meter to appointment?  No   Blood Glucose Meter ContourNext   Times checking blood sugar at home (number) 1   Times checking blood sugar at home (per) Day   Blood glucose trend Decreasing           5/13/2025   Taking Medications   Taking Medication Assessed Today Yes   Current Treatments Diet         5/13/2025   Problem Solving   Problem Solving Assessed Today Yes   Is the patient at risk for hypoglycemia? No   Patient carries a carbohydrate source Not Needed   Is the patient at risk for DKA? No   Hypoglycemia None   Hypoglycemia Complications None           5/13/2025   Reducing Risks   Reducing Risks Assessed Today Yes   Diabetes Risks Age over 45 years;Hyperlipidemia;Family History   CAD Risks Diabetes Mellitus;Dyslipidemia;Obesity;Post-menopausal;Sedentary lifestyle   Has dilated eye exam at least once a year? Yes   Sees dentist every 6 months? Yes   Feet checked by healthcare provider in the last year? Yes         5/13/2025   Healthy Coping: Diabetes Distress Assessment   Healthy Coping Assessed Today Yes   I feel burned out by all of the  attention and effort that diabetes demands of me. 1 - Not a Problem   It bothers me that diabetes seems to control my life. 1 - Not a Problem   I am frustrated that even when I do what I am supposed to for my diabetes, it doesn't seem to make a difference. 3 - A Moderate Problem   No matter how hard I try with my diabetes, it feels like it will never be good enough. 4 - A Serious Problem   I am so tired of having to worry about diabetes all the time. 3 - A Moderate Problem   When it comes to my diabetes, I often feel like a failure. 4 - A Serious Problem   It depresses me when I realize that my diabetes will likely never go away. 1 - Not a Problem   Living with diabetes is overwhelming for me. 1 - Not a Problem   T2 DDAS Total Score (0 - 1.9 Little or no DD, 2.0 - 2.9 Moderate DD,  3.0+ High DD) 2.3   Informal Support system: Children;Family;Friends;Spouse         Gisela Dash RD, LD, [facility]   Certified Diabetes Care and   [facility] Tuesdays and Thursdays  [facility] Wednesdays-virtual visits only    Time Spent: 60 minutes  Encounter Type: Individual    Any diabetes medication dose changes were made via the [facility] Standing Orders under the patient's referring provider.

## 2025-05-13 NOTE — PATIENT INSTRUCTIONS
Continue to check blood sugar once per day.   Continue to check blood pressure daily.  Consider weighing yourself once per week instead of daily.  Continue to eat 3 small meals per day. Eat more protein and vegetables and less carbohydrate.  Continue current activity plan: aim for 2.5 hours of movement per week.  Continue to drink a lot of water.  7.  Follow-up in July.    Blood sugar goals:  Before meals   2 hours after meals: less 180  Bedtime:     A1c: less than 7.0% (estimated average blood sugar of 154 mg/dl)    [place] [facility]:  For Your Diabetes Education and Nutrition Appointments Call:  [phone]   For Diabetes Education or Nutrition Related Questions:   Phone: [phone]  Send Fengguo Message   If you need a medication refill please contact your pharmacy. Please allow 3 business days for your refills to be completed.  I truly appreciate your feedback on our appointment together. You will either receive an email, text message, and/or phone call survey about today's appointment. Please complete this survey as soon as you are able so I can continue to improve upon my practice. Thank you!

## 2025-05-14 DIAGNOSIS — E11.9 TYPE 2 DIABETES MELLITUS WITHOUT COMPLICATION, WITHOUT LONG-TERM CURRENT USE OF INSULIN (H): Primary | ICD-10-CM

## 2025-05-15 ENCOUNTER — PATIENT OUTREACH (OUTPATIENT)
Dept: CARE COORDINATION | Facility: CLINIC | Age: 75
End: 2025-05-15
Payer: COMMERCIAL

## 2025-05-19 ENCOUNTER — PATIENT OUTREACH (OUTPATIENT)
Dept: CARE COORDINATION | Facility: CLINIC | Age: 75
End: 2025-05-19
Payer: COMMERCIAL

## 2025-07-15 ENCOUNTER — ALLIED HEALTH/NURSE VISIT (OUTPATIENT)
Dept: EDUCATION SERVICES | Facility: CLINIC | Age: 75
End: 2025-07-15
Payer: COMMERCIAL

## 2025-07-15 VITALS — BODY MASS INDEX: 39.03 KG/M2 | WEIGHT: 241.8 LBS

## 2025-07-15 DIAGNOSIS — E11.9 TYPE 2 DIABETES MELLITUS WITHOUT COMPLICATION, WITHOUT LONG-TERM CURRENT USE OF INSULIN (H): ICD-10-CM

## 2025-07-15 PROCEDURE — 97803 MED NUTRITION INDIV SUBSEQ: CPT | Performed by: REGISTERED NURSE

## 2025-07-15 NOTE — LETTER
7/15/2025         RE: Imelda El  [address]  [place] [address]        Dear Colleague,    Thank you for referring your patient, Imelda El, to the [facility]. Please see a copy of my visit note below.    Diabetes Self-Management Education & Support  Type of Service: In Person Visit      Assessment  2-month follow-up for diabetes education counseling.  Imelda continues to check her blood sugar, blood pressure and weight every day.  Her fasting blood sugars are stable around 125-130 mg/dl.  Her blood pressure is within target.  Her weight is fluctuating.  Patient continues to stay active in the garden and with her exercise class twice per week along with walking.  She is paying attention to what she is eating and why. Imelda is trying to include more protein and vegetables to help stay fall.  Patient has many vegetables in her garden including tomatoes, zucchini, bell peppers, banana peppers, sugar snack peas and more.    FB, 121, 128, 136, 98, 137, 138, 130, 125, 131, 127, 132, 127, 132.    Patient's most recent   Lab Results   Component Value Date    A1C 6.9 2025     is meeting goal of <7.0    Diabetes knowledge and skills assessment:   Patient is knowledgeable in diabetes management concepts related to: Healthy Eating, Being Active, and Monitoring    Based on learning assessment above, most appropriate setting for further diabetes education would be: Individual setting.    Care Plan and Education Provided:  Healthy Eating: Portion control and Weight Management  Being Active: Finding a physical activity routine that works for you  Monitoring: Log and interpret results    Patient verbalized understanding of diabetes self-management education concepts discussed, opportunities for ongoing education and support, and recommendations provided today.    Plan  Continue to check blood sugar once per day.  Continue to eat 3 small meals per day and snacks if meals  are more than 4-5 hours apart.  Including protein at meals and snacks to help stay full.  Try Orgain protein drink (dairy-based protein, sweetened with monk fruit and cocoa powder). 160 calories, 30 g protein, 7 g carb).   Continue to stay active every day.  Continue to drink a lot of water to stay hydrated, protect your kidneys, flush out the additional sugar and fat.  Follow-up in 2 months.    Topics to cover at upcoming visits: Assistance making lifestyle changes.  All diabetes education topics completed.    See Care Plan for co-developed, patient-state behavior change goals.    Education Materials Provided:  No new materials provided today    Subjective/Objective  Imelda is an 74 year old, presenting for the following diabetes education related to: Follow-up  Accompanied by: Self  Diabetes education in the past 24mo: Yes  Focus of Visit: Healthy Eating, Being Active, Assistance w/ making life changes  Diabetes type: Type 2  Date of diagnosis: 11/2/2023  Disease course: Stable  How confident are you filling out medical forms by yourself:: Extremely  Diabetes management related comments/concerns: Eating more protein to help stay full.  Transportation concerns: No  Difficulty affording diabetes medication?: No  Difficulty affording diabetes testing supplies?: No  Other concerns: None  Cultural Influences/Ethnic Background:  Not  or     Diabetes Symptoms & Complications:  Diabetes Related Symptoms: None  Weight trend: Fluctuating  Symptom course: Stable  Disease course: Stable  Complications assessed today?: Yes  Autonomic neuropathy: No  CVA: No  Heart disease: No  Nephropathy: No  Peripheral neuropathy: No  Peripheral Vascular Disease: No  Retinopathy: No    Patient Problem List and Family Medical History reviewed for relevant medical history, current medical status, and diabetes risk factors.    Vitals:  Wt 109.7 kg (241 lb 12.8 oz)   LMP  (LMP Unknown)   BMI 39.03 kg/m    Estimated body mass index  "is 39.03 kg/m  as calculated from the following:    Height as of 5/5/25: 1.676 m (5' 6\").    Weight as of this encounter: 109.7 kg (241 lb 12.8 oz).   Last 3 BP:     Weight at our first visit: 250 lb (9/14/22)  Wt Readings from Last 3 Encounters:   07/15/25 109.7 kg (241 lb 12.8 oz)   05/13/25 108.4 kg (239 lb)   05/05/25 107.5 kg (237 lb)      BP Readings from Last 3 Encounters:   05/13/25 128/71   05/05/25 (!) 150/70   11/08/24 136/82       History   Smoking Status     Never   Smokeless Tobacco     Never       Labs:  Lab Results   Component Value Date    A1C 6.9 05/05/2025     Lab Results   Component Value Date     11/08/2024     08/30/2021     Lab Results   Component Value Date    LDL 98 11/08/2024     Direct Measure HDL   Date Value Ref Range Status   11/08/2024 47 (L) >=50 mg/dL Final     GFR Estimate   Date Value Ref Range Status   11/08/2024 90 >60 mL/min/1.73m2 Final     Comment:     eGFR calculated using 2021 CKD-EPI equation.   09/28/2020 >60 >60 mL/min/1.73m2 Final     GFR Estimate If Black   Date Value Ref Range Status   09/28/2020 >60 >60 mL/min/1.73m2 Final     Lab Results   Component Value Date    CR 0.70 11/08/2024     Lab Results   Component Value Date    MICROL 43.6 05/05/2025    UMALCR 48.34 (H) 05/05/2025    UCRR 90.2 05/05/2025           7/15/2025   Healthy Eating   Healthy Eating Assessed Today Yes   Cultural/Jew diet restrictions? No   Do you have any food allergies or intolerances? No   Meal planning/habits Smaller portions;Heart healthy;Low salt   Who cooks/prepares meals for you? Self   Who purchases food in  your home? Self   How many times a week on average do you eat food made away from home (restaurant/take-out)? 1   Meals include Breakfast;Lunch;Dinner   Breakfast 7-7:30 am wheat chex or mini wheats or raisin bran with almond milk  or sun tea with sweet-n-low.   Lunch 11:30 am leftovers or ham and cheese sandwich, water   Dinner supper at 5:30-6 pm Chicken stirfry " over instant white rice water or unsweetened tea   Snacks peanut butter or evangelista crackers or Gilber[employer] peanut butter cup   Other Drinking lots of water, minimal snacking. Still avoiding dairy.   Beverages Water;Tea;Other (see Comments);Soda   Please elaborate: Occasional Regular Pepsi   Has patient met with a dietitian in the past? Yes         7/15/2025   Being Active   Being Active Assessed Today Yes   Exercise: Yes       Tuesday, Thursday [employer], gardening, walking in stores for 60 minutes, mowing the lawn for 1.5 hours   Days per week of moderate to strenuous exercise (like a brisk walk) 4   On average, minutes per day of exercise at this level 60   How intense was your typical exercise?  Moderate (like brisk walking)   Exercise Minutes per Week 240   Barrier to exercise None         7/15/2025   Monitoring   Monitoring Assessed Today Yes   Did patient bring glucose meter to appointment?  No   Blood Glucose Meter ContourNext   Times checking blood sugar at home (number) 1   Times checking blood sugar at home (per) Day   Blood glucose trend No change     ***        7/15/2025   Taking Medications   Taking Medication Assessed Today Yes   Current Treatments Diet         7/15/2025   Problem Solving   Problem Solving Assessed Today Yes   Is the patient at risk for hypoglycemia? No   Patient carries a carbohydrate source Not Needed   Is the patient at risk for DKA? No   Hypoglycemia None   Hypoglycemia Complications None           7/15/2025   Reducing Risks   Reducing Risks Assessed Today Yes   Diabetes Risks Age over 45 years;Hyperlipidemia;Family History   CAD Risks Diabetes Mellitus;Dyslipidemia;Obesity;Post-menopausal;Sedentary lifestyle   Has dilated eye exam at least once a year? Yes   Sees dentist every 6 months? Yes   Feet checked by healthcare provider in the last year? Yes         7/15/2025   Healthy Coping: Diabetes Distress Assessment   Healthy Coping Assessed Today Yes   I feel burned  out by all of the attention and effort that diabetes demands of me. 1 - Not a Problem   It bothers me that diabetes seems to control my life. 1 - Not a Problem   I am frustrated that even when I do what I am supposed to for my diabetes, it doesn't seem to make a difference. 3 - A Moderate Problem   No matter how hard I try with my diabetes, it feels like it will never be good enough. 4 - A Serious Problem   I am so tired of having to worry about diabetes all the time. 3 - A Moderate Problem   When it comes to my diabetes, I often feel like a failure. 4 - A Serious Problem   It depresses me when I realize that my diabetes will likely never go away. 1 - Not a Problem   Living with diabetes is overwhelming for me. 1 - Not a Problem   T2 DDAS Total Score (0 - 1.9 Little or no DD, 2.0 - 2.9 Moderate DD,  3.0+ High DD) 2.3   Informal Support system: Children;Family;Friends;Spouse         Gisela Dash RD, LD, [facility]   Certified Diabetes Care and   [facility] Tuesdays and Thursdays  [facility] Wednesdays-virtual visits only    Time Spent: 45 minutes  Encounter Type: Individual    Any diabetes medication dose changes were made via the [facility] Standing Orders under the patient's referring provider.    This note has been dictated using voice recognition software. Any grammatical or context distortions are unintentional and inherent to the software.

## 2025-07-15 NOTE — PATIENT INSTRUCTIONS
Continue to check blood sugar once per day.  Continue to eat 3 small meals per day and snacks if meals are more than 4-5 hours apart.  Including protein at meals and snacks to help stay full.  Try Orgain protein drink (dairy-based protein, sweetened with monk fruit and cocoa powder). 160 calories, 30 g protein, 7 g carb).   Continue to stay active every day.  Continue to drink a lot of water to stay hydrated, protect your kidneys, flush out the additional sugar and fat.  Follow-up in 2 months.    Blood sugar goals:  Before meals   2 hours after meals: less 180  Bedtime:     A1c: less than 7.0% (estimated average blood sugar of 154 mg/dl)

## 2025-07-15 NOTE — PROGRESS NOTES
Diabetes Self-Management Education & Support  Type of Service: In Person Visit      Assessment  2-month follow-up for diabetes education counseling.  Imelda continues to check her blood sugar, blood pressure and weight every day.  Her fasting blood sugars are stable around 125-130 mg/dl.  Her blood pressure is within target.  Her weight is fluctuating.  Patient continues to stay active in the garden and with her exercise class twice per week along with walking.  She is paying attention to what she is eating and why. Imelda is trying to include more protein and vegetables to help stay fall.  Patient has many vegetables in her garden including tomatoes, zucchini, bell peppers, banana peppers, sugar snack peas and more.    FB, 121, 128, 136, 98, 137, 138, 130, 125, 131, 127, 132, 127, 132.    Patient's most recent   Lab Results   Component Value Date    A1C 6.9 2025     is meeting goal of <7.0    Diabetes knowledge and skills assessment:   Patient is knowledgeable in diabetes management concepts related to: Healthy Eating, Being Active, and Monitoring    Based on learning assessment above, most appropriate setting for further diabetes education would be: Individual setting.    Care Plan and Education Provided:  Healthy Eating: Portion control and Weight Management  Being Active: Finding a physical activity routine that works for you  Monitoring: Log and interpret results    Patient verbalized understanding of diabetes self-management education concepts discussed, opportunities for ongoing education and support, and recommendations provided today.    Plan  Continue to check blood sugar once per day.  Continue to eat 3 small meals per day and snacks if meals are more than 4-5 hours apart.  Including protein at meals and snacks to help stay full.  Try Orgain protein drink (dairy-based protein, sweetened with monk fruit and cocoa powder). 160 calories, 30 g protein, 7 g carb).   Continue to stay active every  "day.  Continue to drink a lot of water to stay hydrated, protect your kidneys, flush out the additional sugar and fat.  Follow-up in 2 months.    Topics to cover at upcoming visits: Assistance making lifestyle changes.  All diabetes education topics completed.    See Care Plan for co-developed, patient-state behavior change goals.    Education Materials Provided:  No new materials provided today    Subjective/Objective  Imelda is an 74 year old, presenting for the following diabetes education related to: Follow-up  Accompanied by: Self  Diabetes education in the past 24mo: Yes  Focus of Visit: Healthy Eating, Being Active, Assistance w/ making life changes  Diabetes type: Type 2  Date of diagnosis: 11/2/2023  Disease course: Stable  How confident are you filling out medical forms by yourself:: Extremely  Diabetes management related comments/concerns: Eating more protein to help stay full.  Transportation concerns: No  Difficulty affording diabetes medication?: No  Difficulty affording diabetes testing supplies?: No  Other concerns: None  Cultural Influences/Ethnic Background:  Not  or     Diabetes Symptoms & Complications:  Diabetes Related Symptoms: None  Weight trend: Fluctuating  Symptom course: Stable  Disease course: Stable  Complications assessed today?: Yes  Autonomic neuropathy: No  CVA: No  Heart disease: No  Nephropathy: No  Peripheral neuropathy: No  Peripheral Vascular Disease: No  Retinopathy: No    Patient Problem List and Family Medical History reviewed for relevant medical history, current medical status, and diabetes risk factors.    Vitals:  Wt 109.7 kg (241 lb 12.8 oz)   LMP  (LMP Unknown)   BMI 39.03 kg/m    Estimated body mass index is 39.03 kg/m  as calculated from the following:    Height as of 5/5/25: 1.676 m (5' 6\").    Weight as of this encounter: 109.7 kg (241 lb 12.8 oz).   Last 3 BP:     Weight at our first visit: 250 lb (9/14/22)  Wt Readings from Last 3 Encounters: "   07/15/25 109.7 kg (241 lb 12.8 oz)   05/13/25 108.4 kg (239 lb)   05/05/25 107.5 kg (237 lb)      BP Readings from Last 3 Encounters:   05/13/25 128/71   05/05/25 (!) 150/70   11/08/24 136/82       History   Smoking Status    Never   Smokeless Tobacco    Never       Labs:  Lab Results   Component Value Date    A1C 6.9 05/05/2025     Lab Results   Component Value Date     11/08/2024     08/30/2021     Lab Results   Component Value Date    LDL 98 11/08/2024     Direct Measure HDL   Date Value Ref Range Status   11/08/2024 47 (L) >=50 mg/dL Final     GFR Estimate   Date Value Ref Range Status   11/08/2024 90 >60 mL/min/1.73m2 Final     Comment:     eGFR calculated using 2021 CKD-EPI equation.   09/28/2020 >60 >60 mL/min/1.73m2 Final     GFR Estimate If Black   Date Value Ref Range Status   09/28/2020 >60 >60 mL/min/1.73m2 Final     Lab Results   Component Value Date    CR 0.70 11/08/2024     Lab Results   Component Value Date    MICROL 43.6 05/05/2025    UMALCR 48.34 (H) 05/05/2025    UCRR 90.2 05/05/2025           7/15/2025   Healthy Eating   Healthy Eating Assessed Today Yes   Cultural/Mormon diet restrictions? No   Do you have any food allergies or intolerances? No   Meal planning/habits Smaller portions;Heart healthy;Low salt   Who cooks/prepares meals for you? Self   Who purchases food in  your home? Self   How many times a week on average do you eat food made away from home (restaurant/take-out)? 1   Meals include Breakfast;Lunch;Dinner   Breakfast 7-7:30 am wheat chex or mini wheats or raisin bran with almond milk  or sun tea with sweet-n-low.   Lunch 11:30 am leftovers or ham and cheese sandwich, water   Dinner supper at 5:30-6 pm Chicken stirfry over instant white rice water or unsweetened tea   Snacks peanut butter or evangelista crackers or Gilberto's peanut butter cup   Other Drinking lots of water, minimal snacking. Still avoiding dairy.   Beverages Water;Tea;Other (see Comments);Soda   Please  elaborate: Occasional Regular Pepsi   Has patient met with a dietitian in the past? Yes         7/15/2025   Being Active   Being Active Assessed Today Yes   Exercise: Yes       Tuesday, Thursday [employer] [facility] [employer], gardening, walking in stores for 60 minutes, mowing the lawn for 1.5 hours   Days per week of moderate to strenuous exercise (like a brisk walk) 4   On average, minutes per day of exercise at this level 60   How intense was your typical exercise?  Moderate (like brisk walking)   Exercise Minutes per Week 240   Barrier to exercise None         7/15/2025   Monitoring   Monitoring Assessed Today Yes   Did patient bring glucose meter to appointment?  No   Blood Glucose Meter ContourNext   Times checking blood sugar at home (number) 1   Times checking blood sugar at home (per) Day   Blood glucose trend No change           7/15/2025   Taking Medications   Taking Medication Assessed Today Yes   Current Treatments Diet         7/15/2025   Problem Solving   Problem Solving Assessed Today Yes   Is the patient at risk for hypoglycemia? No   Patient carries a carbohydrate source Not Needed   Is the patient at risk for DKA? No   Hypoglycemia None   Hypoglycemia Complications None           7/15/2025   Reducing Risks   Reducing Risks Assessed Today Yes   Diabetes Risks Age over 45 years;Hyperlipidemia;Family History   CAD Risks Diabetes Mellitus;Dyslipidemia;Obesity;Post-menopausal;Sedentary lifestyle   Has dilated eye exam at least once a year? Yes   Sees dentist every 6 months? Yes   Feet checked by healthcare provider in the last year? Yes         7/15/2025   Healthy Coping: Diabetes Distress Assessment   Healthy Coping Assessed Today Yes   I feel burned out by all of the attention and effort that diabetes demands of me. 1 - Not a Problem   It bothers me that diabetes seems to control my life. 1 - Not a Problem   I am frustrated that even when I do what I am supposed to for my diabetes, it doesn't seem to  make a difference. 3 - A Moderate Problem   No matter how hard I try with my diabetes, it feels like it will never be good enough. 4 - A Serious Problem   I am so tired of having to worry about diabetes all the time. 3 - A Moderate Problem   When it comes to my diabetes, I often feel like a failure. 4 - A Serious Problem   It depresses me when I realize that my diabetes will likely never go away. 1 - Not a Problem   Living with diabetes is overwhelming for me. 1 - Not a Problem   T2 DDAS Total Score (0 - 1.9 Little or no DD, 2.0 - 2.9 Moderate DD,  3.0+ High DD) 2.3   Informal Support system: Children;Family;Friends;Spouse         Gisela Dash RD, LD, [facility]   Certified Diabetes Care and   [facility] Tuesdays and Thursdays  [facility] Wednesdays-virtual visits only    Time Spent: 45 minutes  Encounter Type: Individual    Any diabetes medication dose changes were made via the [facility] Standing Orders under the patient's referring provider.    This note has been dictated using voice recognition software. Any grammatical or context distortions are unintentional and inherent to the software.

## 2025-07-15 NOTE — LETTER
7/15/2025         RE: Imelda El  [address]  [place] [address]        Dear Colleague,    Thank you for referring your patient, Imelda El, to the [facility]. Please see a copy of my visit note below.    Diabetes Self-Management Education & Support  Type of Service: In Person Visit      Assessment  2-month follow-up for diabetes education counseling.  Imelda continues to check her blood sugar, blood pressure and weight every day.  Her fasting blood sugars are stable around 125-130 mg/dl.  Her blood pressure is within target.  Her weight is fluctuating.  Patient continues to stay active in the garden and with her exercise class twice per week along with walking.  She is paying attention to what she is eating and why. Imelda is trying to include more protein and vegetables to help stay fall.  Patient has many vegetables in her garden including tomatoes, zucchini, bell peppers, banana peppers, sugar snack peas and more.    FB, 121, 128, 136, 98, 137, 138, 130, 125, 131, 127, 132, 127, 132.    Patient's most recent   Lab Results   Component Value Date    A1C 6.9 2025     is meeting goal of <7.0    Diabetes knowledge and skills assessment:   Patient is knowledgeable in diabetes management concepts related to: Healthy Eating, Being Active, and Monitoring    Based on learning assessment above, most appropriate setting for further diabetes education would be: Individual setting.    Care Plan and Education Provided:  Healthy Eating: Portion control and Weight Management  Being Active: Finding a physical activity routine that works for you  Monitoring: Log and interpret results    Patient verbalized understanding of diabetes self-management education concepts discussed, opportunities for ongoing education and support, and recommendations provided today.    Plan  Continue to check blood sugar once per day.  Continue to eat 3 small meals per day and snacks if meals  are more than 4-5 hours apart.  Including protein at meals and snacks to help stay full.  Try Orgain protein drink (dairy-based protein, sweetened with monk fruit and cocoa powder). 160 calories, 30 g protein, 7 g carb).   Continue to stay active every day.  Continue to drink a lot of water to stay hydrated, protect your kidneys, flush out the additional sugar and fat.  Follow-up in 2 months.    Topics to cover at upcoming visits: Assistance making lifestyle changes.  All diabetes education topics completed.    See Care Plan for co-developed, patient-state behavior change goals.    Education Materials Provided:  No new materials provided today    Subjective/Objective  Imelda is an 74 year old, presenting for the following diabetes education related to: Follow-up  Accompanied by: Self  Diabetes education in the past 24mo: Yes  Focus of Visit: Healthy Eating, Being Active, Assistance w/ making life changes  Diabetes type: Type 2  Date of diagnosis: 11/2/2023  Disease course: Stable  How confident are you filling out medical forms by yourself:: Extremely  Diabetes management related comments/concerns: Eating more protein to help stay full.  Transportation concerns: No  Difficulty affording diabetes medication?: No  Difficulty affording diabetes testing supplies?: No  Other concerns: None  Cultural Influences/Ethnic Background:  Not  or     Diabetes Symptoms & Complications:  Diabetes Related Symptoms: None  Weight trend: Fluctuating  Symptom course: Stable  Disease course: Stable  Complications assessed today?: Yes  Autonomic neuropathy: No  CVA: No  Heart disease: No  Nephropathy: No  Peripheral neuropathy: No  Peripheral Vascular Disease: No  Retinopathy: No    Patient Problem List and Family Medical History reviewed for relevant medical history, current medical status, and diabetes risk factors.    Vitals:  Wt 109.7 kg (241 lb 12.8 oz)   LMP  (LMP Unknown)   BMI 39.03 kg/m    Estimated body mass index  "is 39.03 kg/m  as calculated from the following:    Height as of 5/5/25: 1.676 m (5' 6\").    Weight as of this encounter: 109.7 kg (241 lb 12.8 oz).   Last 3 BP:     Weight at our first visit: 250 lb (9/14/22)  Wt Readings from Last 3 Encounters:   07/15/25 109.7 kg (241 lb 12.8 oz)   05/13/25 108.4 kg (239 lb)   05/05/25 107.5 kg (237 lb)      BP Readings from Last 3 Encounters:   05/13/25 128/71   05/05/25 (!) 150/70   11/08/24 136/82       History   Smoking Status    Never   Smokeless Tobacco    Never       Labs:  Lab Results   Component Value Date    A1C 6.9 05/05/2025     Lab Results   Component Value Date     11/08/2024     08/30/2021     Lab Results   Component Value Date    LDL 98 11/08/2024     Direct Measure HDL   Date Value Ref Range Status   11/08/2024 47 (L) >=50 mg/dL Final     GFR Estimate   Date Value Ref Range Status   11/08/2024 90 >60 mL/min/1.73m2 Final     Comment:     eGFR calculated using 2021 CKD-EPI equation.   09/28/2020 >60 >60 mL/min/1.73m2 Final     GFR Estimate If Black   Date Value Ref Range Status   09/28/2020 >60 >60 mL/min/1.73m2 Final     Lab Results   Component Value Date    CR 0.70 11/08/2024     Lab Results   Component Value Date    MICROL 43.6 05/05/2025    UMALCR 48.34 (H) 05/05/2025    UCRR 90.2 05/05/2025           7/15/2025   Healthy Eating   Healthy Eating Assessed Today Yes   Cultural/Gnosticist diet restrictions? No   Do you have any food allergies or intolerances? No   Meal planning/habits Smaller portions;Heart healthy;Low salt   Who cooks/prepares meals for you? Self   Who purchases food in  your home? Self   How many times a week on average do you eat food made away from home (restaurant/take-out)? 1   Meals include Breakfast;Lunch;Dinner   Breakfast 7-7:30 am wheat chex or mini wheats or raisin bran with almond milk  or sun tea with sweet-n-low.   Lunch 11:30 am leftovers or ham and cheese sandwich, water   Dinner supper at 5:30-6 pm Chicken stirfry " over instant white rice water or unsweetened tea   Snacks peanut butter or evangelista crackers or Gilberto's peanut butter cup   Other Drinking lots of water, minimal snacking. Still avoiding dairy.   Beverages Water;Tea;Other (see Comments);Soda   Please elaborate: Occasional Regular Pepsi   Has patient met with a dietitian in the past? Yes         7/15/2025   Being Active   Being Active Assessed Today Yes   Exercise: Yes       Tuesday, Thursday [employer] [facility] [employer], gardening, walking in stores for 60 minutes, mowing the lawn for 1.5 hours   Days per week of moderate to strenuous exercise (like a brisk walk) 4   On average, minutes per day of exercise at this level 60   How intense was your typical exercise?  Moderate (like brisk walking)   Exercise Minutes per Week 240   Barrier to exercise None         7/15/2025   Monitoring   Monitoring Assessed Today Yes   Did patient bring glucose meter to appointment?  No   Blood Glucose Meter ContourNext   Times checking blood sugar at home (number) 1   Times checking blood sugar at home (per) Day   Blood glucose trend No change           7/15/2025   Taking Medications   Taking Medication Assessed Today Yes   Current Treatments Diet         7/15/2025   Problem Solving   Problem Solving Assessed Today Yes   Is the patient at risk for hypoglycemia? No   Patient carries a carbohydrate source Not Needed   Is the patient at risk for DKA? No   Hypoglycemia None   Hypoglycemia Complications None           7/15/2025   Reducing Risks   Reducing Risks Assessed Today Yes   Diabetes Risks Age over 45 years;Hyperlipidemia;Family History   CAD Risks Diabetes Mellitus;Dyslipidemia;Obesity;Post-menopausal;Sedentary lifestyle   Has dilated eye exam at least once a year? Yes   Sees dentist every 6 months? Yes   Feet checked by healthcare provider in the last year? Yes         7/15/2025   Healthy Coping: Diabetes Distress Assessment   Healthy Coping Assessed Today Yes   I feel burned out by  all of the attention and effort that diabetes demands of me. 1 - Not a Problem   It bothers me that diabetes seems to control my life. 1 - Not a Problem   I am frustrated that even when I do what I am supposed to for my diabetes, it doesn't seem to make a difference. 3 - A Moderate Problem   No matter how hard I try with my diabetes, it feels like it will never be good enough. 4 - A Serious Problem   I am so tired of having to worry about diabetes all the time. 3 - A Moderate Problem   When it comes to my diabetes, I often feel like a failure. 4 - A Serious Problem   It depresses me when I realize that my diabetes will likely never go away. 1 - Not a Problem   Living with diabetes is overwhelming for me. 1 - Not a Problem   T2 DDAS Total Score (0 - 1.9 Little or no DD, 2.0 - 2.9 Moderate DD,  3.0+ High DD) 2.3   Informal Support system: Children;Family;Friends;Spouse         Gisela Dash RD, BRIANNE, [facility]   Certified Diabetes Care and   [facility] Tuesdays and Thursdays  [facility] Wednesdays-virtual visits only    Time Spent: 45 minutes  Encounter Type: Individual    Any diabetes medication dose changes were made via the [facility] Standing Orders under the patient's referring provider.    This note has been dictated using voice recognition software. Any grammatical or context distortions are unintentional and inherent to the software.

## 2025-08-10 ENCOUNTER — HEALTH MAINTENANCE LETTER (OUTPATIENT)
Age: 75
End: 2025-08-10